# Patient Record
Sex: FEMALE | Race: BLACK OR AFRICAN AMERICAN | NOT HISPANIC OR LATINO | Employment: OTHER | URBAN - METROPOLITAN AREA
[De-identification: names, ages, dates, MRNs, and addresses within clinical notes are randomized per-mention and may not be internally consistent; named-entity substitution may affect disease eponyms.]

---

## 2017-04-07 ENCOUNTER — COMMUNICATION - HEALTHEAST (OUTPATIENT)
Dept: FAMILY MEDICINE | Facility: CLINIC | Age: 52
End: 2017-04-07

## 2017-05-18 ENCOUNTER — COMMUNICATION - HEALTHEAST (OUTPATIENT)
Dept: FAMILY MEDICINE | Facility: CLINIC | Age: 52
End: 2017-05-18

## 2017-05-18 DIAGNOSIS — I10 HTN (HYPERTENSION): ICD-10-CM

## 2017-06-13 ENCOUNTER — COMMUNICATION - HEALTHEAST (OUTPATIENT)
Dept: FAMILY MEDICINE | Facility: CLINIC | Age: 52
End: 2017-06-13

## 2017-06-14 ENCOUNTER — OFFICE VISIT - HEALTHEAST (OUTPATIENT)
Dept: FAMILY MEDICINE | Facility: CLINIC | Age: 52
End: 2017-06-14

## 2017-06-14 ENCOUNTER — COMMUNICATION - HEALTHEAST (OUTPATIENT)
Dept: TELEHEALTH | Facility: CLINIC | Age: 52
End: 2017-06-14

## 2017-06-14 DIAGNOSIS — E78.00 HYPERCHOLESTEREMIA: ICD-10-CM

## 2017-06-14 DIAGNOSIS — I10 ESSENTIAL HYPERTENSION: ICD-10-CM

## 2017-06-14 DIAGNOSIS — E89.40 SURGICAL MENOPAUSE: ICD-10-CM

## 2017-06-14 DIAGNOSIS — Z01.818 PREOP EXAMINATION: ICD-10-CM

## 2017-06-14 LAB
CHOLEST SERPL-MCNC: 222 MG/DL
FASTING STATUS PATIENT QL REPORTED: YES
HDLC SERPL-MCNC: 74 MG/DL

## 2017-06-14 ASSESSMENT — MIFFLIN-ST. JEOR: SCORE: 1464.47

## 2017-06-15 LAB
ATRIAL RATE - MUSE: 58 BPM
DIASTOLIC BLOOD PRESSURE - MUSE: NORMAL MMHG
INTERPRETATION ECG - MUSE: NORMAL
P AXIS - MUSE: 42 DEGREES
PR INTERVAL - MUSE: 172 MS
QRS DURATION - MUSE: 80 MS
QT - MUSE: 478 MS
QTC - MUSE: 469 MS
R AXIS - MUSE: 9 DEGREES
SYSTOLIC BLOOD PRESSURE - MUSE: NORMAL MMHG
T AXIS - MUSE: -6 DEGREES
VENTRICULAR RATE- MUSE: 58 BPM

## 2017-07-11 ENCOUNTER — RECORDS - HEALTHEAST (OUTPATIENT)
Dept: ADMINISTRATIVE | Facility: OTHER | Age: 52
End: 2017-07-11

## 2017-07-12 ENCOUNTER — COMMUNICATION - HEALTHEAST (OUTPATIENT)
Dept: FAMILY MEDICINE | Facility: CLINIC | Age: 52
End: 2017-07-12

## 2017-07-13 ENCOUNTER — RECORDS - HEALTHEAST (OUTPATIENT)
Dept: ADMINISTRATIVE | Facility: OTHER | Age: 52
End: 2017-07-13

## 2017-07-13 ENCOUNTER — COMMUNICATION - HEALTHEAST (OUTPATIENT)
Dept: FAMILY MEDICINE | Facility: CLINIC | Age: 52
End: 2017-07-13

## 2017-07-13 ENCOUNTER — COMMUNICATION - HEALTHEAST (OUTPATIENT)
Dept: SCHEDULING | Facility: CLINIC | Age: 52
End: 2017-07-13

## 2017-07-21 ENCOUNTER — COMMUNICATION - HEALTHEAST (OUTPATIENT)
Dept: FAMILY MEDICINE | Facility: CLINIC | Age: 52
End: 2017-07-21

## 2017-07-31 ENCOUNTER — RECORDS - HEALTHEAST (OUTPATIENT)
Dept: ADMINISTRATIVE | Facility: OTHER | Age: 52
End: 2017-07-31

## 2017-08-07 ENCOUNTER — COMMUNICATION - HEALTHEAST (OUTPATIENT)
Dept: FAMILY MEDICINE | Facility: CLINIC | Age: 52
End: 2017-08-07

## 2017-08-08 ENCOUNTER — COMMUNICATION - HEALTHEAST (OUTPATIENT)
Dept: FAMILY MEDICINE | Facility: CLINIC | Age: 52
End: 2017-08-08

## 2017-08-09 ENCOUNTER — RECORDS - HEALTHEAST (OUTPATIENT)
Dept: ADMINISTRATIVE | Facility: OTHER | Age: 52
End: 2017-08-09

## 2017-09-26 ENCOUNTER — COMMUNICATION - HEALTHEAST (OUTPATIENT)
Dept: SCHEDULING | Facility: CLINIC | Age: 52
End: 2017-09-26

## 2017-09-27 ENCOUNTER — COMMUNICATION - HEALTHEAST (OUTPATIENT)
Dept: TELEHEALTH | Facility: CLINIC | Age: 52
End: 2017-09-27

## 2017-09-27 ENCOUNTER — OFFICE VISIT - HEALTHEAST (OUTPATIENT)
Dept: FAMILY MEDICINE | Facility: CLINIC | Age: 52
End: 2017-09-27

## 2017-09-27 DIAGNOSIS — N64.4 BREAST PAIN: ICD-10-CM

## 2017-09-27 DIAGNOSIS — I10 HYPERTENSION: ICD-10-CM

## 2017-09-27 DIAGNOSIS — R60.0 LOWER EXTREMITY EDEMA: ICD-10-CM

## 2017-09-27 ASSESSMENT — MIFFLIN-ST. JEOR: SCORE: 1502.61

## 2017-10-02 ENCOUNTER — COMMUNICATION - HEALTHEAST (OUTPATIENT)
Dept: FAMILY MEDICINE | Facility: CLINIC | Age: 52
End: 2017-10-02

## 2017-12-14 ENCOUNTER — COMMUNICATION - HEALTHEAST (OUTPATIENT)
Dept: FAMILY MEDICINE | Facility: CLINIC | Age: 52
End: 2017-12-14

## 2017-12-14 ENCOUNTER — OFFICE VISIT - HEALTHEAST (OUTPATIENT)
Dept: FAMILY MEDICINE | Facility: CLINIC | Age: 52
End: 2017-12-14

## 2017-12-14 DIAGNOSIS — Z12.31 VISIT FOR SCREENING MAMMOGRAM: ICD-10-CM

## 2017-12-14 DIAGNOSIS — I10 HTN (HYPERTENSION): ICD-10-CM

## 2017-12-14 DIAGNOSIS — R60.9 EDEMA: ICD-10-CM

## 2017-12-14 DIAGNOSIS — Z00.00 HEALTHCARE MAINTENANCE: ICD-10-CM

## 2017-12-14 DIAGNOSIS — E89.40 SURGICAL MENOPAUSE: ICD-10-CM

## 2017-12-14 DIAGNOSIS — M17.0 OSTEOARTHRITIS OF KNEES, BILATERAL: ICD-10-CM

## 2017-12-14 ASSESSMENT — MIFFLIN-ST. JEOR: SCORE: 1520.76

## 2017-12-14 NOTE — ASSESSMENT & PLAN NOTE
Discussed standard of care being to stop estrogen replacement at approximately 49 years of age.  Understands risks of estrogen including stroke and venous thromboembolism.  At this point, we aregoing to stop hydrochlorothiazide, I am worried about potential side effects with this.  We will see her back and then discuss stopping the estrogen at a later date

## 2017-12-14 NOTE — ASSESSMENT & PLAN NOTE
Encourage walking, order written for physical therapy for from current outside of [facility] provider to include knees.

## 2017-12-14 NOTE — ASSESSMENT & PLAN NOTE
No signs of cardiac problems, denies snoring, recent workup negative for secondary causes.  Will check BMP and UA today.  Add compression stockings.  Also, because of chronic hypokalemia, will stop hydrochlorothiazide.  Follow-up 4-6 weeks.  This may make edema worse while the stockings hopefully help

## 2017-12-18 ENCOUNTER — COMMUNICATION - HEALTHEAST (OUTPATIENT)
Dept: FAMILY MEDICINE | Facility: CLINIC | Age: 52
End: 2017-12-18

## 2017-12-18 DIAGNOSIS — I10 HTN (HYPERTENSION): ICD-10-CM

## 2018-01-05 ENCOUNTER — RECORDS - HEALTHEAST (OUTPATIENT)
Dept: MAMMOGRAPHY | Facility: CLINIC | Age: 53
End: 2018-01-05

## 2018-01-05 DIAGNOSIS — Z12.31 ENCOUNTER FOR SCREENING MAMMOGRAM FOR MALIGNANT NEOPLASM OF BREAST: ICD-10-CM

## 2018-01-05 LAB — ALT SERPL W P-5'-P-CCNC: 11 U/L (ref 0–45)

## 2018-01-08 ENCOUNTER — COMMUNICATION - HEALTHEAST (OUTPATIENT)
Dept: FAMILY MEDICINE | Facility: CLINIC | Age: 53
End: 2018-01-08

## 2018-01-09 ENCOUNTER — COMMUNICATION - HEALTHEAST (OUTPATIENT)
Dept: FAMILY MEDICINE | Facility: CLINIC | Age: 53
End: 2018-01-09

## 2018-01-11 ENCOUNTER — RECORDS - HEALTHEAST (OUTPATIENT)
Dept: ADMINISTRATIVE | Facility: OTHER | Age: 53
End: 2018-01-11

## 2018-05-15 ENCOUNTER — RECORDS - HEALTHEAST (OUTPATIENT)
Dept: ADMINISTRATIVE | Facility: OTHER | Age: 53
End: 2018-05-15

## 2018-05-24 ENCOUNTER — OFFICE VISIT - HEALTHEAST (OUTPATIENT)
Dept: FAMILY MEDICINE | Facility: CLINIC | Age: 53
End: 2018-05-24

## 2018-05-24 ENCOUNTER — AMBULATORY - HEALTHEAST (OUTPATIENT)
Dept: FAMILY MEDICINE | Facility: CLINIC | Age: 53
End: 2018-05-24

## 2018-05-24 DIAGNOSIS — Z01.818 PRE-OP EXAMINATION: ICD-10-CM

## 2018-05-24 LAB
ATRIAL RATE - MUSE: 57 BPM
DIASTOLIC BLOOD PRESSURE - MUSE: NORMAL MMHG
HGB BLD-MCNC: 11.5 G/DL (ref 12–16)
INTERPRETATION ECG - MUSE: NORMAL
P AXIS - MUSE: 150 DEGREES
POTASSIUM BLD-SCNC: 3.5 MMOL/L (ref 3.5–5)
PR INTERVAL - MUSE: 176 MS
QRS DURATION - MUSE: 80 MS
QT - MUSE: 482 MS
QTC - MUSE: 469 MS
R AXIS - MUSE: -25 DEGREES
SYSTOLIC BLOOD PRESSURE - MUSE: NORMAL MMHG
T AXIS - MUSE: -39 DEGREES
VENTRICULAR RATE- MUSE: 57 BPM

## 2018-05-24 ASSESSMENT — MIFFLIN-ST. JEOR: SCORE: 1535.5

## 2018-05-25 ENCOUNTER — RECORDS - HEALTHEAST (OUTPATIENT)
Dept: ADMINISTRATIVE | Facility: OTHER | Age: 53
End: 2018-05-25

## 2018-05-30 ENCOUNTER — RECORDS - HEALTHEAST (OUTPATIENT)
Dept: ADMINISTRATIVE | Facility: OTHER | Age: 53
End: 2018-05-30

## 2018-05-31 ENCOUNTER — OFFICE VISIT - HEALTHEAST (OUTPATIENT)
Dept: GERIATRICS | Facility: CLINIC | Age: 53
End: 2018-05-31

## 2018-05-31 DIAGNOSIS — R53.81 PHYSICAL DECONDITIONING: ICD-10-CM

## 2018-05-31 DIAGNOSIS — M48.02 SPINAL STENOSIS IN CERVICAL REGION: ICD-10-CM

## 2018-06-01 ENCOUNTER — AMBULATORY - HEALTHEAST (OUTPATIENT)
Dept: ADMINISTRATIVE | Facility: CLINIC | Age: 53
End: 2018-06-01

## 2018-06-01 ENCOUNTER — OFFICE VISIT - HEALTHEAST (OUTPATIENT)
Dept: GERIATRICS | Facility: CLINIC | Age: 53
End: 2018-06-01

## 2018-06-01 DIAGNOSIS — Z98.1 S/P CERVICAL SPINAL FUSION: ICD-10-CM

## 2018-06-01 DIAGNOSIS — G89.29 OTHER CHRONIC PAIN: ICD-10-CM

## 2018-06-01 DIAGNOSIS — R51.9 CHRONIC NONINTRACTABLE HEADACHE, UNSPECIFIED HEADACHE TYPE: ICD-10-CM

## 2018-06-01 DIAGNOSIS — G89.29 CHRONIC NONINTRACTABLE HEADACHE, UNSPECIFIED HEADACHE TYPE: ICD-10-CM

## 2018-06-01 DIAGNOSIS — M48.02 SPINAL STENOSIS IN CERVICAL REGION: ICD-10-CM

## 2018-06-01 DIAGNOSIS — I10 ESSENTIAL HYPERTENSION: ICD-10-CM

## 2018-06-05 ENCOUNTER — OFFICE VISIT - HEALTHEAST (OUTPATIENT)
Dept: GERIATRICS | Facility: CLINIC | Age: 53
End: 2018-06-05

## 2018-06-05 DIAGNOSIS — F32.9 MAJOR DEPRESSIVE DISORDER: ICD-10-CM

## 2018-06-08 ENCOUNTER — RECORDS - HEALTHEAST (OUTPATIENT)
Dept: LAB | Facility: CLINIC | Age: 53
End: 2018-06-08

## 2018-06-08 ENCOUNTER — OFFICE VISIT - HEALTHEAST (OUTPATIENT)
Dept: GERIATRICS | Facility: CLINIC | Age: 53
End: 2018-06-08

## 2018-06-08 DIAGNOSIS — G89.29 OTHER CHRONIC PAIN: ICD-10-CM

## 2018-06-08 DIAGNOSIS — M15.9 GENERALIZED OSTEOARTHROSIS, INVOLVING MULTIPLE SITES: ICD-10-CM

## 2018-06-08 DIAGNOSIS — Z98.1 S/P CERVICAL SPINAL FUSION: ICD-10-CM

## 2018-06-08 DIAGNOSIS — M48.02 SPINAL STENOSIS IN CERVICAL REGION: ICD-10-CM

## 2018-06-08 LAB
ALBUMIN UR-MCNC: NEGATIVE MG/DL
APPEARANCE UR: ABNORMAL
BACTERIA #/AREA URNS HPF: ABNORMAL HPF
BILIRUB UR QL STRIP: NEGATIVE
COLOR UR AUTO: YELLOW
GLUCOSE UR STRIP-MCNC: NEGATIVE MG/DL
HGB UR QL STRIP: ABNORMAL
KETONES UR STRIP-MCNC: NEGATIVE MG/DL
LEUKOCYTE ESTERASE UR QL STRIP: ABNORMAL
MUCOUS THREADS #/AREA URNS LPF: ABNORMAL LPF
NITRATE UR QL: NEGATIVE
PH UR STRIP: 5.5 [PH] (ref 4.5–8)
RBC #/AREA URNS AUTO: ABNORMAL HPF
SP GR UR STRIP: 1.01 (ref 1–1.03)
SQUAMOUS #/AREA URNS AUTO: ABNORMAL LPF
TRANS CELLS #/AREA URNS HPF: ABNORMAL LPF
UROBILINOGEN UR STRIP-ACNC: ABNORMAL
WBC #/AREA URNS AUTO: ABNORMAL HPF
WBC CLUMPS #/AREA URNS HPF: PRESENT /[HPF]

## 2018-06-09 LAB — BACTERIA SPEC CULT: NO GROWTH

## 2018-06-12 ENCOUNTER — HOME CARE/HOSPICE - HEALTHEAST (OUTPATIENT)
Dept: HOME HEALTH SERVICES | Facility: HOME HEALTH | Age: 53
End: 2018-06-12

## 2018-06-12 ENCOUNTER — OFFICE VISIT - HEALTHEAST (OUTPATIENT)
Dept: GERIATRICS | Facility: CLINIC | Age: 53
End: 2018-06-12

## 2018-06-12 DIAGNOSIS — R53.81 PHYSICAL DECONDITIONING: ICD-10-CM

## 2018-06-12 DIAGNOSIS — M54.12 BRACHIAL NEURITIS OR RADICULITIS: ICD-10-CM

## 2018-06-12 DIAGNOSIS — M48.02 SPINAL STENOSIS IN CERVICAL REGION: ICD-10-CM

## 2018-06-14 ENCOUNTER — COMMUNICATION - HEALTHEAST (OUTPATIENT)
Dept: GERIATRICS | Facility: CLINIC | Age: 53
End: 2018-06-14

## 2018-06-14 DIAGNOSIS — M48.02 CERVICAL STENOSIS OF SPINE: ICD-10-CM

## 2018-06-15 ENCOUNTER — COMMUNICATION - HEALTHEAST (OUTPATIENT)
Dept: GERIATRICS | Facility: CLINIC | Age: 53
End: 2018-06-15

## 2018-06-15 ENCOUNTER — AMBULATORY - HEALTHEAST (OUTPATIENT)
Dept: GERIATRICS | Facility: CLINIC | Age: 53
End: 2018-06-15

## 2018-06-18 ENCOUNTER — COMMUNICATION - HEALTHEAST (OUTPATIENT)
Dept: HOME HEALTH SERVICES | Facility: HOME HEALTH | Age: 53
End: 2018-06-18

## 2018-09-11 ENCOUNTER — RECORDS - HEALTHEAST (OUTPATIENT)
Dept: ADMINISTRATIVE | Facility: OTHER | Age: 53
End: 2018-09-11

## 2018-09-12 ENCOUNTER — RECORDS - HEALTHEAST (OUTPATIENT)
Dept: ADMINISTRATIVE | Facility: OTHER | Age: 53
End: 2018-09-12

## 2019-01-22 ENCOUNTER — RECORDS - HEALTHEAST (OUTPATIENT)
Dept: ADMINISTRATIVE | Facility: OTHER | Age: 54
End: 2019-01-22

## 2019-01-24 ENCOUNTER — COMMUNICATION - HEALTHEAST (OUTPATIENT)
Dept: FAMILY MEDICINE | Facility: CLINIC | Age: 54
End: 2019-01-24

## 2019-11-01 ENCOUNTER — COMMUNICATION - HEALTHEAST (OUTPATIENT)
Dept: FAMILY MEDICINE | Facility: CLINIC | Age: 54
End: 2019-11-01

## 2019-11-01 ENCOUNTER — AMBULATORY - HEALTHEAST (OUTPATIENT)
Dept: FAMILY MEDICINE | Facility: CLINIC | Age: 54
End: 2019-11-01

## 2019-11-04 ENCOUNTER — COMMUNICATION - HEALTHEAST (OUTPATIENT)
Dept: FAMILY MEDICINE | Facility: CLINIC | Age: 54
End: 2019-11-04

## 2019-12-21 ENCOUNTER — OFFICE VISIT (OUTPATIENT)
Dept: URGENT CARE | Facility: URGENT CARE | Age: 54
End: 2019-12-21
Payer: COMMERCIAL

## 2019-12-21 ENCOUNTER — ANCILLARY PROCEDURE (OUTPATIENT)
Dept: GENERAL RADIOLOGY | Facility: CLINIC | Age: 54
End: 2019-12-21
Attending: FAMILY MEDICINE
Payer: COMMERCIAL

## 2019-12-21 VITALS
DIASTOLIC BLOOD PRESSURE: 78 MMHG | OXYGEN SATURATION: 98 % | HEART RATE: 72 BPM | RESPIRATION RATE: 18 BRPM | TEMPERATURE: 98.1 F | SYSTOLIC BLOOD PRESSURE: 132 MMHG | WEIGHT: 236.4 LBS

## 2019-12-21 DIAGNOSIS — S49.91XA SHOULDER INJURY, RIGHT, INITIAL ENCOUNTER: Primary | ICD-10-CM

## 2019-12-21 PROCEDURE — 99203 OFFICE O/P NEW LOW 30 MIN: CPT | Performed by: FAMILY MEDICINE

## 2019-12-21 PROCEDURE — 73030 X-RAY EXAM OF SHOULDER: CPT | Mod: RT

## 2019-12-21 RX ORDER — GABAPENTIN 300 MG/1
CAPSULE ORAL
COMMUNITY
Start: 2019-04-14 | End: 2021-09-03

## 2019-12-21 RX ORDER — METHOCARBAMOL 750 MG/1
750 TABLET, FILM COATED ORAL
COMMUNITY
Start: 2018-05-30 | End: 2021-09-03

## 2019-12-21 RX ORDER — DULOXETIN HYDROCHLORIDE 60 MG/1
CAPSULE, DELAYED RELEASE ORAL
COMMUNITY
Start: 2019-10-10 | End: 2021-09-03

## 2019-12-21 RX ORDER — OXYCODONE HYDROCHLORIDE 10 MG/1
10 TABLET ORAL EVERY 4 HOURS PRN
COMMUNITY
Start: 2019-12-02 | End: 2024-01-31

## 2019-12-21 RX ORDER — LISINOPRIL 20 MG/1
TABLET ORAL
COMMUNITY
Start: 2019-07-06 | End: 2021-09-03

## 2019-12-21 RX ORDER — HYDROXYZINE PAMOATE 50 MG/1
CAPSULE ORAL
COMMUNITY
Start: 2019-03-30 | End: 2021-09-03

## 2019-12-21 RX ORDER — NAPROXEN 500 MG/1
500 TABLET ORAL 2 TIMES DAILY WITH MEALS
Qty: 14 TABLET | Refills: 0 | Status: SHIPPED | OUTPATIENT
Start: 2019-12-21 | End: 2019-12-28

## 2019-12-21 RX ORDER — NORGESTIMATE AND ETHINYL ESTRADIOL 7DAYSX3 28
1 KIT ORAL
COMMUNITY
End: 2021-09-03

## 2019-12-21 RX ORDER — PREGABALIN 200 MG/1
CAPSULE ORAL
COMMUNITY
Start: 2019-10-08

## 2019-12-21 RX ORDER — ASPIRIN 325 MG
325 TABLET ORAL
COMMUNITY
End: 2023-09-19

## 2019-12-21 RX ORDER — HYDROCHLOROTHIAZIDE 25 MG/1
TABLET ORAL
COMMUNITY
Start: 2019-11-28 | End: 2021-09-03

## 2019-12-21 RX ORDER — AMLODIPINE BESYLATE 10 MG/1
TABLET ORAL
COMMUNITY
Start: 2019-11-28 | End: 2021-09-03

## 2019-12-21 NOTE — PROGRESS NOTES
SUBJECTIVE:  Chief Complaint   Patient presents with     Arm Injury     Right arm s0onodst when fell on ice about 9 days ago and is still causeing a lot of pain   .ident presents with a chief complaint of right shoulder.  The injury occurred 9 days ago.   The injury happened while while walking.   How: trauma: fall immediate pain  The patient complained of moderate pain and has had decreased ROM.    Pain exacerbated by movement    He treated it initially with no therapy.   This is the first time this type of injury has occurred to this patient.     No past medical history on file.  Allergies   Allergen Reactions     Hydrocodone-Acetaminophen GI Disturbance and Nausea     Ibuprofen Nausea     Social History     Tobacco Use     Smoking status: Never Smoker     Smokeless tobacco: Never Used   Substance Use Topics     Alcohol use: Not on file       ROSINTEGUMENTARY/SKIN: NEGATIVE for open wound/bleeding and NEGATIVE for bruising    EXAM: /78   Pulse 72   Temp 98.1  F (36.7  C) (Tympanic)   Resp 18   Wt 107.2 kg (236 lb 6.4 oz)   SpO2 98% Gen: healthy,alert,no distress  Extremity: shoulder has pain with palpation and rom.   There is not compromise to the distal circulation.  Pulses are +2 and CRT is brisk.GENERAL APPEARANCE: healthy, alert and no distress  EXTREMITIES: peripheral pulses normal  SKIN: no suspicious lesions or rashes  NEURO: Normal strength and tone, sensory exam grossly normal, mentation intact and speech normal    Xray without acute findings, no fx read by Onofre Meek D.O.      ICD-10-CM    1. Shoulder injury, right, initial encounter S49.91XA XR Shoulder Right G/E 3 Views     naproxen (NAPROSYN) 500 MG tablet     ORTHOPEDICS ADULT REFERRAL     [facility]

## 2020-01-30 ENCOUNTER — OFFICE VISIT - HEALTHEAST (OUTPATIENT)
Dept: FAMILY MEDICINE | Facility: CLINIC | Age: 55
End: 2020-01-30

## 2020-01-30 DIAGNOSIS — M25.561 CHRONIC PATELLOFEMORAL PAIN OF BOTH KNEES: ICD-10-CM

## 2020-01-30 DIAGNOSIS — G89.29 CHRONIC PATELLOFEMORAL PAIN OF BOTH KNEES: ICD-10-CM

## 2020-01-30 DIAGNOSIS — F33.9 EPISODE OF RECURRENT MAJOR DEPRESSIVE DISORDER, UNSPECIFIED DEPRESSION EPISODE SEVERITY (H): ICD-10-CM

## 2020-01-30 DIAGNOSIS — M25.562 CHRONIC PATELLOFEMORAL PAIN OF BOTH KNEES: ICD-10-CM

## 2020-01-30 DIAGNOSIS — Z00.00 HEALTHCARE MAINTENANCE: ICD-10-CM

## 2020-01-30 DIAGNOSIS — Z12.11 COLON CANCER SCREENING: ICD-10-CM

## 2020-01-30 ASSESSMENT — MIFFLIN-ST. JEOR: SCORE: 1586.52

## 2020-01-30 NOTE — ASSESSMENT & PLAN NOTE
Longstanding history of depression, doing somewhat worse lately, details of this not discussed due to time constraint.  Restart Cymbalta which she has been off of for some time, low-dose, 30 mg.    I know that she has had some hallucinations in the past.  Discussed amber, no history of this.    Denies suicidal ideation.  Follow-up 1 month for further discussion/physical.

## 2020-01-30 NOTE — ASSESSMENT & PLAN NOTE
"Severe, right leg.  Already having physical therapy after fall on her right shoulder.  Will ask them to assess and treat this, through [facility]    Discussed pathophysiology, discussed contribution of weight, recommend analgesics which she is already taking.  Patient is followed by pain clinic, is on oxycodone and OxyContin.    Also, I think that it is likely that she has a Baker's cyst in her right knee.    "

## 2020-01-30 NOTE — ASSESSMENT & PLAN NOTE
Discussed colonoscopy, patient agreeable.  Follow-up in the near future for physical exam.  Did have a hysterectomy.

## 2020-12-01 ENCOUNTER — OFFICE VISIT - HEALTHEAST (OUTPATIENT)
Dept: FAMILY MEDICINE | Facility: CLINIC | Age: 55
End: 2020-12-01

## 2020-12-01 ENCOUNTER — COMMUNICATION - HEALTHEAST (OUTPATIENT)
Dept: FAMILY MEDICINE | Facility: CLINIC | Age: 55
End: 2020-12-01

## 2020-12-01 DIAGNOSIS — E66.01 MORBID OBESITY (H): ICD-10-CM

## 2020-12-01 DIAGNOSIS — I10 ESSENTIAL HYPERTENSION: ICD-10-CM

## 2020-12-01 DIAGNOSIS — E78.00 HYPERCHOLESTEREMIA: ICD-10-CM

## 2020-12-01 DIAGNOSIS — E66.813 CLASS 3 SEVERE OBESITY WITH SERIOUS COMORBIDITY AND BODY MASS INDEX (BMI) OF 40.0 TO 44.9 IN ADULT, UNSPECIFIED OBESITY TYPE (H): ICD-10-CM

## 2020-12-01 DIAGNOSIS — E66.01 CLASS 3 SEVERE OBESITY WITH SERIOUS COMORBIDITY AND BODY MASS INDEX (BMI) OF 40.0 TO 44.9 IN ADULT, UNSPECIFIED OBESITY TYPE (H): ICD-10-CM

## 2020-12-01 DIAGNOSIS — M25.562 ARTHRALGIA OF LEFT LOWER LEG: ICD-10-CM

## 2020-12-01 DIAGNOSIS — Z00.00 HEALTHCARE MAINTENANCE: ICD-10-CM

## 2020-12-01 DIAGNOSIS — D64.9 ANEMIA, UNSPECIFIED TYPE: ICD-10-CM

## 2020-12-01 DIAGNOSIS — F33.9 EPISODE OF RECURRENT MAJOR DEPRESSIVE DISORDER, UNSPECIFIED DEPRESSION EPISODE SEVERITY (H): ICD-10-CM

## 2020-12-01 DIAGNOSIS — E87.6 HYPOPOTASSEMIA: ICD-10-CM

## 2020-12-01 LAB
ALBUMIN SERPL-MCNC: 4.2 G/DL (ref 3.5–5)
ALP SERPL-CCNC: 91 U/L (ref 45–120)
ALT SERPL W P-5'-P-CCNC: 14 U/L (ref 0–45)
ANION GAP SERPL CALCULATED.3IONS-SCNC: 11 MMOL/L (ref 5–18)
AST SERPL W P-5'-P-CCNC: 20 U/L (ref 0–40)
BILIRUB SERPL-MCNC: 0.2 MG/DL (ref 0–1)
BUN SERPL-MCNC: 14 MG/DL (ref 8–22)
CALCIUM SERPL-MCNC: 9.1 MG/DL (ref 8.5–10.5)
CHLORIDE BLD-SCNC: 102 MMOL/L (ref 98–107)
CHOLEST SERPL-MCNC: 200 MG/DL
CO2 SERPL-SCNC: 28 MMOL/L (ref 22–31)
CREAT SERPL-MCNC: 0.97 MG/DL (ref 0.6–1.1)
FASTING STATUS PATIENT QL REPORTED: NO
GFR SERPL CREATININE-BSD FRML MDRD: 59 ML/MIN/1.73M2
GLUCOSE BLD-MCNC: 79 MG/DL (ref 70–125)
HDLC SERPL-MCNC: 63 MG/DL
HGB BLD-MCNC: 10.4 G/DL (ref 12–16)
POTASSIUM BLD-SCNC: 3.8 MMOL/L (ref 3.5–5)
PROT SERPL-MCNC: 7.5 G/DL (ref 6–8)
SODIUM SERPL-SCNC: 141 MMOL/L (ref 136–145)

## 2020-12-01 ASSESSMENT — PATIENT HEALTH QUESTIONNAIRE - PHQ9: SUM OF ALL RESPONSES TO PHQ QUESTIONS 1-9: 6

## 2020-12-01 ASSESSMENT — MIFFLIN-ST. JEOR: SCORE: 1581.99

## 2020-12-01 NOTE — ASSESSMENT & PLAN NOTE
Frustrated, no weight loss on an intermittent fasting diet and exercising regularly.  Discussed referral to bariatric clinic.  Agreeable.

## 2020-12-01 NOTE — ASSESSMENT & PLAN NOTE
Not focus of discussion today, reports doing fairly wellconsidering Covid.  PHQ 9= 6    5 to 9: mild   10 to 14: moderate   15 to 19: moderately severe   ?20: severe     Taking duloxetine 30  No changes

## 2020-12-01 NOTE — ASSESSMENT & PLAN NOTE
Pressure mysteriously excellent today.  Not on any medication for some time since she ran out.  Will hold off for now.

## 2020-12-01 NOTE — ASSESSMENT & PLAN NOTE
With osteoarthritis of the left knee.  Refer for physical therapy, refer for bariatric clinic, Celebrex was effective for her and nowthat blood pressure is not an interfering issue and normal renal function, will prescribe that for pain.  Patient also on opiate contract through pain clinic.

## 2020-12-02 LAB
FERRITIN SERPL-MCNC: 119 NG/ML (ref 10–130)
HCV AB SERPL QL IA: NEGATIVE

## 2021-01-15 ENCOUNTER — COMMUNICATION - HEALTHEAST (OUTPATIENT)
Dept: SURGERY | Facility: CLINIC | Age: 56
End: 2021-01-15

## 2021-01-18 ENCOUNTER — COMMUNICATION - HEALTHEAST (OUTPATIENT)
Dept: FAMILY MEDICINE | Facility: CLINIC | Age: 56
End: 2021-01-18

## 2021-01-18 DIAGNOSIS — I10 ESSENTIAL HYPERTENSION: ICD-10-CM

## 2021-02-28 ENCOUNTER — COMMUNICATION - HEALTHEAST (OUTPATIENT)
Dept: FAMILY MEDICINE | Facility: CLINIC | Age: 56
End: 2021-02-28

## 2021-02-28 ENCOUNTER — AMBULATORY - HEALTHEAST (OUTPATIENT)
Dept: FAMILY MEDICINE | Facility: CLINIC | Age: 56
End: 2021-02-28

## 2021-02-28 DIAGNOSIS — M25.562 ARTHRALGIA OF LEFT LOWER LEG: ICD-10-CM

## 2021-03-16 ENCOUNTER — COMMUNICATION - HEALTHEAST (OUTPATIENT)
Dept: FAMILY MEDICINE | Facility: CLINIC | Age: 56
End: 2021-03-16

## 2021-03-16 DIAGNOSIS — I10 ESSENTIAL HYPERTENSION: ICD-10-CM

## 2021-03-22 ENCOUNTER — RECORDS - HEALTHEAST (OUTPATIENT)
Dept: ADMINISTRATIVE | Facility: OTHER | Age: 56
End: 2021-03-22

## 2021-03-22 ENCOUNTER — COMMUNICATION - HEALTHEAST (OUTPATIENT)
Dept: FAMILY MEDICINE | Facility: CLINIC | Age: 56
End: 2021-03-22

## 2021-03-25 ENCOUNTER — COMMUNICATION - HEALTHEAST (OUTPATIENT)
Dept: SCHEDULING | Facility: CLINIC | Age: 56
End: 2021-03-25

## 2021-04-15 ENCOUNTER — COMMUNICATION - HEALTHEAST (OUTPATIENT)
Dept: FAMILY MEDICINE | Facility: CLINIC | Age: 56
End: 2021-04-15

## 2021-04-15 DIAGNOSIS — M25.562 ARTHRALGIA OF LEFT LOWER LEG: ICD-10-CM

## 2021-05-26 ENCOUNTER — RECORDS - HEALTHEAST (OUTPATIENT)
Dept: ADMINISTRATIVE | Facility: CLINIC | Age: 56
End: 2021-05-26

## 2021-05-26 ASSESSMENT — PATIENT HEALTH QUESTIONNAIRE - PHQ9: SUM OF ALL RESPONSES TO PHQ QUESTIONS 1-9: 6

## 2021-05-27 ENCOUNTER — RECORDS - HEALTHEAST (OUTPATIENT)
Dept: ADMINISTRATIVE | Facility: CLINIC | Age: 56
End: 2021-05-27

## 2021-05-28 ENCOUNTER — RECORDS - HEALTHEAST (OUTPATIENT)
Dept: ADMINISTRATIVE | Facility: CLINIC | Age: 56
End: 2021-05-28

## 2021-05-29 ENCOUNTER — RECORDS - HEALTHEAST (OUTPATIENT)
Dept: ADMINISTRATIVE | Facility: CLINIC | Age: 56
End: 2021-05-29

## 2021-05-31 VITALS — HEIGHT: 63 IN | BODY MASS INDEX: 36.86 KG/M2 | WEIGHT: 208 LBS

## 2021-05-31 VITALS — BODY MASS INDEX: 37.56 KG/M2 | HEIGHT: 63 IN | WEIGHT: 212 LBS

## 2021-05-31 VITALS — HEIGHT: 63 IN | BODY MASS INDEX: 35.61 KG/M2 | WEIGHT: 201 LBS

## 2021-06-01 VITALS — WEIGHT: 217 LBS | HEIGHT: 63 IN | BODY MASS INDEX: 38.45 KG/M2

## 2021-06-01 VITALS — WEIGHT: 206.8 LBS | BODY MASS INDEX: 37.22 KG/M2

## 2021-06-01 VITALS — BODY MASS INDEX: 38.59 KG/M2 | WEIGHT: 214.4 LBS

## 2021-06-01 VITALS — WEIGHT: 214.4 LBS | BODY MASS INDEX: 38.59 KG/M2

## 2021-06-01 VITALS — WEIGHT: 207.4 LBS | BODY MASS INDEX: 37.33 KG/M2

## 2021-06-02 NOTE — TELEPHONE ENCOUNTER
FYI - Status Update  Who is Calling: Patient  Update: Patient stated that she would like this to be faxed to the fax number [phone]. Patient is questioning if this can be done today as she will be moving in to her apartment today.  Okay to leave a detailed message?:  No

## 2021-06-02 NOTE — TELEPHONE ENCOUNTER
Who is requesting the letter?  Patient  Why is the letter needed? Needs letter for emotional support animal (has a dog) to be faxed to her new apartment building she is moving to today.    How would you like this letter returned? Patient states she will call back with the fax number    Okay to leave a detailed message? Yes

## 2021-06-03 NOTE — TELEPHONE ENCOUNTER
Name of form/paperwork: Forms from [facility] for patients emotional support animal.  Have you been seen for this request: yes  Do we have the form: Will be faxed to clinic today.  When is form needed by: asap  How would you like the form returned: faxed  Fax Number: [phone]  Patient Notified form requests are processed in 3-5 business days: No  (If patient needs form sooner, please note that in this message.)  Okay to leave a detailed message? Yes, call patient when forms are received.

## 2021-06-03 NOTE — TELEPHONE ENCOUNTER
Patient Returning Call  Reason for call:  Patient called back.  Information relayed to patient: Informed that the letter was faxed. Patient states they never got the letter.  Patient is requesting the letter to be re faxed to the [facility] kostas Spear at [phone], which is the same number.  Patient requesting a call back when letter is faxed again.  Patient has additional questions:  No  If YES, what are your questions/concerns:  As above.  Okay to leave a detailed message?: Yes

## 2021-06-04 ENCOUNTER — RECORDS - HEALTHEAST (OUTPATIENT)
Dept: ADMINISTRATIVE | Facility: CLINIC | Age: 56
End: 2021-06-04

## 2021-06-04 VITALS
RESPIRATION RATE: 18 BRPM | DIASTOLIC BLOOD PRESSURE: 78 MMHG | HEIGHT: 62 IN | BODY MASS INDEX: 42.33 KG/M2 | WEIGHT: 230 LBS | SYSTOLIC BLOOD PRESSURE: 104 MMHG | HEART RATE: 72 BPM

## 2021-06-05 VITALS
TEMPERATURE: 97.6 F | DIASTOLIC BLOOD PRESSURE: 66 MMHG | RESPIRATION RATE: 20 BRPM | BODY MASS INDEX: 42.14 KG/M2 | HEART RATE: 64 BPM | HEIGHT: 62 IN | WEIGHT: 229 LBS | SYSTOLIC BLOOD PRESSURE: 100 MMHG

## 2021-06-05 NOTE — PROGRESS NOTES
"Assessment/ Plan  Problem List Items Addressed This Visit        High    Major Depression, Recurrent - Primary     Longstanding history of depression, doing somewhat worse lately, details of this not discussed due to time constraint.  Restart Cymbalta which she has been off of for some time, low-dose, 30 mg.    I know that she has had some hallucinations in the past.  Discussed amber, no history of this.    Denies suicidal ideation.  Follow-up 1 month for further discussion/physical.           Relevant Medications    DULoxetine (CYMBALTA) 30 MG capsule       Unprioritized    Healthcare maintenance     Discussed colonoscopy, patient agreeable.  Follow-up in the near future for physical exam.  Did have a hysterectomy.         Chronic patellofemoral pain of both knees     Severe, right leg.  Already having physical therapy after fall on her right shoulder.  Will ask them to assess and treat this, through [facility]    Discussed pathophysiology, discussed contribution of weight, recommend analgesics which she is already taking.  Patient is followed by pain clinic, is on oxycodone and OxyContin.    Also, I think that it is likely that she has a Baker's cyst in her right knee.             Other Visit Diagnoses     Colon cancer screening        Relevant Orders    Ambulatory referral for Colonoscopy        Subjective  CC:  Chief Complaint   Patient presents with     Knee Pain     Arm Pain     HPI:  Gradual onset of bilateral knee pain, worse in the right than it is the left.  Patient without recent injury.  Not sure if the knee is swollen.  Pain anterior, severe, particularly when she is trying to get up from sitting or laying down, hurts to go downstairs.  PFSH:  Patient Active Problem List   Diagnosis     Hypokalemia     Hematuria     Urge Incontinence Of Urine     Chronic Constipation     Cervical Spine Stenosis     Generalized anxiety disorder     Patellofemoral Syndrome Of The Left Knee     " Hypercholesteremia     Major Depression, Recurrent     Hypertension     Generalized Osteoarthritis     Cervical Disc Degeneration     Lumbar Radiculopathy     Bladder wall thickening     Hydronephrosis, right     Constipation     Hypomagnesemia     Brachial neuritis or radiculitis NOS     Health care maintenance     Chronic pain     Osteoarthritis of knees, bilateral     Edema     Surgical menopause     Healthcare maintenance     Physical deconditioning     Chronic patellofemoral pain of both knees     Current medications reviewed as follows:  Current Outpatient Medications on File Prior to Visit   Medication Sig     amLODIPine (NORVASC) 10 MG tablet Take 10 mg by mouth daily.     aspirin 325 MG tablet Take 325 mg by mouth daily.     aspirin-acetaminophen-caffeine (EXCEDRIN MIGRAINE) 250-250-65 mg per tablet Take 2 tablets by mouth 2 (two) times a day as needed for pain.     gabapentin (NEURONTIN) 300 MG capsule Take 600 mg by mouth 2 (two) times a day.     hydroCHLOROthiazide (HYDRODIURIL) 25 MG tablet Take 25 mg by mouth daily.     lisinopril (PRINIVIL,ZESTRIL) 2.5 MG tablet Take 2.5 mg by mouth daily.     oxyCODONE (ROXICODONE) 10 mg immediate release tablet Take 1-2 tablets (10-20 mg total) by mouth every 4 (four) hours as needed for pain (10mg for pain 4-6, 20mg for pain 7-10).     acetaminophen (TYLENOL) 325 MG tablet Take 325-650 mg by mouth every 6 (six) hours as needed for pain.     gabapentin (NEURONTIN) 300 MG capsule Take 900 mg by mouth at bedtime.     methocarbamol (ROBAXIN) 750 MG tablet Take 750 mg by mouth 3 (three) times a day.     nitroglycerin (NITROSTAT) 0.4 MG SL tablet Place 0.4 mg under the tongue every 5 (five) minutes as needed for chest pain.     polyethylene glycol (MIRALAX) 17 gram packet Take 17 g by mouth daily.     senna-docusate (SENNOSIDES-DOCUSATE SODIUM) 8.6-50 mg tablet Take 1-4 tablets by mouth 2 (two) times a day.     No current facility-administered medications on file prior  "to visit.      Social History     Tobacco Use   Smoking Status Current Some Day Smoker   Smokeless Tobacco Never Used     Social History     Social History Narrative     Not on file     Patient Care Team:  Jaime Keane MD as PCP - General  Jaime Keane MD as Assigned PCP  [facility]  Full 10 system review including constitutional, respiratory, cardiac, gi, urinary, rheumatologic, neurologic, reproductive, dermatologic psychiatric is  performed  and is otherwise negative         Objective  Physical Exam  Vitals:    01/30/20 1105   BP: 104/78   Patient Site: Right Arm   Patient Position: Sitting   Cuff Size: Adult Large   Pulse: 72   Resp: 18   Weight: (!) 230 lb (104.3 kg)   Height: 5' 2\" (1.575 m)     Body mass index is 42.07 kg/m .  Right knee is normal on inspection.  There is no sign of effusion and overlying skin is not erythematous.  Palpation of the knee shows  Moderate tenderness of the patella and positive patellar compression test.  Negative tenderness of the medial joint line.  Negative tenderness of the lateral joint line.  There is no tenderness with stress of the MCL or LCL.  Lockman's test is negative.  Dominga's test is negative        Diagnostics:   none      Please note: Voice recognition software was used in this dictation.  It may therefore contain typographical errors.      "

## 2021-06-11 NOTE — PROGRESS NOTES
[facility] PRE-OPERATIVE VISIT FOR:    Sridevi Centeno,  1965, MRN [id number]    Upcoming surgery date: 17  Surgeon: Meir  Surgery Facility: [facility]    Chief Complaint: Preop    PCP: Jaime Keane MD, [phone]    SUBJECTIVE:  History of Present Illness:   Sridevi Centeno is a 51 y.o. female with history of chronic low back pain.  On long-term opiate medications, still with severe pain.  Plan for spinal cord stimulator as adjunct treatment recommended by pain physician  Past Medical History:  Patient Active Problem List   Diagnosis     Hypokalemia     Hematuria     Urge Incontinence Of Urine     Chronic Constipation     Cervical Spine Stenosis     Generalized anxiety disorder     Patellofemoral Syndrome Of The Left Knee     Hypercholesteremia     Major Depression, Recurrent     Hypertension     Generalized Osteoarthritis     Cervical Disc Degeneration     Lumbar Radiculopathy     Bladder wall thickening     Hydronephrosis, right     Constipation     Hypomagnesemia     Brachial neuritis or radiculitis NOS     Health care maintenance     Chronic pain     Past Surgical History:   Procedure Laterality Date     HYSTERECTOMY       OOPHORECTOMY       NE APPENDECTOMY      Description: Appendectomy;  Recorded: 2011;     NE ARTHRODESIS ANT INTERBODY MIN DISCECTOMY, CERVICAL BELOW C2      Description: Cervical Vertebral Fusion;  Recorded: 2011;     NE TOTAL ABDOM HYSTERECTOMY      Description: Total Abdominal Hysterectomy;  Proc Date: 1993;     History of bleeding: No    History of tobacco use: No    History of anesthesia reactions: No    Social History:  she  reports that she has never smoked. She has never used smokeless tobacco. She reports that she drinks alcohol. She reports that she does not use illicit drugs.  Single  Family History:  Family History   Problem Relation Age of Onset     No Medical Problems Mother      No Medical Problems Father      No  Medical Problems Sister      No Medical Problems Daughter      No Medical Problems Maternal Grandmother      No Medical Problems Maternal Grandfather      No Medical Problems Paternal Grandmother      No Medical Problems Paternal Grandfather      No Medical Problems Maternal Aunt      No Medical Problems Paternal Aunt      BRCA 1/2 Neg Hx      Breast cancer Neg Hx      Cancer Neg Hx      Colon cancer Neg Hx      Endometrial cancer Neg Hx      Ovarian cancer Neg Hx        Current Medications:  Current Outpatient Prescriptions   Medication Sig Dispense Refill     amLODIPine (NORVASC) 2.5 MG tablet TAKE 1 TABLET BY MOUTH DAILY. 30 tablet 0     ASPIRIN/ACETAMINOPHEN/CAFFEINE (EXCEDRIN MIGRAINE ORAL)        bismuth subsalicylate (KAOPECTATE EX STR, BISMUTH SS,) 525 mg/15 mL Susp Take 15 mL by mouth every hour as needed (MAX dose 8.). 240 mL 1     gabapentin (NEURONTIN) 300 MG capsule Take 600 mg by mouth 3 (three) times a day.       hydroCHLOROthiazide (HYDRODIURIL) 25 MG tablet TAKE 1 TABLET (25 MG TOTAL) BY MOUTH DAILY. 30 tablet 8     lisinopril (PRINIVIL,ZESTRIL) 20 MG tablet TAKE 1 TABLET BY MOUTH DAILY. 30 tablet 0     morphine (MS CONTIN) 30 MG 12 hr tablet Take 1 tablet by mouth 3 (three) times a day.  0     oxyCODONE-acetaminophen (PERCOCET) 7.5-325 mg per tablet Take 1 tablet by mouth every 6 (six) hours as needed for pain. 90 tablet 0     polyethylene glycol (MIRALAX) 17 gram packet Take 1 packet (17 g total) by mouth daily. MIX 17 GRAMS IN 8 OUNCES OF WATER AND DRINK ONCE DAILY 14 each 6     No current facility-administered medications for this visit.      Facility-Administered Medications Ordered in Other Visits   Medication Dose Route Frequency Provider Last Rate Last Dose     iohexol 350 mg iodine/mL injection 100 mL (OMNIPAQUE)  100 mL Intravenous Once in imaging Mitchell Villa MD           Allergies:  Hydrocodone-acetaminophen and Ibuprofen    Review or Systems:  Constitution: normal  HEENT:  normal  Pulmonary: normal  Cardiovascular: normal  GI: normal  : normal  Musculoskeletal: normal  Neuro: normal  Endocrine: normal  Psych: normal  Lymph/Heme: normal    OBJECTIVE:  Vitals:    06/14/17 1004   BP: 116/88   Pulse: 68   Resp: 20   Temp: 97.6  F (36.4  C)   SpO2: 98%     General Appearance:    Alert, cooperative, no distress, appears stated age   Head:    Normocephalic, without obvious abnormality, atraumatic   Eyes:    PERRL, conjunctiva/corneas clear, EOM's intact   Nose:   Mucosa moist, normal    Throat:   Lips, mucosa, and tongue normal; ora phaynx clear   Neck:   Supple, symmetrical, trachea midline, no adenopathy;     thyroid:  no enlargement/tenderness/nodules; no carotid    bruit or JVD   Lungs:     Clear to auscultation bilaterally, respirations unlabored    Heart:    Regular rate and rhythm, S1 and S2 normal, no murmur, rub   or gallop   Abdomen:     Soft, non-tender, bowel sounds active all four quadrants,     no masses, no organomegaly   Extremities:   Extremities normal, atraumatic, no cyanosis or edema       Skin:   Skin color, texture, turgor normal, no rashes or lesions   Neurologic:   No focal deficits         Labs:  Electrocardiogram personally reviewed  Borderline prolonged QT interval  Nonspecific ST changes with T-wave flattening in lateral and inferior leads, inverted T waves V3 through V6.  Abnormal EKG    ASSESSMENT/PLAN:  1. Preop examination  Hemoglobin    Potassium    Electrocardiogram Perform - Clinic   2. Hypertension     3. Surgical menopause     4. Hypercholesteremia  HDL Cholesterol    Cholesterol, Total     Abnormal EKG which is new.    Patient has good exercise tolerance, low to intermediate risk surgery, okay for procedure.    Morphine alone on morning of surgery.    Await potassium and hemoglobin results.  We will forward these when available    Also, will stop Sprintec which he previously was on, start Estrace for maintenance of hot flashes, surgical  menopause.  Jaime Keane MD
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
## 2021-06-14 NOTE — PROGRESS NOTES
Assessment/ Plan  Problem List Items Addressed This Visit        Unprioritized    Osteoarthritis of knees, bilateral - Primary     Encourage walking, order written for physical therapy for from current outside of [facility] provider to include knees.         Edema     No signs of cardiac problems, denies snoring, recent workup negative for secondary causes.  Will check BMP and UA today.  Add compression stockings.  Also, because of chronic hypokalemia, will stop hydrochlorothiazide.  Follow-up 4-6 weeks.  This may make edema worse while the stockings hopefully help         Relevant Orders    Basic Metabolic Panel    ALT (SGPT)    Urinalysis-UC if Indicated    Surgical menopause     Discussed standard of care being to stop estrogen replacement at approximately 49 years of age.  Understands risks of estrogen including stroke and venous thromboembolism.  At this point, we are going to stop hydrochlorothiazide, I am worried about potential side effects with this.  We will see her back and then discuss stopping the estrogen at a later date         Healthcare maintenance     Recommend mammogram which she is going to set up.           Other Visit Diagnoses     HTN (hypertension)        Relevant Medications    lisinopril (PRINIVIL,ZESTRIL) 20 MG tablet    Other Relevant Orders    Basic Metabolic Panel    ALT (SGPT)    Urinalysis-UC if Indicated    Visit for screening mammogram        Relevant Orders    Mammo Screening Bilateral        Subjective  CC:  Chief Complaint   Patient presents with     Knee Pain     both side     HPI:  bilat Knee Pain  Narrative: Previous history of knee pain, has been the lesser of her problems recently with significant cervical spine issue  Notes:  Duration/ timing of onset:3-4 weeks ago, gradual onset  Location of pain entire knee    Severity/ Quality: stiff knee  Modifying factors: Make it worse- standing and stairs   Make it better- no  History of knee problems/injury or previous work-up/  treatment? no  Clicking or popping? yes  Swelling? Whole leg has been swollen.  Comments Knee x-rays 1/8/14 showed mild medial compartment narrowing bilaterally  She was seen on that day with acute arthritis and swelling of the left knee.      Patient with lower extremity edema that is worsened recently.  She is under the belief that the knee problem and the leg problem are the same thing.  Discussed that I think this is somewhat unlikely.  Has had lower extremity edema worked up in the past.  Was seen in the emergency room 8/25/17 with this and also, 9/27/17.  See my discussion from that time.  She was on both amlodipine and gabapentin.  Had her stop the amlodipine, continue the gabapentin.  This was stopped for a while and just recently restarted.  Thus, she was off of gabapentin and amlodipine when her leg swelling started to get worse today.    Denies PND, orthopnea, shortness of breath, chest pains.    Currently undergoing back and neck therapy as well as pelvic floor therapy    Hysterectomy 1993.  Still, at 52, is taking estrogen only HRT  PFSH:  Patient Active Problem List   Diagnosis     Hypokalemia     Hematuria     Urge Incontinence Of Urine     Chronic Constipation     Cervical Spine Stenosis     Generalized anxiety disorder     Patellofemoral Syndrome Of The Left Knee     Hypercholesteremia     Major Depression, Recurrent     Hypertension     Generalized Osteoarthritis     Cervical Disc Degeneration     Lumbar Radiculopathy     Bladder wall thickening     Hydronephrosis, right     Constipation     Hypomagnesemia     Brachial neuritis or radiculitis NOS     Health care maintenance     Chronic pain     Osteoarthritis of knees, bilateral     Edema     Surgical menopause     Healthcare maintenance     Current medications reviewed as follows:  Current Outpatient Prescriptions on File Prior to Visit   Medication Sig     ASPIRIN/ACETAMINOPHEN/CAFFEINE (EXCEDRIN MIGRAINE ORAL)      bismuth subsalicylate  "(KAOPECTATE EX STR, BISMUTH SS,) 525 mg/15 mL Susp Take 15 mL by mouth every hour as needed (MAX dose 8.).     gabapentin (NEURONTIN) 300 MG capsule Take 600 mg by mouth 3 (three) times a day.     hydroCHLOROthiazide (HYDRODIURIL) 25 MG tablet TAKE 1 TABLET (25 MG TOTAL) BY MOUTH DAILY.     morphine (MS CONTIN) 30 MG 12 hr tablet Take 1 tablet by mouth 3 (three) times a day.     oxyCODONE-acetaminophen (PERCOCET) 7.5-325 mg per tablet Take 1 tablet by mouth every 6 (six) hours as needed for pain.     polyethylene glycol (MIRALAX) 17 gram packet Take 1 packet (17 g total) by mouth daily. MIX 17 GRAMS IN 8 OUNCES OF WATER AND DRINK ONCE DAILY     [DISCONTINUED] estradiol (ESTRACE) 2 MG tablet Take 1 tablet (2 mg total) by mouth daily.     [DISCONTINUED] lisinopril (PRINIVIL,ZESTRIL) 20 MG tablet TAKE 1 TABLET BY MOUTH DAILY.     No current facility-administered medications on file prior to visit.      History   Smoking Status     Never Smoker   Smokeless Tobacco     Never Used     Social History     Social History Narrative     Patient Care Team:  Jaime Keane MD as PCP - General  ROS  Full 10 system review including constitutional, respiratory, cardiac, gi, urinary, rheumatologic, neurologic, reproductive, dermatologic psychiatric is  performed (via questionnaire) and is negative       Objective  Physical Exam  Vitals:    12/14/17 1132   BP: 124/76   Pulse: 65   Resp: 20   SpO2: 99%   Weight: 212 lb (96.2 kg)   Height: 5' 3\" (1.6 m)     Body mass index is 37.55 kg/(m^2).  Gen- alert, oriented/ appropriately responsive  HEENT- normal cephalic, atraumatic.   Chest- Normal inspiration and expiration.  Clear to ascultation.  No chest wall deformity or scar.  CV- Heart regular rate and rhythm, normal tones, no murmurs gallops or rubs.  Ext- appear well perfused, 1+ lower extremity edema  Left knee is normal on inspection.  There is no sign of effusion and overlying skin is not erythematous.  Palpation of the knee " shows  No tenderness of the patella and negative patellar compression test.  Moderate tenderness of the medial joint line.  Negative tenderness of the lateral joint line.  There is no tenderness with stress of the MCL or LCL.  Lockman's test is negative.  Dominga's test is negative    Repeated on the right knee and is similar    Skin- warm and dry, no visualized rash    Diagnostics:         Please note: Voice recognition software was used in this dictation.  It may therefore contain typographical errors.

## 2021-06-14 NOTE — TELEPHONE ENCOUNTER
Attempts made to contact patient in regards to appointment scheduled for today with this writer at 10 am.  Patient did not link onto video visit nor answer the phone with attempts made, therefore left patient a detailed message including call center contact information to call and reschedule this appointment at her earliest convenience.

## 2021-06-15 NOTE — TELEPHONE ENCOUNTER
RN cannot approve Refill Request    RN can NOT refill this medication med is not covered by policy/route to provider. Last office visit: 12/1/2020 Jaime Keane MD Last Physical: 5/24/2018 Last MTM visit: Visit date not found Last visit same specialty: 12/1/2020 Jaime Keane MD.  Next visit within 3 mo: Visit date not found  Next physical within 3 mo: Visit date not found      Anais Diamond, [facility] Triage/Med Refill 2/28/2021    Requested Prescriptions   Pending Prescriptions Disp Refills     celecoxib (CELEBREX) 100 MG capsule [Pharmacy Med Name: Celecoxib Oral Capsule 100 MG] 60 capsule 0     Sig: TAKE ONE CAPSULE BY MOUTH TWICE DAILY       There is no refill protocol information for this order

## 2021-06-16 PROBLEM — M17.0 OSTEOARTHRITIS OF KNEES, BILATERAL: Status: ACTIVE | Noted: 2017-12-14

## 2021-06-16 PROBLEM — M25.562 CHRONIC PATELLOFEMORAL PAIN OF BOTH KNEES: Status: ACTIVE | Noted: 2020-01-30

## 2021-06-16 PROBLEM — R60.9 EDEMA: Status: ACTIVE | Noted: 2017-12-14

## 2021-06-16 PROBLEM — G89.29 CHRONIC PATELLOFEMORAL PAIN OF BOTH KNEES: Status: ACTIVE | Noted: 2020-01-30

## 2021-06-16 PROBLEM — Z00.00 HEALTHCARE MAINTENANCE: Status: ACTIVE | Noted: 2017-12-14

## 2021-06-16 PROBLEM — M25.561 CHRONIC PATELLOFEMORAL PAIN OF BOTH KNEES: Status: ACTIVE | Noted: 2020-01-30

## 2021-06-16 PROBLEM — R26.89 IMPAIRMENT OF BALANCE: Status: ACTIVE | Noted: 2017-02-28

## 2021-06-16 PROBLEM — R53.81 PHYSICAL DECONDITIONING: Status: ACTIVE | Noted: 2018-05-31

## 2021-06-16 PROBLEM — E89.40 SURGICAL MENOPAUSE: Status: ACTIVE | Noted: 2017-12-14

## 2021-06-16 NOTE — TELEPHONE ENCOUNTER
hydroCHLOROthiazide (HYDRODIURIL) 25 MG tablet [[id number]]    Electronically signed by: Teresita Estrada MD on 01/18/21 2050 Status: Discontinued   Ordering user: Teresita Estrada MD 01/18/21 2050 Authorized by: Teresita Estrada MD   Frequency: DAILY 01/18/21 - 02/28/21  Released by: Teresita Estrada MD 01/18/21 2050   Discontinued by: Jaime Keane MD 02/28/21 0905   Diagnoses   Essential hypertension [I10]

## 2021-06-16 NOTE — TELEPHONE ENCOUNTER
DOD: Please see message below. Ok to write an order for the undergarments or is this ok to wait for  tomorrow? Please advise. Thanks.

## 2021-06-16 NOTE — TELEPHONE ENCOUNTER
Telephone Encounter by Rachele Juarez LPN at 11/1/2019  1:41 PM     Author: Rachele Juarez LPN Service: -- Author Type: Licensed Nurse    Filed: 11/1/2019  1:44 PM Encounter Date: 11/1/2019 Status: Signed    : Rachele Juarez LPN (Licensed Nurse)       Patient Returning Call  Reason for call:  Patient call  Information relayed to patient:  Jaime Keane MD   to Chavo Gamino MA          11/1/19 1:02 PM   Note      Okay, letter printed.      Patient has additional questions:  Yes  If YES, what are your questions/concerns:  Patient requested to fax letter to [facility]  Fax #  [phone].  Okay to leave a detailed message?: No

## 2021-06-16 NOTE — TELEPHONE ENCOUNTER
Reason for Call:  Other prescription     Detailed comments: pt is calling requesting ref to [facility] for her undergarments( not diapers), she said we faxed a ref already but it was dated 2019. [facility] said they need a new rx.     Phone Number Patient can be reached at: Home number on file [phone] (home)    Best Time: any    Can we leave a detailed message on this number?: Yes    Call taken on 3/22/2021 at 10:01 AM by Nallely Wheat

## 2021-06-16 NOTE — TELEPHONE ENCOUNTER
In order for ping to cover these he needs to put specific reason why she needs them and the  amount needed  Sometimes uses 12 times a day she needs to double up on pullups    Loses control of bladder due to pressure she needs to have  back surgery shes holding off until after the Covid epidemic

## 2021-06-16 NOTE — TELEPHONE ENCOUNTER
Called and left detailed message letting pt know we faxed order to [facility] at fax number [phone]

## 2021-06-16 NOTE — TELEPHONE ENCOUNTER
RN Triage:    Received call from [facility]ga (confirmed by caller ID) requesting patient phone number so they can contact her about the order for incontinence supplies that we sent over this morning.    Tania Prieto RN  [facility] Nurse Advisor

## 2021-06-16 NOTE — TELEPHONE ENCOUNTER
Refill Approved    Rx renewed per Medication Renewal Policy. Medication was last renewed on 1/18/21.    Onofre Medeiros, [facility] Triage/Med Refill 3/17/2021     Requested Prescriptions   Pending Prescriptions Disp Refills     hydroCHLOROthiazide (HYDRODIURIL) 25 MG tablet [Pharmacy Med Name: hydroCHLOROthiazide Oral Tablet 25 MG] 30 tablet 0     Sig: Take 1 tablet (25 mg total) by mouth daily.       Diuretics/Combination Diuretics Refill Protocol  Passed - 3/16/2021 11:30 AM        Passed - Visit with PCP or prescribing provider visit in past 12 months     Last office visit with prescriber/PCP: Visit date not found OR same dept: 12/1/2020 Jaime Keane MD OR same specialty: 12/1/2020 Jaime Keane MD  Last physical: Visit date not found Last MTM visit: Visit date not found   Next visit within 3 mo: Visit date not found  Next physical within 3 mo: Visit date not found  Prescriber OR PCP: Teresita Estrada MD  Last diagnosis associated with med order: 1. Hypertension  - hydroCHLOROthiazide (HYDRODIURIL) 25 MG tablet [Pharmacy Med Name: hydroCHLOROthiazide Oral Tablet 25 MG]; Take 1 tablet (25 mg total) by mouth daily.  Dispense: 30 tablet; Refill: 0  - amLODIPine (NORVASC) 10 MG tablet [Pharmacy Med Name: amLODIPine Besylate Oral Tablet 10 MG]; Take 1 tablet (10 mg total) by mouth daily.  Dispense: 30 tablet; Refill: 0  - lisinopriL (PRINIVIL,ZESTRIL) 20 MG tablet [Pharmacy Med Name: Lisinopril Oral Tablet 20 MG]; Take 1 tablet (20 mg total) by mouth daily.  Dispense: 30 tablet; Refill: 0    If protocol passes may refill for 12 months if within 3 months of last provider visit (or a total of 15 months).             Passed - Serum Potassium in past 12 months      Lab Results   Component Value Date    Potassium 3.8 12/01/2020             Passed - Serum Sodium in past 12 months      Lab Results   Component Value Date    Sodium 141 12/01/2020             Passed - Blood pressure on file in  past 12 months     BP Readings from Last 1 Encounters:   12/01/20 100/66             Passed - Serum Creatinine in past 12 months      Creatinine   Date Value Ref Range Status   12/01/2020 0.97 0.60 - 1.10 mg/dL Final                amLODIPine (NORVASC) 10 MG tablet [Pharmacy Med Name: amLODIPine Besylate Oral Tablet 10 MG] 30 tablet 0     Sig: Take 1 tablet (10 mg total) by mouth daily.       Calcium-Channel Blockers Protocol Passed - 3/16/2021 11:30 AM        Passed - PCP or prescribing provider visit in past 12 months or next 3 months     Last office visit with prescriber/PCP: Visit date not found OR same dept: 12/1/2020 Jaime eKane MD OR same specialty: 12/1/2020 Jaime Keane MD  Last physical: Visit date not found Last MTM visit: Visit date not found   Next visit within 3 mo: Visit date not found  Next physical within 3 mo: Visit date not found  Prescriber OR PCP: Teresita Estrada MD  Last diagnosis associated with med order: 1. Hypertension  - hydroCHLOROthiazide (HYDRODIURIL) 25 MG tablet [Pharmacy Med Name: hydroCHLOROthiazide Oral Tablet 25 MG]; Take 1 tablet (25 mg total) by mouth daily.  Dispense: 30 tablet; Refill: 0  - amLODIPine (NORVASC) 10 MG tablet [Pharmacy Med Name: amLODIPine Besylate Oral Tablet 10 MG]; Take 1 tablet (10 mg total) by mouth daily.  Dispense: 30 tablet; Refill: 0  - lisinopriL (PRINIVIL,ZESTRIL) 20 MG tablet [Pharmacy Med Name: Lisinopril Oral Tablet 20 MG]; Take 1 tablet (20 mg total) by mouth daily.  Dispense: 30 tablet; Refill: 0    If protocol passes may refill for 12 months if within 3 months of last provider visit (or a total of 15 months).             Passed - Blood pressure filed in past 12 months     BP Readings from Last 1 Encounters:   12/01/20 100/66                lisinopriL (PRINIVIL,ZESTRIL) 20 MG tablet [Pharmacy Med Name: Lisinopril Oral Tablet 20 MG] 30 tablet 0     Sig: Take 1 tablet (20 mg total) by mouth daily.       Ace  Inhibitors Refill Protocol Passed - 3/16/2021 11:30 AM        Passed - PCP or prescribing provider visit in past 12 months       Last office visit with prescriber/PCP: Visit date not found OR same dept: 12/1/2020 Jaime Kaene MD OR same specialty: 12/1/2020 Jaime Keane MD  Last physical: Visit date not found Last MTM visit: Visit date not found   Next visit within 3 mo: Visit date not found  Next physical within 3 mo: Visit date not found  Prescriber OR PCP: Teresita Estrada MD  Last diagnosis associated with med order: 1. Hypertension  - hydroCHLOROthiazide (HYDRODIURIL) 25 MG tablet [Pharmacy Med Name: hydroCHLOROthiazide Oral Tablet 25 MG]; Take 1 tablet (25 mg total) by mouth daily.  Dispense: 30 tablet; Refill: 0  - amLODIPine (NORVASC) 10 MG tablet [Pharmacy Med Name: amLODIPine Besylate Oral Tablet 10 MG]; Take 1 tablet (10 mg total) by mouth daily.  Dispense: 30 tablet; Refill: 0  - lisinopriL (PRINIVIL,ZESTRIL) 20 MG tablet [Pharmacy Med Name: Lisinopril Oral Tablet 20 MG]; Take 1 tablet (20 mg total) by mouth daily.  Dispense: 30 tablet; Refill: 0    If protocol passes may refill for 12 months if within 3 months of last provider visit (or a total of 15 months).             Passed - Serum Potassium in past 12 months     Lab Results   Component Value Date    Potassium 3.8 12/01/2020             Passed - Blood pressure filed in past 12 months     BP Readings from Last 1 Encounters:   12/01/20 100/66             Passed - Serum Creatinine in past 12 months     Creatinine   Date Value Ref Range Status   12/01/2020 0.97 0.60 - 1.10 mg/dL Final

## 2021-06-16 NOTE — TELEPHONE ENCOUNTER
Patient called back, she needs a size large. Please fax over referral to [facility]. Please call patient when referral has been sent.

## 2021-06-16 NOTE — TELEPHONE ENCOUNTER
"Attempted to call pt. Kept getting a busy dial tone, will try again later What size does pt need?   \" Okay to relay message\"    "

## 2021-06-16 NOTE — TELEPHONE ENCOUNTER
RN cannot approve Refill Request    RN can NOT refill this medication med is not covered by policy/route to provider. Last office visit: 12/1/2020 Jaime Keane MD Last Physical: 5/24/2018 Last MTM visit: Visit date not found Last visit same specialty: 12/1/2020 Jaime Keane MD.  Next visit within 3 mo: Visit date not found  Next physical within 3 mo: Visit date not found      Emma Lizarraga, [facility] Triage/Med Refill 4/15/2021    Requested Prescriptions   Pending Prescriptions Disp Refills     celecoxib (CELEBREX) 100 MG capsule [Pharmacy Med Name: Celecoxib Oral Capsule 100 MG] 60 capsule 0     Sig: TAKE ONE CAPSULE BY MOUTH TWICE DAILY       There is no refill protocol information for this order

## 2021-06-16 NOTE — TELEPHONE ENCOUNTER
Please clarify- is this for the DIEGO protective underwear she has had from [employer] in the past?  If so- what size.  Also if sometimes 12, is 10/ day= 360 per month appropriate?

## 2021-06-16 NOTE — TELEPHONE ENCOUNTER
Pt called yes it is the DIEGO and the amount would be appropriate  for her she would like this called into [address] [phone]     They will cover her insurance whereas Sarah or Dexter leblanc cover     Thank you

## 2021-06-18 NOTE — PROGRESS NOTES
[facility]      Facility:    [facility] [[id number]]  Code Status: FULL CODE      Chief Complaint/Reason for Visit:  Chief Complaint   Patient presents with     H & P       HPI:   Sridevi is a 52 y.o. female who underwent cervical spinal fusion regarding cervical spinal stenosis. She has chronic pain in regards to this and follows with the pain clinic.  She also has hypertension.  When I asked her about Prinzmetal's angina, she was unaware of any heart problems.  She does have listed in her electronic medical record anxiety and depression and lumbar radiculopathy as well as chronic constipation and urge incontinence of urine and has had past surgical procedures of abdominal hysterectomy, and appendectomy.      Upon current review of systems she is having particularly problems with headache.  At home she uses 2 pills of Excedrin Migraine twice daily and this controls symptoms.  She is not having problems with fevers or chills, cough or shortness of breath or chest pain or abdominal pain or nausea.    Past Medical History:  Past Medical History:   Diagnosis Date     Anxiety disorder      Arthritis      Bladder wall thickening      Brachial neuritis or radiculitis      Cervical spinal stenosis      Chronic pain      Constipation      Degenerative cervical disc      Edema      Generalized anxiety disorder      Hematuria      Hydronephrosis     right     Hypercholesteremia      Hypercholesteremia      Hypertension      Hypertension      Hypokalemia      Hypomagnesemia      Lumbar radiculopathy      Major depression      Osteoarthritis      Patellofemoral syndrome     left knee     Prinzmetal's angina      Surgical menopause      Urge incontinence of urine            Surgical History:  Past Surgical History:   Procedure Laterality Date     ANTERIOR FUSION CERVICAL SPINE  05/2018    C2 TO C 5 WITH CORPECTOMY C3-4, POSTERIOR C2-C6     HYSTERECTOMY  1993     OOPHORECTOMY Bilateral 1993      ORIF WRIST FRACTURE Right 1998     WI APPENDECTOMY      Description: Appendectomy;  Recorded: 11/01/2011;     WI ARTHRODESIS ANT INTERBODY MIN DISCECTOMY, CERVICAL BELOW C2  1992    Description: Cervical Vertebral Fusion;  Recorded: 11/01/2011;     WI TOTAL ABDOM HYSTERECTOMY      Description: Total Abdominal Hysterectomy;  Proc Date: 01/01/1993;       Family History:   Family History   Problem Relation Age of Onset     No Medical Problems Mother      No Medical Problems Father      No Medical Problems Sister      No Medical Problems Daughter      No Medical Problems Maternal Grandmother      No Medical Problems Maternal Grandfather      No Medical Problems Paternal Grandmother      No Medical Problems Paternal Grandfather      No Medical Problems Maternal Aunt      No Medical Problems Paternal Aunt      BRCA 1/2 Neg Hx      Breast cancer Neg Hx      Cancer Neg Hx      Colon cancer Neg Hx      Endometrial cancer Neg Hx      Ovarian cancer Neg Hx        Social History:    Social History     Social History     Marital status: Single     Spouse name: N/A     Number of children: N/A     Years of education: N/A     Social History Main Topics     Smoking status: Never Smoker     Smokeless tobacco: Never Used     Alcohol use Yes     Drug use: No     Sexual activity: Not on file     Other Topics Concern     Not on file     Social History Narrative          Review of Systems   All other systems reviewed and are negative.      Vitals:    06/01/18 1720   BP: 106/68   Pulse: 60   Resp: 20   Temp: 97.5  F (36.4  C)   SpO2: 98%       Physical Exam   Constitutional: No distress.   She easily drifts to sleep   HENT:   Mouth/Throat: Oropharynx is clear and moist.   Eyes: Right eye exhibits no discharge. Left eye exhibits no discharge.   Neck:   Cervical collar in place   Cardiovascular: Normal rate, regular rhythm and normal heart sounds.    Pulmonary/Chest: Effort normal and breath sounds normal. No respiratory distress.    Abdominal: Soft. Bowel sounds are normal. She exhibits no distension. There is no tenderness.   Musculoskeletal: She exhibits no edema or tenderness.   Neurological: She is alert.   Skin: Skin is warm and dry.   Psychiatric: She has a normal mood and affect.   Nursing note and vitals reviewed.      Medication List:  Current Outpatient Prescriptions   Medication Sig     aspirin-acetaminophen-caffeine (EXCEDRIN MIGRAINE) 250-250-65 mg per tablet Take 2 tablets by mouth 2 (two) times a day as needed for pain.     oxyCODONE (ROXICODONE) 10 mg immediate release tablet Take 20 mg by mouth every 4 (four) hours as needed for pain.     acetaminophen (TYLENOL) 325 MG tablet Take 325-650 mg by mouth every 6 (six) hours as needed for pain.     amLODIPine (NORVASC) 10 MG tablet Take 10 mg by mouth daily.     aspirin 325 MG tablet Take 325 mg by mouth daily.     gabapentin (NEURONTIN) 300 MG capsule Take 600 mg by mouth 2 (two) times a day.     gabapentin (NEURONTIN) 300 MG capsule Take 900 mg by mouth at bedtime.     hydroCHLOROthiazide (HYDRODIURIL) 25 MG tablet Take 25 mg by mouth daily.     lisinopril (PRINIVIL,ZESTRIL) 2.5 MG tablet Take 2.5 mg by mouth daily.     methocarbamol (ROBAXIN) 750 MG tablet Take 750 mg by mouth 3 (three) times a day.     nitroglycerin (NITROSTAT) 0.4 MG SL tablet Place 0.4 mg under the tongue every 5 (five) minutes as needed for chest pain.     oxyCODONE (ROXICODONE) 10 mg immediate release tablet Take 10 mg by mouth every 4 (four) hours as needed for pain.     polyethylene glycol (MIRALAX) 17 gram packet Take 17 g by mouth daily.     senna-docusate (SENNOSIDES-DOCUSATE SODIUM) 8.6-50 mg tablet Take 1-4 tablets by mouth 2 (two) times a day.       Labs:  No new laboratory testing    Assessment:    ICD-10-CM    1. Cervical Spine Stenosis M48.02    2. Other chronic pain G89.29    3. Chronic nonintractable headache, unspecified headache type R51    4. S/P cervical spinal fusion Z98.1    5.  Hypertension I10        Plan:  Excedrin Migraine was ordered as 2 pills twice daily as needed for headache.  Total Tylenol is not to exceed 4000 mg per day.  The Tylenol as needed was changed to 500 mg 1 pill every 4 hours as needed pain.  Since the Excedrin Migraine has 250 mg of acetaminophen per tablet, it would be possible to combine the 2 Excedrin Migraine pills with 1 of the extra strength Tylenol and still not exceed maximum dosing per single dose of Tylenol.  In regards to the constipation problems, senna S was written as 2 pills twice daily scheduled and MiraLAX 17 g daily scheduled.  Pads for the collar were to be ordered and change as needed      Electronically signed by: Ray Yoder MD

## 2021-06-18 NOTE — PROGRESS NOTES
[facility] Patient Outreach:     Discharge Date::  6/14/18      Reason for TCU stay (discharge diagnosis)::  Anterior fusion with corpectomy C3-4 and posterior fusion C2-6      Are you feeling better, the same or worse since your discharge?:  Patient is feeling the same          As part of your discharge plan, did they discuss home care with you?: Yes        Have your seen them yet, or are they scheduled to visit?: No        Did you receive any new medications, or was there a change to your medications?: No            Do you have any follow up visits scheduled with your PCP or Specialist?:  Yes, with PCP      (RN) Is it scheduled soon enough (3-5 days)?: Yes (Patient was unaware of PCP appt for 6/20/18.  She'll have to confirm with her PCA that they can take her, otherwise she'll call to re-schedule.  )        I'm glad to hear you're doing well and we want you to continue to do well. Your PCP would like to see you for a follow-up visit. Can we help set that up for your today?: No        (RN) Provided patient the PCP's phone number to call if they have any questions or concerns?: Yes

## 2021-06-18 NOTE — PROGRESS NOTES
[facility]    Facility:   [facility] [[id number]]   Code Status: FULL CODE      CHIEF COMPLAINT/REASON FOR VISIT:  Chief Complaint   Patient presents with     Problem Visit     SI, Depression       HISTORY:      HPI: Sridevi is a 52 y.o. female presenting to [facility] TCU for rehabilitation s/p having a surgical repair of cervical spinal stenosis. She was admitted to [facility] on 5/25/2018 and underwent an anterior fusion with corpectomy C3-4 and posterior fusion of C2-C6. She was discharged to [facility] on 5/30/2018. She has a complex past medical history outlined below in the PMH table. Her hospitalization has been summarized by the discharging physician:     Hospitalization summarized in previous notes.     Today she is being evaluated for depression with suicidal ideation. Patient states that she is very depressed and has been for years. She has never been treated for it. It is something that has gone hand in hand with her chronic pain issues. She has had suicidal ideation before but never has had a plan. She does not have a plan now and no means to go through with anything. She states she would never do it. She states being black or  it is a big stigma to have mental health issues and she has not had support from her family. They have told her to just be tough and get over it. She and I have a lengthy discussion about chemical imbalance and stress. We also discuss the fact that depression is a disease just like DMII or HTN. This allows her to open up and she is willing to divulge more. She has talked about laying in bed for hours at home and not moving wishing she is dead. She is alive because she couldn't do that to her family, especially her nephews who she is close with. She is happy to work with a psychotherapist or psychiatrist. She is open to trying an antidepressant. She has a history of anxiety and has gone to the ER with panic  attacks before where she thought she was going to have a heart attack. She denies any other concerns including fevers/chills, cough or cold symptoms, headaches, vision changes, chest pain/pressure, difficulty breathing, SOB, abdominal pain, nausea, vomiting, diarrhea, dysuria, increasing weakness, increasing pain. Nursing staff have no other concerns for her at this time. PT/OT have been going well.     Past Medical History:   Diagnosis Date     Anxiety disorder      Arthritis      Bladder wall thickening      Brachial neuritis or radiculitis      Cervical spinal stenosis      Chronic pain      Constipation      Degenerative cervical disc      Edema      Generalized anxiety disorder      Hematuria      Hydronephrosis     right     Hypercholesteremia      Hypercholesteremia      Hypertension      Hypertension      Hypokalemia      Hypomagnesemia      Lumbar radiculopathy      Major depression      Osteoarthritis      Patellofemoral syndrome     left knee     Prinzmetal's angina      Surgical menopause      Urge incontinence of urine              Family History   Problem Relation Age of Onset     No Medical Problems Mother      No Medical Problems Father      No Medical Problems Sister      No Medical Problems Daughter      No Medical Problems Maternal Grandmother      No Medical Problems Maternal Grandfather      No Medical Problems Paternal Grandmother      No Medical Problems Paternal Grandfather      No Medical Problems Maternal Aunt      No Medical Problems Paternal Aunt      BRCA 1/2 Neg Hx      Breast cancer Neg Hx      Cancer Neg Hx      Colon cancer Neg Hx      Endometrial cancer Neg Hx      Ovarian cancer Neg Hx      Social History     Social History     Marital status: Single     Spouse name: N/A     Number of children: N/A     Years of education: N/A     Social History Main Topics     Smoking status: Never Smoker     Smokeless tobacco: Never Used     Alcohol use Yes     Drug use: No     Sexual activity:  Not on file     Other Topics Concern     Not on file     Social History Narrative       REVIEW OF SYSTEM:  Pertinent items are noted in HPI.    PHYSICAL EXAM:   /68  Pulse 66  Temp 97.6  F (36.4  C)  Resp 18  Wt 214 lb 6.4 oz (97.3 kg)  LMP 01/05/1993  SpO2 92%  BMI 38.59 kg/m2  General appearance: alert, appears stated age and cooperative  Head: Normocephalic, without obvious abnormality, atraumatic  Neck: no adenopathy, no carotid bruit, no JVD, supple, symmetrical, trachea midline and thyroid not enlarged, symmetric, no tenderness/mass/nodules, incisions bandaged posteriorly and anteriorly and are clean/dry/intact  Lungs: clear to auscultation bilaterally  Heart: regular rate and rhythm, S1, S2 normal, no murmur, click, rub or gallop  Abdomen: soft, non-tender; bowel sounds normal; no masses,  no organomegaly  Extremities: extremities normal, atraumatic, no cyanosis or edema  Pulses: 2+ and symmetric  Skin: Skin color, texture, turgor normal. No rashes or lesions  Neurologic: Grossly normal      LABS:   None today.     ASSESSMENT:      ICD-10-CM    1. Major depressive disorder F32.9        PLAN:    Depression   -PHQ9.   -Psych consult.   -Duloxetine 30 mg by mouth daily for one week, then increase to 60 mg by mouth daily.   -Close monitoring- discussed risks of depression with suicidal ideation when starting a new antidepressant.   -Follow up carefully.     Physical Deconditioning  -Continue PT/OT and other therapies as per care plan.  -Encouraged good nutrition and movement habits.   -Discussed care plan and expected course of stay.   -Continue to follow-up per routine schedule or sooner if needed.     Otherwise continue current care plan for all other chronic medical conditions, as they are stable. Encouraged patient to engage in healthy lifestyle behaviors such as engaging in social activities, exercising (PT/OT), eating well, and following care plan. Follow up for routine check-up, or sooner if  needed. Will continue to monitor patient and work with nursing staff collaboratively to work toward positive patient outcomes.    Greater than 35 minutes spent with patient with at least 55% of this time spent on review of previous records, counseling, education, and discussion of the above care plan with nursing staff, and patient.     Electronically signed by: Celia Aburto CNP

## 2021-06-18 NOTE — PROGRESS NOTES
[facility]    Facility:   [facility] [[id number]]   Code Status: FULL CODE      CHIEF COMPLAINT/REASON FOR VISIT:  Chief Complaint   Patient presents with     Review Of Multiple Medical Conditions     physical deconditioning, cervical stenosis, brachial neuritis       HISTORY:      HPI: Sridevi is a 52 y.o. female presenting to [facility]CU for rehabilitation s/p having a surgical repair of cervical spinal stenosis. She was admitted to [facility] on 5/25/2018 and underwent an anterior fusion with corpectomy C3-4 and posterior fusion of C2-C6. She was discharged to [facility] on 5/30/2018. She has a complex past medical history outlined below in the PMH table. Her hospitalization has been summarized by the discharging physician:     Hospitalization summarized in previous notes.     Today she is being evaluated for routine review of medical problems while in the TCU. She states things have been going relatively well since our last visit. She has been positive and upbeat. She feels things are looking up. She does not have any acute medical issues she would like to discuss today. She did have some urine issues including cloudiness over the weekend and an UA/UC were ordered and negative. She has no further symptoms or concerns. She denies any other concerns including fevers/chills, cough or cold symptoms, headaches, vision changes, chest pain/pressure, difficulty breathing, SOB, abdominal pain, nausea, vomiting, diarrhea, dysuria, increasing weakness, increasing pain. Nursing staff have no other concerns for her at this time. PT/OT have been going well.     Past Medical History:   Diagnosis Date     Anxiety disorder      Arthritis      Bladder wall thickening      Brachial neuritis or radiculitis      Cervical spinal stenosis      Chronic pain      Constipation      Degenerative cervical disc      Edema      Generalized anxiety disorder      Hematuria       Hydronephrosis     right     Hypercholesteremia      Hypercholesteremia      Hypertension      Hypertension      Hypokalemia      Hypomagnesemia      Lumbar radiculopathy      Major depression      Osteoarthritis      Patellofemoral syndrome     left knee     Prinzmetal's angina      Surgical menopause      Urge incontinence of urine              Family History   Problem Relation Age of Onset     No Medical Problems Mother      No Medical Problems Father      No Medical Problems Sister      No Medical Problems Daughter      No Medical Problems Maternal Grandmother      No Medical Problems Maternal Grandfather      No Medical Problems Paternal Grandmother      No Medical Problems Paternal Grandfather      No Medical Problems Maternal Aunt      No Medical Problems Paternal Aunt      BRCA 1/2 Neg Hx      Breast cancer Neg Hx      Cancer Neg Hx      Colon cancer Neg Hx      Endometrial cancer Neg Hx      Ovarian cancer Neg Hx      Social History     Social History     Marital status: Single     Spouse name: N/A     Number of children: N/A     Years of education: N/A     Social History Main Topics     Smoking status: Never Smoker     Smokeless tobacco: Never Used     Alcohol use Yes     Drug use: No     Sexual activity: Not on file     Other Topics Concern     Not on file     Social History Narrative       REVIEW OF SYSTEM:  Pertinent items are noted in HPI.    PHYSICAL EXAM:   /76  Pulse 61  Temp 97.7  F (36.5  C)  Resp 18  Wt 207 lb 6.4 oz (94.1 kg)  LMP 01/05/1993  SpO2 98%  BMI 37.33 kg/m2  General appearance: alert, appears stated age and cooperative  Head: Normocephalic, without obvious abnormality, atraumatic  Neck: no adenopathy, no carotid bruit, no JVD, supple, symmetrical, trachea midline and thyroid not enlarged, symmetric, no tenderness/mass/nodules, incisions bandaged posteriorly and anteriorly and are clean/dry/intact  Lungs: clear to auscultation bilaterally  Heart: regular rate and rhythm,  S1, S2 normal, no murmur, click, rub or gallop  Abdomen: soft, non-tender; bowel sounds normal; no masses,  no organomegaly  Extremities: extremities normal, atraumatic, no cyanosis or edema  Pulses: 2+ and symmetric  Skin: Skin color, texture, turgor normal. No rashes or lesions  Neurologic: Grossly normal      LABS:   None today.     ASSESSMENT:      ICD-10-CM    1. Physical deconditioning R53.81    2. Cervical Spine Stenosis M48.02    3. Brachial neuritis or radiculitis NOS M54.12        PLAN:    Physical Deconditioning due to Cervical Stenosis Decompression/Brachial Neuritis Decompression  -Continue PT/OT and other therapies as per care plan.  -Encouraged good nutrition and movement habits.   -Discussed care plan and expected course of stay.   -Continue to follow-up per routine schedule or sooner if needed.     Otherwise continue current care plan for all other chronic medical conditions, as they are stable. Encouraged patient to engage in healthy lifestyle behaviors such as engaging in social activities, exercising (PT/OT), eating well, and following care plan. Follow up for routine check-up, or sooner if needed. Will continue to monitor patient and work with nursing staff collaboratively to work toward positive patient outcomes.    Greater than 25 minutes spent with patient with at least 55% of this time spent on review of previous records, counseling, education, and discussion of the above care plan with nursing staff, and patient.     Electronically signed by: Celia Aburto CNP

## 2021-06-18 NOTE — PROGRESS NOTES
"[facility]    Facility:   [facility] [[id number]]   Code Status: FULL CODE      CHIEF COMPLAINT/REASON FOR VISIT:  Chief Complaint   Patient presents with     Review Of Multiple Medical Conditions     s/p cervical spinal stenosis repair, physical deconditioning       HISTORY:      HPI: Sridevi is a 52 y.o. female presenting to [facility] TCU for rehabilitation s/p having a surgical repair of cervical spinal stenosis. She was admitted to [facility] on 5/25/2018 and underwent an anterior fusion with corpectomy C3-4 and posterior fusion of C2-C6. She was discharged to [facility] on 5/30/2018. She has a complex past medical history outlined below in the PMH table. Her hospitalization has been summarized by the discharging physician:     \"HOSPITAL DISCHARGE SUMMARY    Patient Name: Sridevi Centeno  YOB: 1965 Age: 52 y.o.  Medical Record Number: [id number]  Primary Physician: DELVIS MCCLAIN MD  Phone: [phone]  Admission Date: 5/25/2018  Discharge Date: 5/30/2018    Patient will be discharged from [facility] to transitional care [facility] .    PRINCIPAL DISCHARGE DIAGNOSIS: SPINAL CORD COMPRESSION   Principal Problem:  Cervical spinal stenosis  Active Problems:  Essential hypertension  Prinzmetal's angina (HC)    BRIEF HOSPITAL COURSE: This 52 y.o. female was admitted for was elective spine surgery. Hospital course benign. Discharged in stable condition.    PROCEDURES PERFORMED DURING HOSPITALIZATION: Procedure(s):  ANTERIOR FUSION C-2 TO C-5 WITH CORPECTOMY C3-4, POSTERIOR FUSION C2-C6, PLACEMENT OF LUMBAR DRAIN (N/A)     COMPLICATIONS IN HOSPITAL: None\"    Today she is being evaluated for an intake examination to the TCU. She states she has been doing relatively well, however she is very sleepy right now due to just taking pain medications about 1 hour prior to visit. She will open her eyes and interact appropriately with caregivers " but quickly falls asleep and has to be re-awoken. She states that things are going fine. She has been going to PT/OT. She doesn't have any active concerns. She occasionally feels shortness of breath when working with PT/OT and exercising but states that is normal for her. She denies any other concerns including fevers/chills, cough or cold symptoms, headaches, vision changes, chest pain/pressure, difficulty breathing, SOB, abdominal pain, nausea, vomiting, diarrhea, dysuria, increasing weakness, increasing pain. Nursing staff have no concerns for her at this time.    Past Medical History:   Diagnosis Date     Arthritis      Chronic pain      Generalized anxiety disorder      Hypercholesteremia      Hypertension              Family History   Problem Relation Age of Onset     No Medical Problems Mother      No Medical Problems Father      No Medical Problems Sister      No Medical Problems Daughter      No Medical Problems Maternal Grandmother      No Medical Problems Maternal Grandfather      No Medical Problems Paternal Grandmother      No Medical Problems Paternal Grandfather      No Medical Problems Maternal Aunt      No Medical Problems Paternal Aunt      BRCA 1/2 Neg Hx      Breast cancer Neg Hx      Cancer Neg Hx      Colon cancer Neg Hx      Endometrial cancer Neg Hx      Ovarian cancer Neg Hx      Social History     Social History     Marital status: Single     Spouse name: N/A     Number of children: N/A     Years of education: N/A     Social History Main Topics     Smoking status: Never Smoker     Smokeless tobacco: Never Used     Alcohol use Yes     Drug use: No     Sexual activity: Not on file     Other Topics Concern     Not on file     Social History Narrative       REVIEW OF SYSTEM:  Pertinent items are noted in HPI.    PHYSICAL EXAM:   /73  Pulse 67  Temp 96.7  F (35.9  C)  Resp 18  Wt 206 lb 12.8 oz (93.8 kg)  LMP 01/05/1993  SpO2 96%  BMI 37.22 kg/m2  General appearance: alert, appears  stated age and cooperative  Head: Normocephalic, without obvious abnormality, atraumatic  Neck: no adenopathy, no carotid bruit, no JVD, supple, symmetrical, trachea midline and thyroid not enlarged, symmetric, no tenderness/mass/nodules, incisions bandaged posteriorly and anteriorly and are clean/dry/intact  Lungs: clear to auscultation bilaterally  Heart: regular rate and rhythm, S1, S2 normal, no murmur, click, rub or gallop  Abdomen: soft, non-tender; bowel sounds normal; no masses,  no organomegaly  Extremities: extremities normal, atraumatic, no cyanosis or edema  Pulses: 2+ and symmetric  Skin: Skin color, texture, turgor normal. No rashes or lesions  Neurologic: Grossly normal      LABS:   None today.     ASSESSMENT:      ICD-10-CM    1. Cervical Spine Stenosis M48.02    2. Physical deconditioning R53.81        PLAN:    Physical Deconditioning  -Continue PT/OT and other therapies as per care plan.  -Encouraged good nutrition and movement habits.   -Discussed care plan and expected course of stay.   -Continue to follow-up per routine schedule or sooner if needed.     Otherwise continue current care plan for all other chronic medical conditions, as they are stable. Encouraged patient to engage in healthy lifestyle behaviors such as engaging in social activities, exercising (PT/OT), eating well, and following care plan. Follow up for routine check-up, or sooner if needed. Will continue to monitor patient and work with nursing staff collaboratively to work toward positive patient outcomes.    Greater than 35 minutes spent with patient with at least 55% of this time spent on review of previous records, counseling, education, and discussion of the above care plan with nursing staff, and patient.     Electronically signed by: Celia Aburto CNP

## 2021-06-18 NOTE — PROGRESS NOTES
[facility]    Facility:   [facility] [[id number]]   Code Status: FULL CODE  PCP: Jaime Keane MD   Phone: [phone]   Fax: [phone]      CHIEF COMPLAINT/REASON FOR VISIT:  Chief Complaint   Patient presents with     Discharge Summary       HISTORY COURSE:  Sridevi is a 52 y.o. female who underwent cervical spinal fusion regarding cervical spinal stenosis. She has chronic pain in regards to this and follows with the pain clinic.  She also has hypertension.  When I asked her about Prinzmetal's angina, she was unaware of any heart problems.  She does have listed in her electronic medical record anxiety and depression and lumbar radiculopathy as well as chronic constipation and urge incontinence of urine and has had past surgical procedures of abdominal hysterectomy, and appendectomy.      Upon current review of systems she has had some steady improvement of her general condition.  However due to her underlying osteoarthritis of multiple joints, she still requires a walker with wheels up to 300 pounds and shower chair to assist with activities of daily living that would allow her to be more independent.      At the time of discharge t shower chair he plans would be for oxycodone 10 mg 1-2 every 4 hours as needed severe pain and then follow-up with her primary care physician as well as pain clinic.  30 pills will be given.  In regards to an episode of cloudy urine with some discomfort which was transient, UA and UC are obtained today and just encouraged to push liquids.  I advised against starting antibiotics for possible bladder infection until symptoms are more persistent or a culture result proves underlying UTI.  She is in agreement with this plan.    Review of Systems   All other systems reviewed and are negative.      Vitals:    06/08/18 0948   BP: 102/68   Pulse: 66   Resp: 18   Temp: 97.6  F (36.4  C)   SpO2: 92%   Weight: 214 lb 6.4 oz (97.3 kg)       Physical Exam    Constitutional: No distress.   HENT:   Mouth/Throat: Oropharynx is clear and moist.   Eyes: Right eye exhibits no discharge. Left eye exhibits no discharge.   Cardiovascular: Normal heart sounds.    Pulmonary/Chest: Breath sounds normal. No respiratory distress.   Abdominal: Bowel sounds are normal. There is no tenderness.   Neurological: She is alert.   Skin: Skin is warm and dry.   Psychiatric: She has a normal mood and affect.   Nursing note and vitals reviewed.      MEDICATION LIST:  Current Outpatient Prescriptions   Medication Sig     acetaminophen (TYLENOL) 325 MG tablet Take 325-650 mg by mouth every 6 (six) hours as needed for pain.     amLODIPine (NORVASC) 10 MG tablet Take 10 mg by mouth daily.     aspirin 325 MG tablet Take 325 mg by mouth daily.     aspirin-acetaminophen-caffeine (EXCEDRIN MIGRAINE) 250-250-65 mg per tablet Take 2 tablets by mouth 2 (two) times a day as needed for pain.     gabapentin (NEURONTIN) 300 MG capsule Take 600 mg by mouth 2 (two) times a day.     gabapentin (NEURONTIN) 300 MG capsule Take 900 mg by mouth at bedtime.     hydroCHLOROthiazide (HYDRODIURIL) 25 MG tablet Take 25 mg by mouth daily.     lisinopril (PRINIVIL,ZESTRIL) 2.5 MG tablet Take 2.5 mg by mouth daily.     methocarbamol (ROBAXIN) 750 MG tablet Take 750 mg by mouth 3 (three) times a day.     nitroglycerin (NITROSTAT) 0.4 MG SL tablet Place 0.4 mg under the tongue every 5 (five) minutes as needed for chest pain.     oxyCODONE (ROXICODONE) 10 mg immediate release tablet Take 10 mg by mouth every 4 (four) hours as needed for pain.     oxyCODONE (ROXICODONE) 10 mg immediate release tablet Take 20 mg by mouth every 4 (four) hours as needed for pain.     polyethylene glycol (MIRALAX) 17 gram packet Take 17 g by mouth daily.     senna-docusate (SENNOSIDES-DOCUSATE SODIUM) 8.6-50 mg tablet Take 1-4 tablets by mouth 2 (two) times a day.       DISCHARGE DIAGNOSIS:    ICD-10-CM    1. S/P cervical spinal fusion Z98.1    2.  Cervical Spine Stenosis M48.02    3. Other chronic pain G89.29    4. Generalized osteoarthrosis, involving multiple sites M15.9        MEDICAL EQUIPMENT NEEDS:  A shower chair is indicated for multiple joints involved with osteoarthritis and this will be ongoing for 99 months, and electronic.  Also walker with wheels up to 300 pounds is needed for the same problem.    DISCHARGE PLAN/FACE TO FACE:  I certify that services are/were furnished while this patient was under the care of a physician and that a physician or an allowed non-physician practitioner (NPP), had a face-to-face encounter that meets the physician face-to-face encounter requirements. The encounter was in whole, or in part, related to the primary reason for home health. The patient is confined to his/her home and needs intermittent skilled nursing, physical therapy, speech-language pathology, or the continued need for occupational therapy. A plan of care has been established by a physician and is periodically reviewed by a physician.  Date of Face-to-Face Encounter: 6/8/18    I certify that, based on my findings, the following services are medically necessary home health services: Home physical therapy, home occupational therapy, and skilled nursing.    My clinical findings support the need for the above skilled services because: (Please write a brief narrative summary that describes what the RN, PT, SLP, or other services will be doing in the home. A list of diagnoses in this section does not meet the CMS requirements.)  Skilled nursing visits are for medication set up and teaching, symptom and disease process monitoring and education.  Home physical therapy is to evaluate and treat for strengthening, balance, endurance, and safety with mobility and ambulation.  Home occupational therapy is to evaluate and treat for strengthening, ADL needs, adaptive equipment, and safety.    This patient is homebound because: (Please write a brief narrative summary  describing the functional limitations as to why this patient is homebound and specifically what makes this patient homebound.)  Due to her underlying chronic pain from several different reasons including osteoarthritis of multiple joints, and now most recently her cervical fusion regarding severe spinal stenosis, she is limited in her activities of daily living and endurance and strength.  She requires a PCA.    The patient is, or has been, under my care and I have initiated the establishment of the plan of care. This patient will be followed by a physician who will periodically review the plan of care.    Schedule follow up visit with primary care provider within 7 days to reestablish care.    Electronically signed by: Ray Yoder MD

## 2021-06-18 NOTE — PROGRESS NOTES
[facility] PRE-OPERATIVE VISIT FOR:    Sridevi Centeno,  1965, MRN [id number]    Upcoming surgery date:   Surgeon: Truong Jackson Facility: [facility]    Chief Complaint: Preop anterior/posterior neck fusion    PCP: Jaime Keane MD, [phone]    SUBJECTIVE:  History of Present Illness:   Sridevi Centeno is a 52 y.o. female with history of chronic neck pain, previous surgical fusion  Ongoing symptoms, apparently imaging that shows cervical spinal stenosis    History of hypertension, treated with amlodipine 2.5 mg and lisinopril 20.  Previously on hydrochlorothiazide but caused persistent/recurrent hypokalemia.  This was stopped a couple of months ago.    Patient also has surgical menopause, recently, estradiol was stopped, though it remains listed below.    Patient  Past Medical History:  Patient Active Problem List   Diagnosis     Hypokalemia     Hematuria     Urge Incontinence Of Urine     Chronic Constipation     Cervical Spine Stenosis     Generalized anxiety disorder     Patellofemoral Syndrome Of The Left Knee     Hypercholesteremia     Major Depression, Recurrent     Hypertension     Generalized Osteoarthritis     Cervical Disc Degeneration     Lumbar Radiculopathy     Bladder wall thickening     Hydronephrosis, right     Constipation     Hypomagnesemia     Brachial neuritis or radiculitis NOS     Health care maintenance     Chronic pain     Osteoarthritis of knees, bilateral     Edema     Surgical menopause     Healthcare maintenance     Past Surgical History:   Procedure Laterality Date     HYSTERECTOMY       OOPHORECTOMY Bilateral      SC APPENDECTOMY      Description: Appendectomy;  Recorded: 2011;     SC ARTHRODESIS ANT INTERBODY MIN DISCECTOMY, CERVICAL BELOW C2      Description: Cervical Vertebral Fusion;  Recorded: 2011;     SC TOTAL ABDOM HYSTERECTOMY      Description: Total Abdominal Hysterectomy;  Proc Date: 1993;     Any history of anesthesia  reaction no  Do you have difficulty breathing or chest pain after walking up a flight of stairs: No  History of obstructive sleep apnea: No  Steroid use in the last 6 months: No  Frequent Aspirin/NSAID use: No  Prior Blood Transfusion: No  Prior Blood Transfusion Reaction: No  If for some reason prior to, during or after the procedure, if it is medically indicated, would you be willing to have a blood transfusion?:  There is no transfusion refusal.    Social History:  She is single  she  reports that she has never smoked. She has never used smokeless tobacco. She reports that she drinks alcohol. She reports that she does not use illicit drugs.    Family History:  Family History   Problem Relation Age of Onset     No Medical Problems Mother      No Medical Problems Father      No Medical Problems Sister      No Medical Problems Daughter      No Medical Problems Maternal Grandmother      No Medical Problems Maternal Grandfather      No Medical Problems Paternal Grandmother      No Medical Problems Paternal Grandfather      No Medical Problems Maternal Aunt      No Medical Problems Paternal Aunt      BRCA 1/2 Neg Hx      Breast cancer Neg Hx      Cancer Neg Hx      Colon cancer Neg Hx      Endometrial cancer Neg Hx      Ovarian cancer Neg Hx        Current Medications:  Amlodipine 10 mg p.o. daily, lisinopril 2.5 mg p.o. daily, MiraLAX 1 capful daily.  She is followed by pain clinic and receives oxycodone immediate release 10 mg tablets (by her account) -up to 5 tablets per day  And has been taking Excedrin occasionally.  Instructed to stop at this point  Allergies:  Hydrocodone-acetaminophen and Ibuprofen    Review or Systems:  Constitution: normal  HEENT: normal  Pulmonary: normal  Cardiovascular: normal  GI: normal  : normal  Musculoskeletal: normal  Neuro: normal  Endocrine: normal  Psych: normal  Lymph/Heme: normal    OBJECTIVE:  Vitals:    05/24/18 1028   BP: 98/66   Pulse: 64   Resp: 16   Temp: 97.4  F (36.3   C)   SpO2: 97%     General Appearance:    Alert, cooperative, no distress, appears stated age   Head:    Normocephalic, without obvious abnormality, atraumatic   Eyes:    PERRL, conjunctiva/corneas clear, EOM's intact   Nose:   Mucosa moist, normal    Throat:   Lips, mucosa, and tongue normal; ora phaynx clear   Neck:   Supple, symmetrical, trachea midline, no adenopathy;     thyroid:  no enlargement/tenderness/nodules; no carotid    bruit or JVD   Lungs:     Clear to auscultation bilaterally, respirations unlabored    Heart:    Regular rate and rhythm, S1 and S2 normal, no murmur, rub   or gallop   Abdomen:     Soft, non-tender, bowel sounds active all four quadrants,     no masses, no organomegaly   Extremities:   Extremities normal, atraumatic, no cyanosis 1+ lower extremity edema   Pulses:   2+ and symmetric all extremities   Skin:   Skin color, texture, turgor normal, no rashes or lesions   Neurologic:   No focal deficits         Labs:  Results for orders placed or performed in visit on 05/24/18   Electrocardiogram Perform and Read   Result Value Ref Range    SYSTOLIC BLOOD PRESSURE  mmHg    DIASTOLIC BLOOD PRESSURE  mmHg    VENTRICULAR RATE 57 BPM    ATRIAL RATE 57 BPM    P-R INTERVAL 176 ms    QRS DURATION 80 ms    Q-T INTERVAL 482 ms    QTC CALCULATION (BEZET) 469 ms    P Axis 150 degrees    R AXIS -25 degrees    T AXIS -39 degrees    MUSE DIAGNOSIS       Unusual P axis, possible ectopic atrial bradycardia  T wave abnormality, consider lateral ischemia  Prolonged QT  Abnormal ECG  When compared with ECG of 14-JUN-2017 11:12,  Ectopic atrial rhythm has replaced Sinus rhythm  Nonspecific T wave abnormality, improved in Anterior leads      I believe this is sinus bradycardia, otherwise, agree.  Compared with EKG 6/14/17, T-wave changes are essentially unchanged.  Hemoglobin and potassium are pending  ASSESSMENT/PLAN:  1. Pre-op examination  Electrocardiogram Perform and Read     Acceptable presurgical  candidate.  Good functional capacity  Recommend lisinopril and amlodipine alone on the morning of surgery  We will forward hemoglobin and potassium results as soon as they become available      Jaime Keane MD

## 2021-06-19 NOTE — LETTER
Letter by Jaime Keane MD at      Author: Jaime Keane MD Service: -- Author Type: --    Filed:  Encounter Date: 11/1/2019 Status: Signed         11/1/2019  Sridevi Centeno      To Whom It May Concern,    Sridevi Centeno has my patient for many years.  She has a history of depression and should be allowed to have an animal in her apartment for reasons of emotional support.      Feel free to contact me with questions.    Sincerely,          Jaime Keane MD

## 2021-06-21 NOTE — LETTER
Letter by Jaime Keane MD at      Author: Jaime Keane MD Service: -- Author Type: --    Filed:  Encounter Date: 12/1/2020 Status: (Other)       12/1/2020  Sridevi Centeno      To Whom It May Concern,    Sridevi Centeno has been a patient of mine for many years.  She has a history of anxiety and depression and should be allowed to keep a  pet with her in her residence.  This greatly benefits her wellbeing and is medically necessary.      Feel free to contact me with questions.    Sincerely,          Jaime Keane MD

## 2021-06-23 NOTE — TELEPHONE ENCOUNTER
I cannot get those results without her signing a release of information.  She should get it directly from the location that performed it.  Usually breast centers will give out results themselves

## 2021-06-23 NOTE — TELEPHONE ENCOUNTER
Test Results  Who is calling?:  Patient   Who ordered the test:     Jersey Padilla PA         Type of test: Imaging mammogram  Date of test:  1/22/19  Where was the test performed:  [facility]  What are your questions/concerns?:  I would like these results  Okay to leave a detailed message?:  Yes     ,DirectAddress_Unknown

## 2021-06-30 NOTE — PROGRESS NOTES
Progress Notes by Jaime Keane MD at 12/1/2020  4:40 PM     Author: Jaime Keane MD Service: -- Author Type: Physician    Filed: 12/1/2020  6:45 PM Encounter Date: 12/1/2020 Status: Signed    : Jaime Keane MD (Physician)       Assessment/ Plan    Problem List Items Addressed This Visit        High    Patellofemoral Syndrome Of The Left Knee - Primary     With osteoarthritis of the left knee.  Refer for physical therapy, refer for bariatric clinic, Celebrex was effective for her and now that blood pressure is not an interfering issue and normal renal function, will prescribe that for pain.  Patient also on opiate contract through pain clinic.         Relevant Medications    celecoxib (CELEBREX) 100 MG capsule    Other Relevant Orders    Ambulatory referral to PT/OT    Hypercholesteremia     No meds not fasting, check total cholesterol and HDL         Relevant Orders    Cholesterol, Total    HDL Cholesterol    Major Depression, Recurrent     Not focus of discussion today, reports doing fairly well considering Covid.  PHQ 9= 6    5 to 9: mild   10 to 14: moderate   15 to 19: moderately severe   ?20: severe     Taking duloxetine 30  No changes         Hypertension     Pressure mysteriously excellent today.  Not on any medication for some time since she ran out.  Will hold off for now.         Relevant Medications    blood pressure monitor Kit    Other Relevant Orders    Comprehensive Metabolic Panel    Hemoglobin (Completed)       Medium    Hypokalemia     No longer on hydrochlorothiazide, check labs            Unprioritized    Healthcare maintenance     Discussed Shingrix, referred for colonoscopy         Relevant Orders    Hepatitis C Antibody (Anti-HCV)    Ambulatory referral for Colonoscopy    Morbid obesity (H)     Frustrated, no weight loss on an intermittent fasting diet and exercising regularly.  Discussed referral to bariatric clinic.  Agreeable.             Other Visit  Diagnoses     Class 3 severe obesity with serious comorbidity and body mass index (BMI) of 40.0 to 44.9 in adult, unspecified obesity type (H)        Relevant Orders    Ambulatory referral to Bariatric Care: Surgical and Non-Surgical          Subjective  CC:  Chief Complaint   Patient presents with   ? Medication Refill     HPI:  Left knee Pain    Duration/ timing of onset: Got worse about 2 weeks ago, suddenly  Location of pain anterior, medial  Severity/ Quality: Sharp  Modifying factors: Make it worse-weightbearing and movement   make it better-Celebrex used-used sisters vious work-up/ treatment?  Yes, physical therapy  Clicking or popping?  No  Swelling?  Yes, marked swelling, gradually going down  Comments patient on opiate contract for chronic pain.    Briefly discussed depression, see above.    Needs note for  animal in new apartment.  PFSH:  Patient Active Problem List   Diagnosis   ? Hypokalemia   ? Hematuria   ? Urge Incontinence Of Urine   ? Chronic Constipation   ? Cervical Spine Stenosis   ? Generalized anxiety disorder   ? Patellofemoral Syndrome Of The Left Knee   ? Hypercholesteremia   ? Major Depression, Recurrent   ? Hypertension   ? Generalized Osteoarthritis   ? Cervical Disc Degeneration   ? Lumbar Radiculopathy   ? Bladder wall thickening   ? Hydronephrosis, right   ? Constipation   ? Hypomagnesemia   ? Brachial neuritis or radiculitis NOS   ? Health care maintenance   ? Chronic pain   ? Osteoarthritis of knees, bilateral   ? Edema   ? Surgical menopause   ? Healthcare maintenance   ? Physical deconditioning   ? Chronic patellofemoral pain of both knees   ? Morbid obesity (H)     Current medications reviewed as follows:  Current Outpatient Medications on File Prior to Visit   Medication Sig   ? acetaminophen (TYLENOL) 325 MG tablet Take 325-650 mg by mouth every 6 (six) hours as needed for pain.   ? amLODIPine (NORVASC) 10 MG tablet Take 10 mg by mouth daily.   ? aspirin 325 MG tablet  Take 325 mg by mouth daily.   ? DULoxetine (CYMBALTA) 30 MG capsule Take 1 capsule (30 mg total) by mouth daily.   ? hydroCHLOROthiazide (HYDRODIURIL) 25 MG tablet Take 25 mg by mouth daily.   ? lisinopril (PRINIVIL,ZESTRIL) 2.5 MG tablet Take 2.5 mg by mouth daily.   ? nitroglycerin (NITROSTAT) 0.4 MG SL tablet Place 0.4 mg under the tongue every 5 (five) minutes as needed for chest pain.   ? oxyCODONE (ROXICODONE) 10 mg immediate release tablet Take 1-2 tablets (10-20 mg total) by mouth every 4 (four) hours as needed for pain (10mg for pain 4-6, 20mg for pain 7-10).   ? polyethylene glycol (MIRALAX) 17 gram packet Take 17 g by mouth daily.   ? pregabalin (LYRICA) 200 MG capsule    ? senna-docusate (SENNOSIDES-DOCUSATE SODIUM) 8.6-50 mg tablet Take 1-4 tablets by mouth 2 (two) times a day.   ? aspirin-acetaminophen-caffeine (EXCEDRIN MIGRAINE) 250-250-65 mg per tablet Take 2 tablets by mouth 2 (two) times a day as needed for pain.   ? [DISCONTINUED] gabapentin (NEURONTIN) 300 MG capsule Take 600 mg by mouth 2 (two) times a day.   ? [DISCONTINUED] gabapentin (NEURONTIN) 300 MG capsule Take 900 mg by mouth at bedtime.   ? [DISCONTINUED] methocarbamol (ROBAXIN) 750 MG tablet Take 750 mg by mouth 3 (three) times a day.     No current facility-administered medications on file prior to visit.      Social History     Tobacco Use   Smoking Status Former Smoker   Smokeless Tobacco Never Used     Social History     Social History Narrative   ? Not on file     Patient Care Team:  Jaime Keane MD as PCP - General  Jaime Keane MD as Assigned PCP  ROS  Full 10 system review including constitutional, respiratory, cardiac, gi, urinary, rheumatologic, neurologic, reproductive, dermatologic psychiatric is  performed  and is otherwise negative         Objective  Physical Exam  Vitals:    12/01/20 1641   BP: 100/66   Patient Site: Left Arm   Patient Position: Sitting   Cuff Size: Adult Large   Pulse: 64   Resp: 20  "  Temp: 97.6  F (36.4  C)   TempSrc: Temporal   Weight: (!) 229 lb (103.9 kg)   Height: 5' 2\" (1.575 m)     Body mass index is 41.88 kg/m .  Left knee is swollen, exquisite tenderness on patella and medial joint line, minimal pain with maximal flexion, no pain over past anserine region.  1+ lower extremity edema.  Diagnostics:   Labs are pending      Please note: Voice recognition software was used in this dictation.  It may therefore contain typographical errors.           "

## 2021-07-03 NOTE — ADDENDUM NOTE
Addendum Note by Jaime Keane MD at 12/1/2020  4:40 PM     Author: Jaime Keane MD Service: -- Author Type: Physician    Filed: 12/2/2020  1:17 PM Encounter Date: 12/1/2020 Status: Signed    : Jaime Keane MD (Physician)    Addended by: JAIME KEANE on: 12/2/2020 01:17 PM        Modules accepted: Orders

## 2021-09-02 ENCOUNTER — NURSE TRIAGE (OUTPATIENT)
Dept: NURSING | Facility: CLINIC | Age: 56
End: 2021-09-02

## 2021-09-02 NOTE — TELEPHONE ENCOUNTER
Patient that she stood up from table and became dizzy and ended up hitting head at the corner of the tablet on Friday August 27th  Yesterday legs went numb and then fell in the bathtub.  Patient protected head.  Today the knee left cracked and it is swollen and painful.  Patient heard a pop with her knee.  Patient denies fever cough and shortness of breath.  Patient is requesting an in person clinic visit.    COVID 19 Nurse Triage Plan/Patient Instructions    Please be aware that novel coronavirus (COVID-19) may be circulating in the community. If you develop symptoms such as fever, cough, or SOB or if you have concerns about the presence of another infection including coronavirus (COVID-19), please contact your health care provider or visit [email or web address].     Disposition/Instructions    In-Person Visit with provider recommended. Reference Visit Selection Guide.    Thank you for taking steps to prevent the spread of this virus.  o Limit your contact with others.  o Wear a simple mask to cover your cough.  o Wash your hands well and often.    Resources    [facility]: About COVID-19: [email or web address]    CDC: What to Do If You're Sick: www.cdc.gov/coronavirus/2019-ncov/about/steps-when-sick.html    CDC: Ending Home Isolation: www.cdc.gov/coronavirus/2019-ncov/hcp/disposition-in-home-patients.html     CDC: Caring for Someone: www.cdc.gov/coronavirus/2019-ncov/if-you-are-sick/care-for-someone.html     [facility]: Interim Guidance for Hospital Discharge to Home: www.health.[facility].mn.us/diseases/coronavirus/hcp/hospdischarge.pdf    [facility] clinical trials (COVID-19 research studies): [email or web address]     Below are the COVID-19 hotlines at the [place] Department of Health ([facility]). Interpreters are available.   o For health questions: Call 340-141-9938 or 1-143.365.8632 (7 a.m. to 7 p.m.)  o For questions about schools and childcare: Call 860-464-1680  or 1-937.252.6775 (7 a.m. to 7 p.m.)                       Reason for Disposition    A 'snap' or 'pop' was heard at the time of injury    Additional Information    Negative: Major bleeding (actively dripping or spurting) that can't be stopped    Negative: Bullet, stabbed by knife or other serious penetrating wound    Negative: Looks like a dislocated joint (crooked or deformed)    Negative: Serious injury with multiple fractures (broken bones)    Negative: Sounds like a life-threatening emergency to the triager    Negative: Can't stand (bear weight) or walk    Negative: Skin is split open or gaping (length > 1/2 inch or 12 mm)    Negative: Bleeding won't stop after 10 minutes of direct pressure (using correct technique)    Negative: Dirt in the wound and not removed after 15 minutes of scrubbing    Negative: Sounds like a serious injury to the triager    Negative: Looks infected (e.g., spreading redness, pus, red streak)    Negative: SEVERE pain (e.g., excruciating)    Negative: No prior tetanus shots (or is not fully vaccinated) and any wound (e.g., cut or scrape)    Negative: HIV positive or severe immunodeficiency (severely weak immune system) and DIRTY cut or scrape    Negative: Patient wants to be seen    Negative: Injury interferes with work or school    Protocols used: KNEE INJURY-A-[place]

## 2021-09-03 ENCOUNTER — OFFICE VISIT (OUTPATIENT)
Dept: FAMILY MEDICINE | Facility: CLINIC | Age: 56
End: 2021-09-03
Payer: COMMERCIAL

## 2021-09-03 VITALS
BODY MASS INDEX: 45.08 KG/M2 | HEIGHT: 62 IN | TEMPERATURE: 98.6 F | SYSTOLIC BLOOD PRESSURE: 120 MMHG | RESPIRATION RATE: 24 BRPM | DIASTOLIC BLOOD PRESSURE: 87 MMHG | HEART RATE: 82 BPM | WEIGHT: 245 LBS

## 2021-09-03 DIAGNOSIS — M25.562 CHRONIC PATELLOFEMORAL PAIN OF BOTH KNEES: ICD-10-CM

## 2021-09-03 DIAGNOSIS — E66.01 MORBID OBESITY (H): ICD-10-CM

## 2021-09-03 DIAGNOSIS — R60.0 BILATERAL LOWER EXTREMITY EDEMA: Primary | ICD-10-CM

## 2021-09-03 DIAGNOSIS — G89.29 CHRONIC PATELLOFEMORAL PAIN OF BOTH KNEES: ICD-10-CM

## 2021-09-03 DIAGNOSIS — R06.83 SNORING: ICD-10-CM

## 2021-09-03 DIAGNOSIS — M25.561 CHRONIC PATELLOFEMORAL PAIN OF BOTH KNEES: ICD-10-CM

## 2021-09-03 LAB
ALBUMIN SERPL-MCNC: 3.8 G/DL (ref 3.5–5)
ALBUMIN UR-MCNC: NEGATIVE MG/DL
ALP SERPL-CCNC: 80 U/L (ref 45–120)
ALT SERPL W P-5'-P-CCNC: 11 U/L (ref 0–45)
ANION GAP SERPL CALCULATED.3IONS-SCNC: 12 MMOL/L (ref 5–18)
APPEARANCE UR: CLEAR
AST SERPL W P-5'-P-CCNC: 19 U/L (ref 0–40)
BACTERIA #/AREA URNS HPF: ABNORMAL /HPF
BILIRUB SERPL-MCNC: 0.2 MG/DL (ref 0–1)
BILIRUB UR QL STRIP: NEGATIVE
BUN SERPL-MCNC: 14 MG/DL (ref 8–22)
CALCIUM SERPL-MCNC: 9.5 MG/DL (ref 8.5–10.5)
CHLORIDE BLD-SCNC: 104 MMOL/L (ref 98–107)
CO2 SERPL-SCNC: 26 MMOL/L (ref 22–31)
COLOR UR AUTO: YELLOW
CREAT SERPL-MCNC: 0.72 MG/DL (ref 0.6–1.1)
GFR SERPL CREATININE-BSD FRML MDRD: >90 ML/MIN/1.73M2
GLUCOSE BLD-MCNC: 73 MG/DL (ref 70–125)
GLUCOSE UR STRIP-MCNC: NEGATIVE MG/DL
HGB UR QL STRIP: ABNORMAL
KETONES UR STRIP-MCNC: NEGATIVE MG/DL
LEUKOCYTE ESTERASE UR QL STRIP: NEGATIVE
NITRATE UR QL: NEGATIVE
PH UR STRIP: 7 [PH] (ref 5–8)
POTASSIUM BLD-SCNC: 3.9 MMOL/L (ref 3.5–5)
PROT SERPL-MCNC: 7.5 G/DL (ref 6–8)
RBC #/AREA URNS AUTO: ABNORMAL /HPF
SODIUM SERPL-SCNC: 142 MMOL/L (ref 136–145)
SP GR UR STRIP: 1.02 (ref 1–1.03)
SQUAMOUS #/AREA URNS AUTO: ABNORMAL /LPF
TSH SERPL DL<=0.005 MIU/L-ACNC: 1.01 UIU/ML (ref 0.3–5)
UROBILINOGEN UR STRIP-ACNC: 0.2 E.U./DL
WBC #/AREA URNS AUTO: ABNORMAL /HPF

## 2021-09-03 PROCEDURE — 84443 ASSAY THYROID STIM HORMONE: CPT | Performed by: FAMILY MEDICINE

## 2021-09-03 PROCEDURE — 81001 URINALYSIS AUTO W/SCOPE: CPT | Performed by: FAMILY MEDICINE

## 2021-09-03 PROCEDURE — 99214 OFFICE O/P EST MOD 30 MIN: CPT | Performed by: FAMILY MEDICINE

## 2021-09-03 PROCEDURE — 80053 COMPREHEN METABOLIC PANEL: CPT | Performed by: FAMILY MEDICINE

## 2021-09-03 PROCEDURE — 36415 COLL VENOUS BLD VENIPUNCTURE: CPT | Performed by: FAMILY MEDICINE

## 2021-09-03 RX ORDER — HYDROCHLOROTHIAZIDE 25 MG/1
25 TABLET ORAL DAILY
Qty: 30 TABLET | Refills: 3 | Status: SHIPPED | OUTPATIENT
Start: 2021-09-03 | End: 2022-01-12

## 2021-09-03 ASSESSMENT — PATIENT HEALTH QUESTIONNAIRE - PHQ9: SUM OF ALL RESPONSES TO PHQ QUESTIONS 1-9: 4

## 2021-09-03 ASSESSMENT — MIFFLIN-ST. JEOR: SCORE: 1649.56

## 2021-09-03 NOTE — ASSESSMENT & PLAN NOTE
Discussed options and will refer again to weight management program/bariatrics, interested in surgical approach  
Greater on left than right.  Most likely multifactorial etiology and involving  obesity, use of Lyrica, and patient does have some varicose veins.    Rule out sleep apnea  Asymmetric, rule out left DVT, ultrasound ordered  Compress, wrapped with Ace wraps today. Ordered compression stockings  Restart hydrochlorothiazide  Check labs including UA and CMP  Follow-up 3 to 4 weeks  
This was not fully assessed today because of the time considerations but patient states that she is having sensation of patellar instability and sometimes her knee gives out. I plan to discuss this further with her upon follow-up in 3 to 4 weeks    
With lower extremity edema and obesity, rule out sleep apnea. Refer to sleep medicine  
16-Feb-2020 22:21

## 2021-09-03 NOTE — PROGRESS NOTES
Assessment/ Plan  Chronic patellofemoral pain of both knees  This was not fully assessed today because of the time considerations but patient states that she is having sensation of patellar instability and sometimes her knee gives out. I plan to discuss this further with her upon follow-up in 3 to 4 weeks      Morbid obesity (H)  Discussed options and will refer again to weight management program/bariatrics, interested in surgical approach    Bilateral lower extremity edema  Greater on left than right.  Most likely multifactorial etiology and involving  obesity, use of Lyrica, and patient does have some varicose veins.    Rule out sleep apnea  Asymmetric, rule out left DVT, ultrasound ordered  Compress, wrapped with Ace wraps today. Ordered compression stockings  Restart hydrochlorothiazide  Check labs including UA and CMP  Follow-up 3 to 4 weeks    Snoring  With lower extremity edema and obesity, rule out sleep apnea. Refer to sleep medicine     Subjective  CC:  chief complaint  HPI:  56-year-old  Chief complaint is months of swelling in her lower extremities, left greater than right, perhaps 3 months  Came on fairly suddenly  Left leg is much more swollen than the right  Also is painful  Denies chest pain, shortness of breath, orthopnea, diaphoresis  Has been on gabapentin, more recently Lyrica for a long time  Not taking amlodipine anymore    Complains of nocturia    PFSH:  Patient Active Problem List   Diagnosis     Hypokalemia     Hematuria     Urge Incontinence Of Urine     Chronic Constipation     Cervical Spine Stenosis     Generalized anxiety disorder     Patellofemoral Syndrome Of The Left Knee     Hypercholesteremia     Major Depression, Recurrent     Hypertension     Generalized Osteoarthritis     Cervical Disc Degeneration     Lumbar Radiculopathy     Bladder wall thickening     Hydronephrosis, right     Hypomagnesemia     Brachial neuritis or radiculitis NOS     Health care maintenance     Chronic pain  "    Osteoarthritis of knees, bilateral     Edema     Surgical menopause     Healthcare maintenance     Physical deconditioning     Chronic patellofemoral pain of both knees     Morbid obesity (H)     Impairment of balance     Prinzmetal's angina (H)     Psychalgia     Bilateral lower extremity edema     Snoring     aspirin (ASA) 325 MG tablet, Take 325 mg by mouth  oxyCODONE IR (ROXICODONE) 10 MG tablet,   Pregabalin (LYRICA) 200 MG capsule,   tiZANidine (ZANAFLEX) 4 MG tablet, TK 1 TO 2 TS PO Q 8 H PRF MUSCLE SPASMS  gabapentin enacarbil (HORIZANT) 600 MG tablet, TK 1 T PO BID (Patient not taking: Reported on 9/3/2021)    No current facility-administered medications on file prior to visit.       History   Smoking Status     Former Smoker   Smokeless Tobacco     Never Used     Social History     Social History Narrative     Not on file     Patient Care Team:  Clinic, [facility] as PCP - Jaime Mcnally MD as Assigned PCP  ROS  As above      Objective  Physical Exam  Vitals:    09/03/21 1459   BP: 120/87   BP Location: Right arm   Patient Position: Dangled   Cuff Size: Adult Regular   Pulse: 82   Resp: 24   Temp: 98.6  F (37  C)   TempSrc: Temporal   Weight: 111.1 kg (245 lb)   Height: 1.575 m (5' 2\")     Body mass index is 44.81 kg/m .  2+, slightly pitting, bilateral lower extremity edema, diameter of left calf is much larger than right  No changes in skin of venous stasis though she does have some scattered varicose veins  Tenderness on palpation of posterior calf.  Negative Homans  No elevated JVP.  Cardiac exam without murmurs, gallops, rubs.  Regular.  Abdomen is obese but nondistended    Diagnostics:   Results for orders placed or performed in visit on 09/03/21   UA with Microscopic reflex to Culture - Clinic Collect     Status: Abnormal    Specimen: Urine, Midstream   Result Value Ref Range    Color Urine Yellow Colorless, Straw, Light Yellow, Yellow    Appearance Urine Clear Clear    " Glucose Urine Negative Negative mg/dL    Bilirubin Urine Negative Negative    Ketones Urine Negative Negative mg/dL    Specific Gravity Urine 1.025 1.005 - 1.030    Blood Urine Trace (A) Negative    pH Urine 7.0 5.0 - 8.0    Protein Albumin Urine Negative Negative mg/dL    Urobilinogen Urine 0.2 0.2, 1.0 E.U./dL    Nitrite Urine Negative Negative    Leukocyte Esterase Urine Negative Negative   Urine Microscopic     Status: Abnormal   Result Value Ref Range    Bacteria Urine Few (A) None Seen /HPF    RBC Urine 0-2 0-2 /HPF /HPF    WBC Urine 0-5 0-5 /HPF /HPF    Squamous Epithelials Urine Few (A) None Seen /LPF    Narrative    Urine Culture not indicated           Please note: Voice recognition software was used in this dictation.  It may therefore contain typographical errors.

## 2021-09-03 NOTE — LETTER
September 7, 2021      Sridevi Centeno  [address]   [place] [address]        Dear ,    We are writing to inform you of your test results.    Sridevi, your recent test results were okay.        Resulted Orders   UA with Microscopic reflex to Culture - Clinic Collect   Result Value Ref Range    Color Urine Yellow Colorless, Straw, Light Yellow, Yellow    Appearance Urine Clear Clear    Glucose Urine Negative Negative mg/dL    Bilirubin Urine Negative Negative    Ketones Urine Negative Negative mg/dL    Specific Gravity Urine 1.025 1.005 - 1.030    Blood Urine Trace (A) Negative    pH Urine 7.0 5.0 - 8.0    Protein Albumin Urine Negative Negative mg/dL    Urobilinogen Urine 0.2 0.2, 1.0 E.U./dL    Nitrite Urine Negative Negative    Leukocyte Esterase Urine Negative Negative   Comprehensive metabolic panel (BMP + Alb, Alk Phos, ALT, AST, Total. Bili, TP)   Result Value Ref Range    Sodium 142 136 - 145 mmol/L    Potassium 3.9 3.5 - 5.0 mmol/L    Chloride 104 98 - 107 mmol/L    Carbon Dioxide (CO2) 26 22 - 31 mmol/L    Anion Gap 12 5 - 18 mmol/L    Urea Nitrogen 14 8 - 22 mg/dL    Creatinine 0.72 0.60 - 1.10 mg/dL    Calcium 9.5 8.5 - 10.5 mg/dL    Glucose 73 70 - 125 mg/dL    Alkaline Phosphatase 80 45 - 120 U/L    AST 19 0 - 40 U/L    ALT 11 0 - 45 U/L    Protein Total 7.5 6.0 - 8.0 g/dL    Albumin 3.8 3.5 - 5.0 g/dL    Bilirubin Total 0.2 0.0 - 1.0 mg/dL    GFR Estimate >90 >60 mL/min/1.73m2      Comment:      As of July 11, 2021, eGFR is calculated by the CKD-EPI creatinine equation, without race adjustment. eGFR can be influenced by muscle mass, exercise, and diet. The reported eGFR is an estimation only and is only applicable if the renal function is stable.   TSH   Result Value Ref Range    TSH 1.01 0.30 - 5.00 uIU/mL   Urine Microscopic   Result Value Ref Range    Bacteria Urine Few (A) None Seen /HPF    RBC Urine 0-2 0-2 /HPF /HPF    WBC Urine 0-5 0-5 /HPF /HPF    Squamous  Epithelials Urine Few (A) None Seen /LPF    Narrative    Urine Culture not indicated       If you have any questions or concerns, please call the clinic at the number listed above.       Sincerely,      Jaime Keane MD

## 2022-01-12 ENCOUNTER — OFFICE VISIT (OUTPATIENT)
Dept: FAMILY MEDICINE | Facility: CLINIC | Age: 57
End: 2022-01-12
Payer: COMMERCIAL

## 2022-01-12 VITALS
SYSTOLIC BLOOD PRESSURE: 138 MMHG | RESPIRATION RATE: 18 BRPM | TEMPERATURE: 97 F | HEART RATE: 73 BPM | DIASTOLIC BLOOD PRESSURE: 88 MMHG

## 2022-01-12 DIAGNOSIS — F33.0 MILD EPISODE OF RECURRENT MAJOR DEPRESSIVE DISORDER (H): Primary | ICD-10-CM

## 2022-01-12 DIAGNOSIS — R60.0 BILATERAL LOWER EXTREMITY EDEMA: ICD-10-CM

## 2022-01-12 DIAGNOSIS — Z00.00 HEALTHCARE MAINTENANCE: ICD-10-CM

## 2022-01-12 DIAGNOSIS — N39.41 URGE INCONTINENCE: ICD-10-CM

## 2022-01-12 DIAGNOSIS — Z12.11 COLON CANCER SCREENING: ICD-10-CM

## 2022-01-12 DIAGNOSIS — I10 ESSENTIAL HYPERTENSION: ICD-10-CM

## 2022-01-12 DIAGNOSIS — Z12.31 VISIT FOR SCREENING MAMMOGRAM: ICD-10-CM

## 2022-01-12 PROBLEM — R53.81 PHYSICAL DECONDITIONING: Status: RESOLVED | Noted: 2018-05-31 | Resolved: 2022-01-12

## 2022-01-12 PROBLEM — E89.40 SURGICAL MENOPAUSE: Status: RESOLVED | Noted: 2017-12-14 | Resolved: 2022-01-12

## 2022-01-12 PROBLEM — E66.01 MORBID OBESITY (H): Status: ACTIVE | Noted: 2020-12-01

## 2022-01-12 PROBLEM — G89.29 CHRONIC PAIN: Status: ACTIVE | Noted: 2017-06-13

## 2022-01-12 PROCEDURE — 99214 OFFICE O/P EST MOD 30 MIN: CPT | Performed by: FAMILY MEDICINE

## 2022-01-12 RX ORDER — DULOXETIN HYDROCHLORIDE 30 MG/1
30 CAPSULE, DELAYED RELEASE ORAL 2 TIMES DAILY
Qty: 30 CAPSULE | Refills: 3 | Status: SHIPPED | OUTPATIENT
Start: 2022-01-12 | End: 2023-06-20

## 2022-01-12 RX ORDER — HYDROCHLOROTHIAZIDE 25 MG/1
25 TABLET ORAL DAILY
Qty: 30 TABLET | Refills: 3 | Status: SHIPPED | OUTPATIENT
Start: 2022-01-12 | End: 2023-06-28

## 2022-01-12 NOTE — LETTER
January 12, 2022      Sridevi Centeno  [address]   [place] [address]        To Whom It May Concern,      Sridevi Centeno has been a patient of mine for many years.  She has a longstanding history of depression and benefits from having an emotional support animal.  She should be allowed to keep her dog with her in her dwelling.          Sincerely,        Jaime Keane MD
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
## 2022-01-12 NOTE — LETTER
January 12, 2022      Sridevi Centeno  [address]   [place] [address]        To Whom It May Concern,     Sridevi Centeno has been my patient for many years.  She has a longstanding history of urge incontinence of urine with urologic work-up.  Incontinence briefs and pads are required for her self-care and apart of care for her medical condition.  They should be covered by her medical insurance.    Sincerely,        Jaime Keane MD           error

## 2022-01-13 NOTE — ASSESSMENT & PLAN NOTE
Patient reports increase in symptoms.  History of chronic depression, with psychotic features in the past.  PHQ 9 8.  Previously benefited from Cymbalta.  Restart Cymbalta 30 mg, see back 4 to 6 weeks, consider increasing Cymbalta up.  Referral to therapy.

## 2022-01-13 NOTE — ASSESSMENT & PLAN NOTE
Previous evaluation.  Needs a note to support insurance covering her incontinence supplies as this was recently cut off.  Letter written

## 2022-01-13 NOTE — ASSESSMENT & PLAN NOTE
No Pap, hysterectomy  Referral for mammogram  Referral for colonoscopy  Flu shot given, up-to-date COVID

## 2022-06-13 RX ORDER — OXYCODONE HYDROCHLORIDE 10 MG/1
TABLET, FILM COATED, EXTENDED RELEASE ORAL
COMMUNITY
Start: 2022-01-14

## 2022-06-20 ENCOUNTER — TELEPHONE (OUTPATIENT)
Dept: FAMILY MEDICINE | Facility: CLINIC | Age: 57
End: 2022-06-20
Payer: COMMERCIAL

## 2022-06-20 ENCOUNTER — TELEPHONE (OUTPATIENT)
Dept: FAMILY MEDICINE | Facility: CLINIC | Age: 57
End: 2022-06-20

## 2022-06-20 NOTE — TELEPHONE ENCOUNTER
Forms/Letter Request    Name of form/letter: accommodation for pet for housing, incontinence paperwork     Have you been seen for this request: No patient has virtual appt with Dr. Keane tomorrow and she wants to make sure he has these forms prior to appt.    Do we have the form/letter: No - friend/family will drop form off today. Caller wants to inform FD.    When is form/letter needed by: n/a    How would you like the form/letter returned: Fax    Patient Notified form requests are processed in 3-5 business days:Yes    Okay to leave a detailed message? Yes Cell number on file:    Telephone Information:   Mobile [phone]

## 2022-06-20 NOTE — TELEPHONE ENCOUNTER
Travel questionnaire was asked. Verified that they have no signs of COVID-19 symptoms.    Patient dropped off professional statement of need forms for  to fill out. Placed the original copies in the 's slot.    When forms are completed, patient would like it:    -Please call patient to let them know it's ready    Ok to leave a detailed message if unable to get a hold of the Patient.    Please re-route task back to the  to shred the copied forms and complete the task. Thanks!

## 2022-06-21 ENCOUNTER — VIRTUAL VISIT (OUTPATIENT)
Dept: FAMILY MEDICINE | Facility: CLINIC | Age: 57
End: 2022-06-21
Payer: COMMERCIAL

## 2022-06-21 DIAGNOSIS — M48.061 SPINAL STENOSIS OF LUMBAR REGION, UNSPECIFIED WHETHER NEUROGENIC CLAUDICATION PRESENT: ICD-10-CM

## 2022-06-21 DIAGNOSIS — I10 ESSENTIAL HYPERTENSION: ICD-10-CM

## 2022-06-21 DIAGNOSIS — M48.02 SPINAL STENOSIS IN CERVICAL REGION: ICD-10-CM

## 2022-06-21 DIAGNOSIS — Z00.00 HEALTHCARE MAINTENANCE: ICD-10-CM

## 2022-06-21 DIAGNOSIS — E66.01 MORBID OBESITY (H): ICD-10-CM

## 2022-06-21 DIAGNOSIS — E78.00 HYPERCHOLESTEREMIA: ICD-10-CM

## 2022-06-21 DIAGNOSIS — R06.2 WHEEZING: Primary | ICD-10-CM

## 2022-06-21 PROCEDURE — 99213 OFFICE O/P EST LOW 20 MIN: CPT | Mod: 95 | Performed by: FAMILY MEDICINE

## 2022-06-21 RX ORDER — ALBUTEROL SULFATE 90 UG/1
2 AEROSOL, METERED RESPIRATORY (INHALATION) EVERY 6 HOURS
Qty: 18 G | Refills: 3 | Status: SHIPPED | OUTPATIENT
Start: 2022-06-21 | End: 2023-01-11

## 2022-06-21 ASSESSMENT — PATIENT HEALTH QUESTIONNAIRE - PHQ9: SUM OF ALL RESPONSES TO PHQ QUESTIONS 1-9: 13

## 2022-06-21 NOTE — COMMUNITY RESOURCES LIST (ENGLISH)
06/21/2022   [facility]  Lauraryan Jiménez  For questions about this resource list or additional care needs, please contact your primary care clinic or care manager.  Phone: [phone]   Email: N/A   Address: [address] [place] [id number]   Hours: N/A        Hotlines and Helplines       Hotline - Crisis help  1  [facility] - Crisis Text Line - Crisis Text Line Distance: 0.58 miles      COVID-19 Status: Phone/Virtual   [address] [place] [address]  Language: English  Hours: Mon - Sun Open 24 Hours   Website: https://[place].gov/dhs/partners-and-providers/policies-procedures/adult-mental-health/crisis-text-line/     2  [employer] - [place] ([place] Distance: 0.66 miles      COVID-19 Status: Phone/Virtual   [address] [facility] [address] [place] [address]  Language: English  Hours: Mon - Sun Open 24 Hours   Phone: (144) 285-6946 Email: [place].[email or web address] Website: https://www.madd.org/[place]/          Mental Health       Individual counseling  3  [facility] Distance: 0.07 miles      COVID-19 Status: Phone/Virtual   [address] [place] [address]  Language: English  Hours: Mon - Fri 8:00 AM - 4:00 PM  Fees: Insurance   Phone: (504) 454-7724 Website: http://Diz[employer].org/     4  [facility] Distance: 0.12 miles      COVID-19 Status: Regular Operations, COVID-19 Status: Phone/Virtual   [address] [facility] [address] [place] [address]  Language: English  Hours: Mon - Fri 8:00 AM - 4:30 PM  Fees: Free   Phone: (442) 451-4051 Website: https://www.[place].gov/find-locations/facility/vc_0416V     Mental health crisis care  5  [facility] - Adult Mental Health Urgent [facility] - Adult Program Distance: 0.73 miles      COVID-19 Status: Regular Operations   [address] [place] [address]  Language: English, Hmong, Slovak  Hours: Mon - Fri 8:00 AM - 5:30 PM  Fees: Insurance, Self Pay, Sliding Fee   Phone:  (857) 745-3317 Website: https://www.[facility]./residents/health-medical/clinics-services/mental-behavorial-health/adult-mental-health-chemical-health/urgent-care-adult-mental-health     6  [employer] - Crisis Stabilization Services ([facility]) Distance: 4.56 miles      COVID-19 Status: Regular Operations   [address] [place] [address]  Language: English, Togolese  Hours: Mon - Sun Open 24 Hours  Fees: Insurance   Phone: (389) 580-2952 Email: info@[employer] Website: https://[employer].org/services-programs/residential-services/cezar-rufino-house/     Mental health support group  7  [facility] Distance: 0.12 miles      COVID-19 Status: Phone/Virtual   [address] [place] [address] [place] [address]  Language: English  Hours: Mon - Fri 8:00 AM - 4:30 PM  Fees: Free   Phone: (582) 934-3534 Website: https://www.va.gov/find-locations/facility/vc_0416V     8  [employer] [facility] [facility] Distance: 3.6 miles      COVID-19 Status: Regular Operations, COVID-19 Status: Phone/Virtual   [address] [place] [address]  Language: English  Hours: Mon - Thu 12:00 PM - 5:00 PM  Fees: Free   Phone: (816) 567-5433 Email: info@[employer].org Website: http://emotionsanonymous.org/          Important Numbers & Websites       Emergency Services   911  [facility] Services   311  Poison Control   (227) 624-5775  Suicide Prevention Lifeline   (460) 177-7555 (TALK)  Child Abuse Hotline   (536) 606-8530 (4-A-Child)  Sexual Assault Hotline   (130) 927-4715 (HOPE)  National Runaway Safeline   (830) 622-2332 (RUNAWAY)  All-Options Talkline   (238) 443-1996  Substance Abuse Referral   (617) 384-2415 (HELP)

## 2022-06-21 NOTE — TELEPHONE ENCOUNTER
Patient call to make sure her forms that was drop off on 6/20/22 has been given to Dr. Keane for patient up coming appt today at 5PM.

## 2022-06-21 NOTE — PROGRESS NOTES
Sridevi is a 56 year old who is being evaluated via a billable video visit.      How would you like to obtain your AVS? MyChart  If the video visit is dropped, the invitation should be resent by: Text to cell phone: [phone]   Will anyone else be joining your video visit? No      Spinal stenosis of lumbar region, unspecified whether neurogenic claudication present  Ongoing pain.  Per spine clinic  Long-term handicap sticker forms filled out.    Wheezing  Recommend in person follow-up for evaluation  Scribed albuterol which she has been using from a friend which has been helpful    Hypertension  Needs in person follow-up.  Not currently taking medications    Hypercholesteremia  In person follow-up with lipid    Healthcare maintenance  History of hysterectomy, no Pap  Due for follow-up on lipids  Mammo  Colorectal cancer screening, will discuss in person visit      Morbid obesity (H)  Encourage efforts at a good diet and weight loss.     Chief complaint today was that she needs a number of forms filled out for housing.  History of mental illness as well as severe lumbar spine stenosis and cervical spine stenosis which limits her mobility.  Did not discuss depression at length today but patient has long history of this.    1 to 2 years of wheezing, on and off, no chest pain.  Thinks it may be due to her weight.  Friends have noticed it.  She has used somebody else's inhaler which seem to help.  No history of asthma.  Occasional cigarette use, once or twice per week.  Vitals:  No vitals were obtained today due to virtual visit.    Video-Visit Details    Video Start Time: 525    Type of service:  Video Visit    Video End Time:536    Originating Location (pt. Location): Home    Distant Location (provider location):  [facility]     Platform used for Video Visit: Sparkroom  ..

## 2022-06-21 NOTE — LETTER
June 21, 2022      Sridevi Centeno  [address]   [place] [address]        To Whom It May Concern,     I have been Sridevi's doctor for many years.  She has long history of mental health issues and should be allowed to have emotional support animal lives with her and her dwelling.            Sincerely,        Jaime Keane MD

## 2022-06-21 NOTE — LETTER
June 21, 2022      Sridevi Centeno  [address]   [place] [address]        To Whom It May Concern,     Sridevi has been my patient for many years.  She has a long history of urinary urgency and urge incontinence.  Her incontinence supplies should be covered by her medical insurance.        Sincerely,        Jaime Keane MD

## 2022-06-22 NOTE — ASSESSMENT & PLAN NOTE
Recommend in person follow-up for evaluation  Scribed albuterol which she has been using from a friend which has been helpful

## 2022-06-22 NOTE — ASSESSMENT & PLAN NOTE
History of hysterectomy, no Pap  Due for follow-up on lipids  Mammo  Colorectal cancer screening, will discuss in person visit

## 2022-08-06 ENCOUNTER — HEALTH MAINTENANCE LETTER (OUTPATIENT)
Age: 57
End: 2022-08-06

## 2022-10-22 ENCOUNTER — HEALTH MAINTENANCE LETTER (OUTPATIENT)
Age: 57
End: 2022-10-22

## 2023-01-10 DIAGNOSIS — R06.2 WHEEZING: ICD-10-CM

## 2023-01-11 RX ORDER — ALBUTEROL SULFATE 90 UG/1
AEROSOL, METERED RESPIRATORY (INHALATION)
Qty: 18 G | Refills: 0 | Status: SHIPPED | OUTPATIENT
Start: 2023-01-11 | End: 2023-09-19

## 2023-01-11 NOTE — TELEPHONE ENCOUNTER
"Routing refill request to provider for review/approval because:  Requesting provider review due to volume of use VS. Possible early refill    Last Written Prescription Date:  6/21/22  Last Fill Quantity: 18g,  # refills: 3   Last office visit provider:   6/21/22    Requested Prescriptions   Pending Prescriptions Disp Refills     albuterol (PROAIR HFA/PROVENTIL HFA/VENTOLIN HFA) 108 (90 Base) MCG/ACT inhaler [Pharmacy Med Name: Albuterol Sulfate HFA Inhalation Aerosol Solution 108 (90 Base) MCG/ACT] 18 g 0     Sig: Inhale 2 puffs by mouth into the lungs every 6 hours       Asthma Maintenance Inhalers - Anticholinergics Passed - 1/11/2023 12:50 PM        Passed - Patient is age 12 years or older        Passed - Recent (12 mo) or future (30 days) visit within the authorizing provider's specialty     Patient has had an office visit with the authorizing provider or a provider within the authorizing providers department within the previous 12 mos or has a future within next 30 days. See \"Patient Info\" tab in inbasket, or \"Choose Columns\" in Meds & Orders section of the refill encounter.              Passed - Medication is active on med list       Short-Acting Beta Agonist Inhalers Protocol  Passed - 1/11/2023 12:50 PM        Passed - Patient is age 12 or older        Passed - Recent (12 mo) or future (30 days) visit within the authorizing provider's specialty     Patient has had an office visit with the authorizing provider or a provider within the authorizing providers department within the previous 12 mos or has a future within next 30 days. See \"Patient Info\" tab in inbasket, or \"Choose Columns\" in Meds & Orders section of the refill encounter.              Passed - Medication is active on med list             CARROLL BRUNO RN 01/11/23 12:50 PM  "

## 2023-01-18 NOTE — TELEPHONE ENCOUNTER
Unable to LM at   [phone]  due to number not in service . Sending letter out and postponing task out to 2 weeks and will try again if an appointment hasn't been made.

## 2023-02-01 NOTE — TELEPHONE ENCOUNTER
We have made several attempts to contact patient by phone and letter to schedule an appointment. Unfortunately, our calls have not been returned and we were unable to schedule. At this time, we will no longer make an attempt to schedule this appointment. Completing task.

## 2023-06-20 DIAGNOSIS — F33.0 MILD EPISODE OF RECURRENT MAJOR DEPRESSIVE DISORDER (H): ICD-10-CM

## 2023-06-20 RX ORDER — DULOXETIN HYDROCHLORIDE 30 MG/1
30 CAPSULE, DELAYED RELEASE ORAL 2 TIMES DAILY
Qty: 30 CAPSULE | Refills: 3 | Status: SHIPPED | OUTPATIENT
Start: 2023-06-20 | End: 2024-01-31

## 2023-06-20 NOTE — TELEPHONE ENCOUNTER
Medication Question or Refill        What medication are you calling about (include dose and sig)?:    Disp Refills Start End JA   DULoxetine (CYMBALTA) 30 MG capsule 30 capsule 3 1/12/2022  --   Sig - Route: Take 1 capsule (30 mg) by mouth 2 times daily - Oral         Preferred Pharmacy:   [id number] - [place], [place] - [address] [facility]  [address] [place] [address]  Phone: [phone] Fax: [phone]    Controlled Substance Agreement on file:   CSA -- Patient Level:    CSA: None found at the patient level.       Who prescribed the medication?:      Do you need a refill? Yes    When did you use the medication last? Per pt, she is completely out of med for 5 days now and in need of it ASAP.    Patient offered an appointment? No    Do you have any questions or concerns?  No      Could we send this information to you in [facility] or would you prefer to receive a phone call?:   Patient would prefer a phone call   Okay to leave a detailed message?: Yes at Home number on file [phone] (home)

## 2023-06-28 ENCOUNTER — VIRTUAL VISIT (OUTPATIENT)
Dept: FAMILY MEDICINE | Facility: CLINIC | Age: 58
End: 2023-06-28
Payer: COMMERCIAL

## 2023-06-28 DIAGNOSIS — Z00.00 HEALTHCARE MAINTENANCE: ICD-10-CM

## 2023-06-28 DIAGNOSIS — Z09 HOSPITAL DISCHARGE FOLLOW-UP: ICD-10-CM

## 2023-06-28 DIAGNOSIS — R06.2 WHEEZING: Primary | ICD-10-CM

## 2023-06-28 DIAGNOSIS — I10 ESSENTIAL HYPERTENSION: ICD-10-CM

## 2023-06-28 DIAGNOSIS — R06.83 SNORING: ICD-10-CM

## 2023-06-28 PROCEDURE — 99442 PR PHYSICIAN TELEPHONE EVALUATION 11-20 MIN: CPT | Mod: 95 | Performed by: FAMILY MEDICINE

## 2023-06-28 RX ORDER — HYDROCHLOROTHIAZIDE 25 MG/1
25 TABLET ORAL DAILY
Qty: 30 TABLET | Refills: 3 | Status: SHIPPED | OUTPATIENT
Start: 2023-06-28 | End: 2023-09-28

## 2023-06-28 ASSESSMENT — PATIENT HEALTH QUESTIONNAIRE - PHQ9
10. IF YOU CHECKED OFF ANY PROBLEMS, HOW DIFFICULT HAVE THESE PROBLEMS MADE IT FOR YOU TO DO YOUR WORK, TAKE CARE OF THINGS AT HOME, OR GET ALONG WITH OTHER PEOPLE: SOMEWHAT DIFFICULT
SUM OF ALL RESPONSES TO PHQ QUESTIONS 1-9: 6
SUM OF ALL RESPONSES TO PHQ QUESTIONS 1-9: 6

## 2023-06-28 NOTE — PROGRESS NOTES
Sridevi is a 57 year old who is being evaluated via a billable telephone visit.      What phone number would you like to be contacted at? [phone]  How would you like to obtain your AVS? Luna  Distant Location (provider location):  On-site   Patient was hospitalized at [facility] between 5/8 and 5/10/2023 after a fall.  She was with her dog, apparently hit her head.  Had recently taken her opiate medication which she is prescribed.  EMS was called.  It was felt by emergency room staff that she had decreased level of consciousness.  Patient indicates she can remember everything from the ER.  Was noted to have elevated creatinine, elevated troponin, potassium is low at 3.3.  She was admitted, observed overnight, given fluids and cardiology consult obtained.  Given elevated troponin, she was sent to the Cath Lab.  She had normal coronary arteries and elevated troponin was felt to be an enzyme leak.  She was started on Cymbalta.  Told to stop hydrochlorothiazide.    Patient indicates she has continued her other medications.  Has restarted the hydrochlorothiazide because her blood pressures been high and wants a refill on this.    History of depression, wanting an emotional support animal.  She has a follow-up with me in August.    He Timbo that she has had wheezing for many years.  Also was noted to have snoring when in the hospital and sleep study was recommended  1. Hospital discharge follow-up  As above  MED REC REQUIRED  Post Medication Reconciliation Status: discharge medications reconciled, continue medications without change    2. Wheezing  Longstanding, both in bed when trying to sleep and when awake.  No significant coughing.  Recent negative cardiac work-up.  Check PFTs  - General PFT Lab (Please always keep checked); Future  - Pulmonary Function Test; Future    3. Hypertension  Pressures elevated, restart hydrochlorothiazide    4. Snoring  Referral for sleep study as recommended by  discharging team  - Adult Sleep Eval & Management  Referral; Future    5. Healthcare maintenance  Patient due for mammogram, colonoscopy, hepatitis B and zoster.  She requested that we discussed this upon and physical exam on August 8.            Phone call duration:14 minutes    Answers for HPI/ROS submitted by the patient on 6/28/2023  If you checked off any problems, how difficult have these problems made it for you to do your work, take care of things at home, or get along with other people?: Somewhat difficult  PHQ9 TOTAL SCORE: 6

## 2023-06-28 NOTE — LETTER
June 28, 2023      Sridevi Centeno  [address]   [place] [address]        To Whom It May Concern,         I have been Sridevi's doctor for many years.  She has long history of mental health issues and should be allowed to have emotional support animal lives with her and her dwelling.                 Sincerely,           Jaime Keane MD

## 2023-08-27 ENCOUNTER — HEALTH MAINTENANCE LETTER (OUTPATIENT)
Age: 58
End: 2023-08-27

## 2023-09-18 ENCOUNTER — TELEPHONE (OUTPATIENT)
Dept: FAMILY MEDICINE | Facility: CLINIC | Age: 58
End: 2023-09-18

## 2023-09-18 NOTE — TELEPHONE ENCOUNTER
Patient Quality Outreach    Patient is due for the following:   Urine Drug Screen  Colon Cancer Screening  Breast Cancer Screening - Mammogram  Physical Annual Wellness Visit      Topic Date Due    Hepatitis B Vaccine (1 of 3 - 3-dose series) Never done    Zoster (Shingles) Vaccine (1 of 2) Never done    Flu Vaccine (1) 09/01/2023       Next Steps:   Patient has upcoming appointment, these items will be addressed at that time.    Type of outreach:    Chart review performed, no outreach needed.      Questions for provider review:    None           Johanny Clark

## 2023-09-19 ENCOUNTER — OFFICE VISIT (OUTPATIENT)
Dept: FAMILY MEDICINE | Facility: CLINIC | Age: 58
End: 2023-09-19
Payer: COMMERCIAL

## 2023-09-19 VITALS
DIASTOLIC BLOOD PRESSURE: 76 MMHG | BODY MASS INDEX: 45.82 KG/M2 | TEMPERATURE: 97.1 F | OXYGEN SATURATION: 99 % | WEIGHT: 249 LBS | RESPIRATION RATE: 16 BRPM | HEART RATE: 61 BPM | HEIGHT: 62 IN | SYSTOLIC BLOOD PRESSURE: 133 MMHG

## 2023-09-19 DIAGNOSIS — Z09 HOSPITAL DISCHARGE FOLLOW-UP: Primary | ICD-10-CM

## 2023-09-19 DIAGNOSIS — R06.2 WHEEZING: ICD-10-CM

## 2023-09-19 DIAGNOSIS — M79.89 LEFT LEG SWELLING: ICD-10-CM

## 2023-09-19 DIAGNOSIS — R94.31 ABNORMAL ELECTROCARDIOGRAM (ECG) (EKG): ICD-10-CM

## 2023-09-19 DIAGNOSIS — N17.9 AKI (ACUTE KIDNEY INJURY) (H): ICD-10-CM

## 2023-09-19 DIAGNOSIS — E61.1 LOW IRON: ICD-10-CM

## 2023-09-19 DIAGNOSIS — D64.9 NORMOCYTIC ANEMIA: ICD-10-CM

## 2023-09-19 DIAGNOSIS — I45.81 PROLONGED QT INTERVAL SYNDROME: ICD-10-CM

## 2023-09-19 DIAGNOSIS — I26.92 ACUTE SADDLE PULMONARY EMBOLISM, UNSPECIFIED WHETHER ACUTE COR PULMONALE PRESENT (H): ICD-10-CM

## 2023-09-19 PROCEDURE — 99215 OFFICE O/P EST HI 40 MIN: CPT | Performed by: STUDENT IN AN ORGANIZED HEALTH CARE EDUCATION/TRAINING PROGRAM

## 2023-09-19 RX ORDER — ALBUTEROL SULFATE 90 UG/1
AEROSOL, METERED RESPIRATORY (INHALATION)
Qty: 18 G | Refills: 0 | Status: SHIPPED | OUTPATIENT
Start: 2023-09-19 | End: 2024-03-04

## 2023-09-19 RX ORDER — FERROUS SULFATE 325(65) MG
325 TABLET ORAL EVERY OTHER DAY
Qty: 90 TABLET | Refills: 1 | Status: SHIPPED | OUTPATIENT
Start: 2023-09-19

## 2023-09-19 ASSESSMENT — PATIENT HEALTH QUESTIONNAIRE - PHQ9
10. IF YOU CHECKED OFF ANY PROBLEMS, HOW DIFFICULT HAVE THESE PROBLEMS MADE IT FOR YOU TO DO YOUR WORK, TAKE CARE OF THINGS AT HOME, OR GET ALONG WITH OTHER PEOPLE: NOT DIFFICULT AT ALL
SUM OF ALL RESPONSES TO PHQ QUESTIONS 1-9: 2
SUM OF ALL RESPONSES TO PHQ QUESTIONS 1-9: 2

## 2023-09-19 NOTE — PROGRESS NOTES
Assessment & Plan     Hospital discharge follow-up  Acute saddle pulmonary embolism, unspecified whether acute cor pulmonale present (H)  atient was referred to cardiology at [facility] for management and recommendations regarding duration of anticoagulation, however patient would like to see cardiology and EP at [facility].  Both referrals for general cards and EP placed today. Pt reports Aunt had dad PE  from PE. Fmhx aneurysm.   -Continue Eliquis, refilled.  - apixaban ANTICOAGULANT (ELIQUIS ANTICOAGULANT) 5 MG tablet  Dispense: 90 tablet; Refill: 1  - Continue Oxygen prn 2L  - Adult Cardiology Eval  Referral  - consider referral to [facility] for work up given fmhx of blood clot.     Abnormal electrocardiogram (ECG) (EKG)  Prolonged QT interval syndrome  Noticed during admission.  Recommended the patient see electrophysiology.  Referral placed today.  No recent echocardiogram on file.  Recheck echo.  Patient was referred to cardiology at [facility] for management and recommendations regarding duration of anticoagulation, however patient would like to see cardiology and EP at [facility].  Both referrals for general cards and EP placed today.   - Echocardiogram Complete  - Adult Cardiology Eval  Referral    Low iron  Normocytic anemia  Continue iron supplementation.  - ferrous sulfate (FEROSUL) 325 (65 Fe) MG tablet  Dispense: 90 tablet; Refill: 1    Left leg swelling  Significantly larger compared to right leg. Pt reports that left leg chronically larger than right leg, however, did not seem to be as big as it is now prior to hospitalization. We will check for DVT, although likely will not .  - US Lower Extremity Venous Duplex Bilateral  - Echocardiogram Complete    SOURAV (acute kidney injury) (H)  Resolved prior to discharge.    Wheezing  No PFT on file.  Albuterol appears to help.  Ordered.  - albuterol (PROAIR HFA/PROVENTIL HFA/VENTOLIN HFA) 108 (90 Base) MCG/ACT inhaler  " Dispense: 18 g; Refill: 0      Review of external notes as documented elsewhere in note  45 minutes spent by me on the date of the encounter doing chart review, history and exam, documentation and further activities per the note  [id number]}   MED REC REQUIRED{  Post Medication Reconciliation Status:     BMI:   Estimated body mass index is 45.53 kg/m  as calculated from the following:    Height as of this encounter: 1.575 m (5' 2.01\").    Weight as of this encounter: 112.9 kg (249 lb).   Weight management plan: Discussed healthy diet and exercise guidelines        Mirna Arzate MD  [facility]    Baltazar Laureano is a 58 year old, presenting for the following health issues:  Hospital F/U (Refills for eliquis, need albuterol)        9/19/2023     1:35 PM   Additional Questions   Roomed by    Accompanied by self       HPI         Hospital Follow-up Visit:    Hospital/Nursing Home/IP Rehab Facility:  [facility]  Date of Admission: 9/12/2023  Date of Discharge: 9/13/2023  Reason(s) for Admission: Polmonary embolism    Was your hospitalization related to COVID-19? No   Problems taking medications regularly:  None  Medication changes since discharge: None  Problems adhering to non-medication therapy:  None    Summary of hospitalization:  CareEverywhere information obtained and reviewed  Diagnostic Tests/Treatments reviewed.  Follow up needed: none  Other Healthcare Providers Involved in Patient s Care:         Specialist appointment - cardiology, EP  Update since discharge: improved.     Plan of care communicated with patient     Still on oxygen.  Symptoms have improved.      Review of Systems   Constitutional: Negative for fever and chills.   Respiratory: Negative for cough or shortness of breath.    Cardiovascular: Negative for chest pain or chest pressure.   Gastrointestinal: Negative for nausea, vomiting, diarrhea or abdominal pain.        Objective    /76 (BP Location: Left arm, " "Patient Position: Sitting, Cuff Size: Adult Large)   Pulse 61   Temp 97.1  F (36.2  C) (Temporal)   Resp 16   Ht 1.575 m (5' 2.01\")   Wt 112.9 kg (249 lb)   LMP  (LMP Unknown)   SpO2 99%   BMI 45.53 kg/m    Body mass index is 45.53 kg/m .  Physical Exam   General Appearance:  Alert, cooperative, no distress, appears stated age.  Head:  Normocephalic, without obvious abnormality, atraumatic.  Eyes:  Conjunctivae/corneas clear, extraocular movements intact both eyes.  Lungs:  Clear to auscultation bilaterally, respirations unlabored.  Heart:  Regular rate and rhythm, S1 and S2 normal, no murmur, rub or gallop.  Abdomen:  Soft, non-tender, bowel sounds active all four quadrants.   Extremities:  Trace pitting edema in BLE, however left leg significantly larger than left.   Skin:  Skin color, texture, turgor normal, no rashes or lesions.  Neurologic: No focal deficits.        Prior to immunization administration, verified patients identity using patient s name and date of birth. Please see Immunization Activity for additional information.     Screening Questionnaire for Adult Immunization    Are you sick today?   No   Do you have allergies to medications, food, a vaccine component or latex?   Yes   Have you ever had a serious reaction after receiving a vaccination?   No   Do you have a long-term health problem with heart, lung, kidney, or metabolic disease (e.g., diabetes), asthma, a blood disorder, no spleen, complement component deficiency, a cochlear implant, or a spinal fluid leak?  Are you on long-term aspirin therapy?   Yes   Do you have cancer, leukemia, HIV/AIDS, or any other immune system problem?   No   Do you have a parent, brother, or sister with an immune system problem?   No   In the past 3 months, have you taken medications that affect  your immune system, such as prednisone, other steroids, or anticancer drugs; drugs for the treatment of rheumatoid arthritis, Crohn s disease, or psoriasis; or have " you had radiation treatments?   No   Have you had a seizure, or a brain or other nervous system problem?   No   During the past year, have you received a transfusion of blood or blood    products, or been given immune (gamma) globulin or antiviral drug?   Yes   For women: Are you pregnant or is there a chance you could become       pregnant during the next month?   No   Have you received any vaccinations in the past 4 weeks?   No     Immunization questionnaire was positive for at least one answer.  Notified provider.      Patient instructed to remain in clinic for 15 minutes afterwards, and to report any adverse reactions.     Screening performed by Vicente Chen MA on 9/19/2023 at 1:46 PM.           Answers submitted by the patient for this visit:  Patient Health Questionnaire (Submitted on 9/19/2023)  If you checked off any problems, how difficult have these problems made it for you to do your work, take care of things at home, or get along with other people?: Not difficult at all  PHQ9 TOTAL SCORE: 2

## 2023-09-19 NOTE — PROGRESS NOTES
Admission Date: 9/8/2023  Discharge Date: 9/11/2023    Discharge Plan: Sridevi Centeno was discharged to home.    Principal Diagnosis    Saddle PULMONARY EMBOLISM    Hospital Problem List  Principal Problem:  Acute saddle pulmonary embolism with acute cor pulmonale (HC)  Active Problems:  Essential hypertension  Generalized anxiety disorder  Major depression, recurrent (HC)    ADDITIONAL COMMENTS REGARDING DIAGNOSIS SPECIFICITY  Additional Diagnosis Information    BMI>30, which is consistent with OBESITY. This is clinically significant due to increased nursing cares, use of resources and specialty equipment.  The patient is on a long-acting opioid, which is consistent with the diagnosis of OPIOID DEPENDENCE.    R leg 47cm, L leg 56  DVT US admission neg bilaterally.     Hospital Course    Sridevi Centeno is a 58 y.o. female with a history of depression, chronic pain who started having tight chest pain 2 days ago. She has been having dyspnea on exertion as well. She passed out several times which happened with any activity.    She had a negative coronary angiogram in May 2023.    In the ER she had elevated troponin and SOURAV with Creatinine of 1.74. her D dimer was elevated. She will get a CT chest and will be admitted.CT chest result which just came with a result of saddle pulmonary emboli. Echocardiogram did show moderate to sever RV dysfunction.    IR was able to remove a large clot burden on 9/9  No recent trips, flights, surgeries. Noted left lower extremity swelling  9/9 Patient converted from Heparin to Eliquis    9/10 orders in for d/c tomorrow , probably needs home oxygen. Cardiology consult noted no further recommendations for the PULMONARY EMBOLISM / RV dysfunction but did note a prolonged QT. They recommend outpatient EP MD referral for prolonged QT syndrome. ***Appt with Cardiology 11/29/23 9/11 OXYGEN ok at rest but desaturates to < 88% with activity so Ms. Centeno is discharged on OXYGEN at 2 liters  by NP***.      Recommendations for Outpatient Provider PCP: Jaime Keane MD    Admission: 2023 @ [facility]    Specific recommendations to be addressed at the follow up visit: will need review in 3 to 6 months regarding continuation of the DOAC  Medication changes: see below  Follow up labs/imaging: none  Other specialty follow-up not included in DC orders: Cardiology EP for incidentally noted prolonged QT interval    ***Aunt had dad PE  from PE. Fmhx aneurysm. ***    Follow-Up    After Hospital Follow Up Appointment(s)  When to follow up: 4 to 6 weeks  After Hospital Follow Up Appointment(s)  We have received an order from your discharging physician; unfortunately we are unable to schedule the appointment before you are discharged. Please contact [facility] office at [phone] on the first business day following your discharge.  When to follow up: 2 months  Primary Care Provider follow up appointment(s)  You have an appointment with Dr. Jaime Keane MD on 2023 at 4:05 p.m. at [facility].    If you have questions or concerns please call the clinic at [phone].  When to follow up: 1 to 5 days        Discharge Medications      Your Home Medicines      START taking these medicines    Instructions  Eliquis 5 mg tablet  For diagnoses: Acute saddle pulmonary embolism with acute cor pulmonale (HC)  Generic drug: apixaban  Take 2 tablets by mouth twice daily through 9/15, then 1 tablet twice daily starting 2023    oxygen-air delivery systems  For diagnoses: Acute saddle pulmonary embolism with acute cor pulmonale (HC)  Commonly known as: HOME OXYGEN  Oxygen for home use. Liters per minute: 2 per nasal cannula. Frequency of use: With activity;. Length of need: 3 Months.          CONTINUE taking these medicines    Instructions***  albuterol HFA 90 mcg/actuation inhaler  Commonly known as: PRO-AIR; VENTOLIN; PROVENTIL  Inhale 1-2  Puffs by mouth 4 times daily if needed for Shortness Of Breath.    DULoxetine 30 mg Delayed-release capsule  Commonly known as: CYMBALTA  Take 30 mg by mouth two times daily.    Excedrin 250-250-65 mg  Generic drug: aspirin-acetaminophen-caffeine  Take 1 Tablet by mouth every 6 hours if needed for Headache. Max acetaminophen dose: 4000mg in 24 hrs.    Lyrica 200 mg capsule  Generic drug: pregabalin  Take 200 mg by mouth three times daily.    oxyCODONE-acetaminophen ( mg)  mg per tablet  Commonly known as: PERCOCET  TAKE 1 TABLET BY MOUTH EVERY 4 HOURS AS NEEDED MAX 5 TABLETS/ DAY    OxyCONTIN 10 mg SUSTAINED release tablet  Generic drug: oxyCODONE  Take 10 mg by mouth every 12 hours.

## 2023-09-28 ENCOUNTER — LAB (OUTPATIENT)
Dept: FAMILY MEDICINE | Facility: CLINIC | Age: 58
End: 2023-09-28

## 2023-09-28 ENCOUNTER — OFFICE VISIT (OUTPATIENT)
Dept: FAMILY MEDICINE | Facility: CLINIC | Age: 58
End: 2023-09-28
Payer: COMMERCIAL

## 2023-09-28 VITALS
HEART RATE: 62 BPM | RESPIRATION RATE: 18 BRPM | WEIGHT: 240.25 LBS | TEMPERATURE: 98.1 F | HEIGHT: 62 IN | BODY MASS INDEX: 44.21 KG/M2 | SYSTOLIC BLOOD PRESSURE: 125 MMHG | OXYGEN SATURATION: 84 % | DIASTOLIC BLOOD PRESSURE: 84 MMHG

## 2023-09-28 DIAGNOSIS — R94.31 ABNORMAL ELECTROCARDIOGRAM (ECG) (EKG): ICD-10-CM

## 2023-09-28 DIAGNOSIS — Z13.220 SCREENING FOR HYPERLIPIDEMIA: ICD-10-CM

## 2023-09-28 DIAGNOSIS — Z00.00 HEALTHCARE MAINTENANCE: ICD-10-CM

## 2023-09-28 DIAGNOSIS — Z00.00 HEALTH CARE MAINTENANCE: ICD-10-CM

## 2023-09-28 DIAGNOSIS — Z12.11 SCREEN FOR COLON CANCER: Primary | ICD-10-CM

## 2023-09-28 DIAGNOSIS — Z12.11 SCREEN FOR COLON CANCER: ICD-10-CM

## 2023-09-28 DIAGNOSIS — I26.92 ACUTE SADDLE PULMONARY EMBOLISM, UNSPECIFIED WHETHER ACUTE COR PULMONALE PRESENT (H): ICD-10-CM

## 2023-09-28 DIAGNOSIS — D64.9 ANEMIA, UNSPECIFIED TYPE: ICD-10-CM

## 2023-09-28 DIAGNOSIS — I45.81 PROLONGED QT INTERVAL SYNDROME: ICD-10-CM

## 2023-09-28 DIAGNOSIS — Z12.31 VISIT FOR SCREENING MAMMOGRAM: ICD-10-CM

## 2023-09-28 LAB
BASOPHILS # BLD AUTO: 0.1 10E3/UL (ref 0–0.2)
BASOPHILS NFR BLD AUTO: 1 %
CHOLEST SERPL-MCNC: 194 MG/DL
EOSINOPHIL # BLD AUTO: 0 10E3/UL (ref 0–0.7)
EOSINOPHIL NFR BLD AUTO: 0 %
ERYTHROCYTE [DISTWIDTH] IN BLOOD BY AUTOMATED COUNT: 14 % (ref 10–15)
FERRITIN SERPL-MCNC: 159 NG/ML (ref 11–328)
HCT VFR BLD AUTO: 31.6 % (ref 35–47)
HDLC SERPL-MCNC: 68 MG/DL
HGB BLD-MCNC: 9.6 G/DL (ref 11.7–15.7)
IMM GRANULOCYTES # BLD: 0 10E3/UL
IMM GRANULOCYTES NFR BLD: 0 %
IRON BINDING CAPACITY (ROCHE): 345 UG/DL (ref 240–430)
IRON SATN MFR SERPL: 15 % (ref 15–46)
IRON SERPL-MCNC: 51 UG/DL (ref 37–145)
LDLC SERPL CALC-MCNC: 104 MG/DL
LYMPHOCYTES # BLD AUTO: 1.7 10E3/UL (ref 0.8–5.3)
LYMPHOCYTES NFR BLD AUTO: 45 %
MCH RBC QN AUTO: 27.5 PG (ref 26.5–33)
MCHC RBC AUTO-ENTMCNC: 30.4 G/DL (ref 31.5–36.5)
MCV RBC AUTO: 91 FL (ref 78–100)
MONOCYTES # BLD AUTO: 0.3 10E3/UL (ref 0–1.3)
MONOCYTES NFR BLD AUTO: 9 %
NEUTROPHILS # BLD AUTO: 1.6 10E3/UL (ref 1.6–8.3)
NEUTROPHILS NFR BLD AUTO: 44 %
NONHDLC SERPL-MCNC: 126 MG/DL
PLATELET # BLD AUTO: 207 10E3/UL (ref 150–450)
RBC # BLD AUTO: 3.49 10E6/UL (ref 3.8–5.2)
RETICS # AUTO: 0.06 10E6/UL (ref 0.01–0.11)
RETICS/RBC NFR AUTO: 1.8 % (ref 0.8–2.7)
TRIGL SERPL-MCNC: 112 MG/DL
WBC # BLD AUTO: 3.7 10E3/UL (ref 4–11)

## 2023-09-28 PROCEDURE — 83540 ASSAY OF IRON: CPT | Performed by: FAMILY MEDICINE

## 2023-09-28 PROCEDURE — 84238 ASSAY NONENDOCRINE RECEPTOR: CPT | Mod: 90 | Performed by: FAMILY MEDICINE

## 2023-09-28 PROCEDURE — 91320 SARSCV2 VAC 30MCG TRS-SUC IM: CPT | Performed by: FAMILY MEDICINE

## 2023-09-28 PROCEDURE — 36415 COLL VENOUS BLD VENIPUNCTURE: CPT | Performed by: FAMILY MEDICINE

## 2023-09-28 PROCEDURE — 83550 IRON BINDING TEST: CPT | Performed by: FAMILY MEDICINE

## 2023-09-28 PROCEDURE — 99000 SPECIMEN HANDLING OFFICE-LAB: CPT | Performed by: FAMILY MEDICINE

## 2023-09-28 PROCEDURE — 90682 RIV4 VACC RECOMBINANT DNA IM: CPT | Performed by: FAMILY MEDICINE

## 2023-09-28 PROCEDURE — 85025 COMPLETE CBC W/AUTO DIFF WBC: CPT | Performed by: FAMILY MEDICINE

## 2023-09-28 PROCEDURE — 85045 AUTOMATED RETICULOCYTE COUNT: CPT | Performed by: FAMILY MEDICINE

## 2023-09-28 PROCEDURE — 82728 ASSAY OF FERRITIN: CPT | Performed by: FAMILY MEDICINE

## 2023-09-28 PROCEDURE — G0008 ADMIN INFLUENZA VIRUS VAC: HCPCS | Performed by: FAMILY MEDICINE

## 2023-09-28 PROCEDURE — 90480 ADMN SARSCOV2 VAC 1/ONLY CMP: CPT | Performed by: FAMILY MEDICINE

## 2023-09-28 PROCEDURE — 99215 OFFICE O/P EST HI 40 MIN: CPT | Mod: 25 | Performed by: FAMILY MEDICINE

## 2023-09-28 PROCEDURE — 80061 LIPID PANEL: CPT | Performed by: FAMILY MEDICINE

## 2023-09-28 RX ORDER — OXYCODONE AND ACETAMINOPHEN 10; 325 MG/1; MG/1
1 TABLET ORAL EVERY 4 HOURS PRN
COMMUNITY

## 2023-09-28 NOTE — ASSESSMENT & PLAN NOTE
Unclear cause, mixed iron studies last time.  Last hemoglobin 8.8.  Recheck with CBC, iron studies, reticulocyte count,'s full differential.  Also soluble transferrin.  Patient has had hysterectomy, no other source of bleeding noted.  Cologuard ordered today for screening purposes.

## 2023-09-28 NOTE — ASSESSMENT & PLAN NOTE
Flu shot and COVID today  Referral for mammo and cologuard-discussed colonoscopy but patient cannot interrupt DOAC for the first 3 months.

## 2023-09-28 NOTE — ASSESSMENT & PLAN NOTE
Discussed long-term anticoagulation so long as she is low risk for bleeding.  Of course anemia, discovered earlier causes under question, will follow up.  No other risk of bleeding known.  Discussed option of hematology referral should we want to consider discontinuing DOAC    Oximetry relatively good today.  94% at rest, only dropped to 93 with ambulation.  Okay to stop supplemental oxygen.

## 2023-09-28 NOTE — COMMUNITY RESOURCES LIST (ENGLISH)
09/28/2023   [facility]  N/A  For questions about this resource list or additional care needs, please contact your primary care clinic or care manager.  Phone: [phone]   Email: N/A   Address: [address] [place] [address]   Hours: N/A        Food and Nutrition       Food pantry  1  [facility] [place] Distance: 1.13 miles      [facility]   [address] [place] [address]  Language: English, Hmong, Dilshad, Cuban  Hours: Mon 11:00 AM - 3:00 PM , Wed 11:00 AM - 3:00 PM , Fri 11:00 AM - 3:00 PM  Fees: Free, Self Pay   Phone: (935) 979-1713 Email: Jany@[facility] Website: http://[facility].org/Critical access hospital/[address]/     2  [facility] Distance: 1.29 miles      Pickup   [address] [place] [address]  Language: Tamazight, English, Hmong, Moni, Cuban  Hours: Mon 1:00 PM - 4:00 PM , Mon 5:00 PM - 6:30 PM , Tue - Fri 9:00 AM - 11:30 AM , Tue - Fri 1:00 PM - 3:30 PM  Fees: Free   Phone: (118) 984-5068 Website: https://[facility].org/     SNAP application assistance  3  [facility] Distance: 0.21 miles      In-Person, Phone/Virtual   [address] [place] [address]  Language: English  Hours: Mon - Fri 8:00 AM - 4:30 PM  Fees: Free   Phone: (957) 286-9735 Email: radha@[facility]. Website: https://www.[facility]./[facility]/departments/health-and-wellness     4  [facility] - [place] (SNAP) Distance: 0.45 miles      Phone/Virtual   [address] [address] [place] [address]  Language: English, Hmong, [place] citizen, Slovenian, Cuban, Upper sorbian  Hours: Mon - Fri 9:00 AM - 5:00 PM  Fees: Free   Phone: (649) 231-5269 Website: https://[place].gov/dhs/people-we-serve/adults/economic-assistance/food-nutrition/programs-and-services/supplemental-nutrition-assistance-program.jsp     Kellyp  kitchen or free meals  5  [facility] [address] - [facility] - [place] Distance: 0.64 miles      In-Person   [address] [place] [address]  Language: English  Hours: Mon - Sun 4:30 PM - 5:30 PM  Fees: Free   Phone: (732) 281-2981 Email: info@[employer] Website: https://www.[employer]/locations/[facility]/     6  [facility] - [place] Distance: 1.16 miles      [address] [address]  Language: English  Hours: Mon - Fri 5:00 PM - 6:00 PM  Fees: Free   Phone: (369) 834-7050 Email: fransico@[employer] Website: http://www.[employer]/          Important Numbers & Websites       Emergency Services   [address]  [place] [address]  Poison Control   (112) 287-5387  Suicide Prevention Lifeline   (520) 850-9953 (TALK)  Child Abuse Hotline   (246) 882-7803 (4-A-Child)  Sexual Assault Hotline   (361) 510-8840 (HOPE)  National Runaway Safeline   (349) 792-7623 (RUNAWAY)  All-Options Talkline   (958) 863-9943  Substance Abuse Referral   (573) 494-4834 (HELP)

## 2023-09-28 NOTE — COMMUNITY RESOURCES LIST (ENGLISH)
09/28/2023   [facility]  N/A  For additional resource needs, please contact your health insurance member services or your primary care team.  Phone: [phone]   Email: N/A   Address: [address] [place] [address]   Hours: N/A        Food and Nutrition       Food pantry  1  [facility] Distance: 1.13 miles      [facility]   [address] [place] [address]  Language: English, Hmong, Sammarinese, Nigerien  Hours: Mon 11:00 AM - 3:00 PM , Wed 11:00 AM - 3:00 PM , Fri 11:00 AM - 3:00 PM  Fees: Free, Self Pay   Phone: (459) 690-7669 Email: Jany@[facility] Website: http://[facility].org/[facility]/[address]/     2  [facility] Distance: 1.29 miles      [facility]   [address] [place] [address]  Language: Mohawk, English, Hmong, Moni, Nigerien  Hours: Mon 1:00 PM - 4:00 PM , Mon 5:00 PM - 6:30 PM , Tue - Fri 9:00 AM - 11:30 AM , Tue - Fri 1:00 PM - 3:30 PM  Fees: Free   Phone: (528) 515-5573 Website: https://[facility].org/     SNAP application assistance  3  [employer] [place] Distance: 1.05 miles      Phone/Virtual   [address] [facility] [address] [place] [address]  Language: English, Hmong, Maltese, Uruguayan, Nigerien  Hours: Mon - Fri 8:30 AM - 4:30 PM  Fees: Free   Phone: (285) 570-1301 Email: helpline@[employer].org Website: https://www.[employer]ers[employer].org/programs/[place]-food-helpline/     4  [facility] Distance: 0.21 miles      In-Person, Phone/Virtual   [address] [facility] [address] [place] [address]  Language: English  Hours: Mon - Fri 8:00 AM - 4:30 PM  Fees: Free   Phone: (742) 680-3625 Email: radha@[facility]. Website: https://www.[facility]./your-government/departments/health-and-wellness     Soup kitchen or free meals  5  [facility]  - [facility] - [place] Distance: 0.64 miles      In-Person   [address] [place] [address]  Language: English  Hours: Mon - Sun 4:30 PM - 5:30 PM  Fees: Free   Phone: (828) 695-9843 Email: info@[employer] Website: https://www.[employer]/locations/higher-[facility]/     6  [facility] - [place] Distance: 1.16 miles      [address] [address]  Language: English  Hours: Mon - Fri 5:00 PM - 6:00 PM  Fees: Free   Phone: (930) 720-5380 Email: fransico@[employer] Website: http://www.[employer]/          Important Numbers & Websites       [address].org  Poison Control   (709) 911-1099 [place]po[place]  Suicide and Crisis Lifeline   [address] [place], [place] [address]line.org  Childhelp [place] Child Abuse Hotline   843.253.5903 Childhelphotline.org  [place] Sexual Assault Hotline   (781) 239-2925 (HOPE) Rainn.org  National Runaway Safeline   (943) 358-5871 (RUNAWAY) 1800runaway.org  Pregnancy & Postpartum Support [place]   Call/text 625-027-0804 Ppsupportmn.org  Substance Abuse National Helpline ([facility]   404-424-HELP (6073) Findtreatment.gov  Emergency Services   911

## 2023-09-28 NOTE — COMMUNITY RESOURCES LIST (ENGLISH)
09/28/2023   [facility]  N/A  For questions about this resource list or additional care needs, please contact your primary care clinic or care manager.  Phone: [phone]   Email: N/A   Address: [address] [place] [address]   Hours: N/A        Food and Nutrition       Food pantry  1  [facility] [place] [facility] [place] Distance: 1.13 miles      [facility]   [address] [place] [address]  Language: English, Hmong, Dilshad, Montenegrin  Hours: Mon 11:00 AM - 3:00 PM , Wed 11:00 AM - 3:00 PM , Fri 11:00 AM - 3:00 PM  Fees: Free, Self Pay   Phone: (998) 310-6249 Email: Jany@[facility] Website: http://[facility].org/[facility]/[address]/     2  [facility] Distance: 1.29 miles      Pickup   [address] [place] [address]  Language: Nepali, English, Hmong, Moni, Montenegrin  Hours: Mon 1:00 PM - 4:00 PM , Mon 5:00 PM - 6:30 PM , Tue - Fri 9:00 AM - 11:30 AM , Tue - Fri 1:00 PM - 3:30 PM  Fees: Free   Phone: (702) 680-3475 Website: https://[facility].org/     SNAP application assistance  3  [facility] Distance: 0.21 miles      In-Person, Phone/Virtual   [address] [place] [address]  Language: English  Hours: Mon - Fri 8:00 AM - 4:30 PM  Fees: Free   Phone: (947) 626-7796 Email: radha@[facility]. Website: https://www.[facility]./[facility]/departments/health-and-wellness     4  [facility] of Human Services - [place] (SNAP) Distance: 0.45 miles      Phone/Virtual   [address] [address] [place] [address]  Language: English, Hmong, Saudi Arabian, Nicaraguan, Montenegrin, Wolof  Hours: Mon - Fri 9:00 AM - 5:00 PM  Fees: Free   Phone: (511) 530-7848 Website: https://[place].gov/dhs/people-we-serve/adults/economic-assistance/food-nutrition/programs-and-services/supplemental-nutrition-assistance-program.jsp     Kellyp  kitchen or free meals  5  [facility] [place] - [address] - [facility] - [place] Distance: 0.64 miles      In-Person   [address] [place] [address]  Language: English  Hours: Mon - Sun 4:30 PM - 5:30 PM  Fees: Free   Phone: (318) 988-8693 Email: info@[employer] Website: https://www.[employer]/locations/[facility]/     6  [facility] Office - [place] Distance: 1.16 miles      [address] [address]  Language: English  Hours: Mon - Fri 5:00 PM - 6:00 PM  Fees: Free   Phone: (654) 871-7858 Email: fransico@[employer] Website: http://www.[employer]/          Important Numbers & Websites       Emergency Services   [address]  [place] [address]  Poison Control   (329) 594-4447  Suicide Prevention Lifeline   (395) 137-1370 (TALK)  Child Abuse Hotline   (665) 345-3464 (4-A-Child)  Sexual Assault Hotline   (791) 966-5578 (HOPE)  National Runaway Safeline   (748) 904-9112 (RUNAWAY)  All-Options Talkline   (889) 869-9676  Substance Abuse Referral   (781) 340-4097 (HELP)

## 2023-09-28 NOTE — ASSESSMENT & PLAN NOTE
Right ventricular dysfunction, upcoming echocardiogram  Appointment for cardiology scheduled weight in October.

## 2023-09-28 NOTE — PROGRESS NOTES
"Assessment/ Plan  Anemia, unspecified type  Unclear cause, mixed iron studies last time.  Last hemoglobin 8.8.  Recheck with CBC, iron studies, reticulocyte count,'s full differential.  Also soluble transferrin.  Patient has had hysterectomy, no other source of bleeding noted.  Cologuard ordered today for screening purposes.    Acute saddle pulmonary embolism, unspecified whether acute cor pulmonale present (H)  Discussed long-term anticoagulation so long as she is low risk for bleeding.  Of course anemia, discovered earlier causes under question, will follow up.  No other risk of bleeding known.  Discussed option of hematology referral should we want to consider discontinuing DOAC    Oximetry relatively good today.  94% at rest, only dropped to 93 with ambulation.  Okay to stop supplemental oxygen.    Prolonged QT interval syndrome  EP cardiology referral, January    Abnormal electrocardiogram (ECG) (EKG)  Right ventricular dysfunction, upcoming echocardiogram  Appointment for cardiology scheduled weight in October.    Health care maintenance  Flu shot and COVID today  Referral for mammo and cologuard-discussed colonoscopy but patient cannot interrupt DOAC for the first 3 months.     Subjective  CC:  chief complaint  HPI:    Admission [facility] 9/8 to 9/11/2023 for saddle pulmonary embolism.  Chest pain preceded admission by 2 days, dyspnea on exertion, 2 episodes of syncope.  Patient had had a normal coronary angiogram in 5/2023.  Elevated troponin and SOURAV with creatinine of 1.74 in the emergency room.  D-dimer was elevated.  Saddle pulmonary embolism seen on CT pulmonary angiogram.  Echocardiogram showed moderate to severe RV dysfunction.  Refer to interim tensional radiology, \"remove large clot burden on 9/9.  Noted to have no risk factors, unprovoked.  Converted from heparin to Eliquis and discharged.  At time of discharge, required home oxygen.,  Normal oxygen at rest but desatted to less than 88 on " ambulation.    Noted to have prolonged QT, recommended EP cardiology referral    Question of duration of direct anticoagulation    Discharge medications were unchanged except Eliquis 10 mg twice daily through 9/15 and then 5 mg twice daily indefinitely/as above        Answers submitted by the patient for this visit:  General Questionnaire (Submitted on 9/28/2023)  Chief Complaint: Chronic problems general questions HPI Form  What is the reason for your visit today? : Hospital Follow up/ Physical  How many servings of fruits and vegetables do you eat daily?: 0-1  On average, how many sweetened beverages do you drink each day (Examples: soda, juice, sweet tea, etc.  Do NOT count diet or artificially sweetened beverages)?: 1  How many minutes a day do you exercise enough to make your heart beat faster?: 60 or more  How many days per week do you miss taking your medication?: 0    PFSH:  Patient Active Problem List   Diagnosis    Hypokalemia    Hematuria    Urge Incontinence Of Urine    Chronic Constipation    Cervical Spine Stenosis    Generalized anxiety disorder    Patellofemoral Syndrome Of The Left Knee    Hypercholesteremia    Major Depression, Recurrent    Hypertension    Generalized Osteoarthritis    Cervical Disc Degeneration    Lumbar Radiculopathy    Bladder wall thickening    Hydronephrosis, right    Hypomagnesemia    Brachial neuritis or radiculitis NOS    Health care maintenance    Chronic pain    Osteoarthritis of knees, bilateral    Edema    Healthcare maintenance    Chronic patellofemoral pain of both knees    Morbid obesity (H)    Impairment of balance    Prinzmetal's angina (H)    Bilateral lower extremity edema    Snoring    Wheezing    Spinal stenosis of lumbar region, unspecified whether neurogenic claudication present    Anemia, unspecified type    Acute saddle pulmonary embolism, unspecified whether acute cor pulmonale present (H)    Prolonged QT interval syndrome    Abnormal electrocardiogram  "(ECG) (EKG)     albuterol (PROAIR HFA/PROVENTIL HFA/VENTOLIN HFA) 108 (90 Base) MCG/ACT inhaler, Inhale 2 puffs by mouth into the lungs every 6 hours  apixaban ANTICOAGULANT (ELIQUIS ANTICOAGULANT) 5 MG tablet, Take 1 tablet (5 mg) by mouth 2 times daily  DULoxetine (CYMBALTA) 30 MG capsule, Take 1 capsule (30 mg) by mouth 2 times daily  ferrous sulfate (FEROSUL) 325 (65 Fe) MG tablet, Take 1 tablet (325 mg) by mouth every other day  oxyCODONE IR (ROXICODONE) 10 MG tablet, Take 10 mg by mouth every 4 hours as needed for severe pain  OXYCONTIN 10 MG 12 hr tablet, TAKE ONE TABLET BY MOUTH TWICE A DAY AS DIRECTED  Pregabalin (LYRICA) 200 MG capsule,   aspirin-acetaminophen-caffeine (EXCEDRIN MIGRAINE) 250-250-65 MG tablet, Take 1 tablet by mouth daily as needed for headaches  hydrochlorothiazide (HYDRODIURIL) 25 MG tablet, Take 1 tablet (25 mg) by mouth daily (Patient not taking: Reported on 9/19/2023)  oxyCODONE-acetaminophen (PERCOCET)  MG per tablet, Take 1 tablet by mouth every 4 hours as needed for severe pain    No current facility-administered medications on file prior to visit.       History   Smoking Status    Former    Types: Cigarettes   Smokeless Tobacco    Never     Social History     Social History Narrative    Not on file     Patient Care Team:  Jaime Keane MD as PCP - General (Family Medicine)  Jaime Keane MD as Assigned PCP  Johnathan Kelly MD as MD (Cardiovascular Disease)  Jose Miguel Qureshi MD as MD (Cardiovascular Disease)        Objective  Physical Exam  Vitals:    09/28/23 1304 09/28/23 1305   BP: 125/84    BP Location: Right arm    Patient Position: Sitting    Cuff Size: Adult Large    Pulse: 62    Resp: 18    Temp: 98.1  F (36.7  C)    SpO2: 91% (!) 84%   Weight: 109 kg (240 lb 4 oz)    Height: 1.575 m (5' 2\")      Body mass index is 43.94 kg/m .  Gen- alert, oriented/ appropriately responsive  HEENT- normal cephalic, atraumatic.   Chest- Normal inspiration and expiration. "    Clear to ascultation.    No chest wall deformity or scar.  CV- Heart regular rate and rhythm  normal tones, no murmurs   No gallops or rubs.  Ext- appear well perfused, no edema  Skin- warm and dry,   no visualized rash  O2 sat did not drop with ambulation and was 94% at rest,  Diagnostics:   Results for orders placed or performed in visit on 09/28/23   CBC with platelets and differential     Status: Abnormal   Result Value Ref Range    WBC Count 3.7 (L) 4.0 - 11.0 10e3/uL    RBC Count 3.49 (L) 3.80 - 5.20 10e6/uL    Hemoglobin 9.6 (L) 11.7 - 15.7 g/dL    Hematocrit 31.6 (L) 35.0 - 47.0 %    MCV 91 78 - 100 fL    MCH 27.5 26.5 - 33.0 pg    MCHC 30.4 (L) 31.5 - 36.5 g/dL    RDW 14.0 10.0 - 15.0 %    Platelet Count 207 150 - 450 10e3/uL    % Neutrophils 44 %    % Lymphocytes 45 %    % Monocytes 9 %    % Eosinophils 0 %    % Basophils 1 %    % Immature Granulocytes 0 %    Absolute Neutrophils 1.6 1.6 - 8.3 10e3/uL    Absolute Lymphocytes 1.7 0.8 - 5.3 10e3/uL    Absolute Monocytes 0.3 0.0 - 1.3 10e3/uL    Absolute Eosinophils 0.0 0.0 - 0.7 10e3/uL    Absolute Basophils 0.1 0.0 - 0.2 10e3/uL    Absolute Immature Granulocytes 0.0 <=0.4 10e3/uL   CBC with Platelets & Differential     Status: Abnormal    Narrative    The following orders were created for panel order CBC with Platelets & Differential.  Procedure                               Abnormality         Status                     ---------                               -----------         ------                     CBC with platelets and d...[[id number]]  Abnormal            Final result                 Please view results for these tests on the individual orders.           Please note: Voice recognition software was used in this dictation.  It may therefore contain typographical errors.    A total of 55 minutes was spent on this visit reviewing previous notes, counseling patient, ordering tests , adjusting meds (see below) and documenting the findings in this  note

## 2023-09-28 NOTE — COMMUNITY RESOURCES LIST (ENGLISH)
09/28/2023   [facility]  N/A  For questions about this resource list or additional care needs, please contact your primary care clinic or care manager.  Phone: [phone]   Email: N/A   Address: [address] [place] [address]   Hours: N/A        Food and Nutrition       Food pantry  1  [facility] [place] Distance: 1.13 miles      [facility]   [address] [place] [address]  Language: English, Hmong, Dilshad, Stateless  Hours: Mon 11:00 AM - 3:00 PM , Wed 11:00 AM - 3:00 PM , Fri 11:00 AM - 3:00 PM  Fees: Free, Self Pay   Phone: (819) [phone] Email: [email or web address] Website: http://[facility]iesAl[facility] [employer] and TechnologySaint [facility]ius Hospital.org/[facility]/[address]/     2  [facility] Distance: 1.29 miles      Pickup   [address] [place] [address]  Language: Greenlandic, English, Hmong, Moni, Stateless  Hours: Mon 1:00 PM - 4:00 PM , Mon 5:00 PM - 6:30 PM , Tue - Fri 9:00 AM - 11:30 AM , Tue - Fri 1:00 PM - 3:30 PM  Fees: Free   Phone: (541) 227-6453 Website: [email or web address]/     SNAP application assistance  3  [facility] Distance: 0.21 miles      In-Person, Phone/Virtual   [address] [place] [address]  Language: English  Hours: Mon - Fri 8:00 AM - 4:30 PM  Fees: Free   Phone: (442) 918-1802 Email: [email or web address]. Website: https://www.[facility]./[facility]/departments/health-and-wellness     4  [facility] (SNAP) Distance: 0.45 miles      Phone/Virtual   [address] [address] [place] [address]  Language: English, Hmong, Solomon Islander, Maldivian, Stateless, Maori  Hours: Mon - Fri 9:00 AM - 5:00 PM  Fees: Free   Phone: (514) 560-3684 Website: https://[place].gov/dhs/people-we-serve/adults/economic-assistance/food-nutrition/programs-and-services/supplemental-nutrition-assistance-program.jsp     [employer]  kitchen or free meals  5  [facility] and [place] [facility] [address] - [facility] [place] [facility] - [employer] Distance: 0.64 miles      In-Person   [address] [place] [address]  Language: English  Hours: Mon - Sun 4:30 PM - 5:30 PM  Fees: Free   Phone: (467) 246-5699 Email: info@[employer] Website: https://www.[employer]/locations/[facility]/     6  [facility] - [place] Distance: 1.16 miles      [address] [address]  Language: English  Hours: Mon - Fri 5:00 PM - 6:00 PM  Fees: Free   Phone: (866) 600-3099 Email: fransico@[employer] Website: http://www.[employer]/          Important Numbers & Websites       Emergency Services   [address] [address]  Poison Control   (858) 457-1503  Suicide Prevention Lifeline   (429) 377-2949 (TALK)  Child Abuse Hotline   (232) 205-6599 (4-A-Child)  Sexual Assault Hotline   (359) 276-7890 (HOPE)  National Runaway Safeline   (692) 270-5648 (RUNAWAY)  All-Options Talkline   (545) 596-4655  Substance Abuse Referral   (143) 645-8985 (HELP)

## 2023-09-30 LAB — STFR SERPL-MCNC: 5.6 MG/L

## 2023-10-02 ENCOUNTER — TELEPHONE (OUTPATIENT)
Dept: FAMILY MEDICINE | Facility: CLINIC | Age: 58
End: 2023-10-02
Payer: COMMERCIAL

## 2023-10-02 RX ORDER — FERROUS SULFATE 325(65) MG
325 TABLET ORAL
Qty: 90 TABLET | Refills: 3 | Status: SHIPPED | OUTPATIENT
Start: 2023-10-02 | End: 2024-01-31

## 2023-10-02 NOTE — TELEPHONE ENCOUNTER
Called and informed patient of [email or web address] message sent by Dr. Keane. Mailed FIT test and scheduled for two week lab only     ----- Message from Jaime Keane MD sent at 10/2/2023  9:30 AM CDT -----  Please contact patient and schedule lab only test for 2weeks I will put in orders.  Also, mail her a FIT test

## 2023-10-10 ENCOUNTER — TELEPHONE (OUTPATIENT)
Dept: FAMILY MEDICINE | Facility: CLINIC | Age: 58
End: 2023-10-10

## 2023-10-10 NOTE — TELEPHONE ENCOUNTER
Patient Quality Outreach    Patient is due for the following:   Breast Cancer Screening - Mammogram    Next Steps:   Schedule a Annual Wellness Visit    Type of outreach:    Sent [email or web address] message.      Questions for provider review:    None           Andrea Behrend

## 2023-10-18 ENCOUNTER — MEDICAL CORRESPONDENCE (OUTPATIENT)
Dept: HEALTH INFORMATION MANAGEMENT | Facility: CLINIC | Age: 58
End: 2023-10-18

## 2023-10-18 ENCOUNTER — TRANSCRIBE ORDERS (OUTPATIENT)
Dept: OTHER | Age: 58
End: 2023-10-18

## 2023-10-18 DIAGNOSIS — D68.59 HYPERCOAGULABLE STATE (H): Primary | ICD-10-CM

## 2023-10-20 ENCOUNTER — HOSPITAL ENCOUNTER (OUTPATIENT)
Dept: CARDIOLOGY | Facility: CLINIC | Age: 58
Discharge: HOME | End: 2023-10-20
Attending: STUDENT IN AN ORGANIZED HEALTH CARE EDUCATION/TRAINING PROGRAM | Admitting: STUDENT IN AN ORGANIZED HEALTH CARE EDUCATION/TRAINING PROGRAM
Payer: COMMERCIAL

## 2023-10-20 DIAGNOSIS — R94.31 ABNORMAL ELECTROCARDIOGRAM (ECG) (EKG): ICD-10-CM

## 2023-10-20 DIAGNOSIS — M79.89 LEFT LEG SWELLING: ICD-10-CM

## 2023-10-20 LAB — LVEF ECHO: NORMAL

## 2023-10-20 PROCEDURE — 93306 TTE W/DOPPLER COMPLETE: CPT

## 2023-10-20 PROCEDURE — 93306 TTE W/DOPPLER COMPLETE: CPT | Mod: 26 | Performed by: INTERNAL MEDICINE

## 2023-10-24 ENCOUNTER — LAB (OUTPATIENT)
Dept: LAB | Facility: CLINIC | Age: 58
End: 2023-10-24
Payer: COMMERCIAL

## 2023-10-24 DIAGNOSIS — D64.9 ANEMIA, UNSPECIFIED TYPE: ICD-10-CM

## 2023-10-24 LAB
ERYTHROCYTE [DISTWIDTH] IN BLOOD BY AUTOMATED COUNT: 13.9 % (ref 10–15)
HCT VFR BLD AUTO: 32.7 % (ref 35–47)
HGB BLD-MCNC: 10.1 G/DL (ref 11.7–15.7)
MCH RBC QN AUTO: 27.5 PG (ref 26.5–33)
MCHC RBC AUTO-ENTMCNC: 30.9 G/DL (ref 31.5–36.5)
MCV RBC AUTO: 89 FL (ref 78–100)
PLATELET # BLD AUTO: 172 10E3/UL (ref 150–450)
RBC # BLD AUTO: 3.67 10E6/UL (ref 3.8–5.2)
WBC # BLD AUTO: 3.6 10E3/UL (ref 4–11)

## 2023-10-24 PROCEDURE — 85027 COMPLETE CBC AUTOMATED: CPT

## 2023-10-24 PROCEDURE — 36415 COLL VENOUS BLD VENIPUNCTURE: CPT

## 2023-10-25 ENCOUNTER — OFFICE VISIT (OUTPATIENT)
Dept: CARDIOLOGY | Facility: CLINIC | Age: 58
End: 2023-10-25
Attending: STUDENT IN AN ORGANIZED HEALTH CARE EDUCATION/TRAINING PROGRAM
Payer: COMMERCIAL

## 2023-10-25 ENCOUNTER — TRANSFERRED RECORDS (OUTPATIENT)
Dept: HEALTH INFORMATION MANAGEMENT | Facility: CLINIC | Age: 58
End: 2023-10-25

## 2023-10-25 VITALS
HEART RATE: 61 BPM | WEIGHT: 240.1 LBS | DIASTOLIC BLOOD PRESSURE: 80 MMHG | HEIGHT: 62 IN | OXYGEN SATURATION: 100 % | SYSTOLIC BLOOD PRESSURE: 128 MMHG | BODY MASS INDEX: 44.18 KG/M2

## 2023-10-25 DIAGNOSIS — I26.92 ACUTE SADDLE PULMONARY EMBOLISM, UNSPECIFIED WHETHER ACUTE COR PULMONALE PRESENT (H): ICD-10-CM

## 2023-10-25 PROCEDURE — 93000 ELECTROCARDIOGRAM COMPLETE: CPT | Performed by: INTERNAL MEDICINE

## 2023-10-25 PROCEDURE — 99205 OFFICE O/P NEW HI 60 MIN: CPT | Mod: 25 | Performed by: INTERNAL MEDICINE

## 2023-10-25 NOTE — PROGRESS NOTES
Cardiology Clinic Consultation:    October 25, 2023   Patient Name: Sridevi Centeno  Patient MRN: [id number]    Consult indication: PE    HPI:    I had the opportunity to see patient Sridevi Centeno in cardiology clinic for a consultation. Patient is followed by our colleague Jaime Keane MD with Primary Care.     As you know patient is a pleasant 58-year-old female with a past medical history significant for depression, anxiety, chronic pain syndrome, obesity, hypertension, recent hospitalization for submassive PE, who [facility].    Prior cardiac history notable for admission through [facility] 5/8/2023 for altered mental status, atypical chest pain, found to have anterolateral T wave inversions on ECG with mildly elevated troponin.  Ultimately patient was assessed with a coronary angiogram 5/9/2023 that demonstrated angiographically normal coronary arteries.  Notably ECG report at that time reported QTc 534 ms.    Following this patient had been in her usual state of health until she developed severe chest pain and syncope.  She was seen in the ED 9/8/2023 and ultimately was found to have saddle PE for which she underwent urgent thrombectomy with removal of large clot burden.  TTE demonstrated moderate to severe RV enlargement and moderate to severe RV dysfunction, estimated RVSP 57 mmHg.  She was started on apixaban.  She denies any preceding lower extremity swelling, injury, prolonged immobility, no recent travel.  ECG 9/8/2023 reported normal sinus rhythm with QTc 520 ms, ECG 9/9/2023 reported QTc 521 ms, ECG 9/12/2023 reported QTc 499 ms.  She was seen by Dr. Oconnell with cardiology, and was recommended to follow-up with the EP, since prior ECG from 5/2023 also demonstrated prolonged QT.      Since then patient reports that overall she has been doing well.  She denies any chest pain, chest pressure.  Follow-up TTE 10/20/2023 demonstrated LVEF 60 to 65%, normal RV size and function, RVSP could  not be estimated.  Study reviewed personally and concur with the reported findings.  ECG today in clinic demonstrated normal sinus rhythm with QTc 435 ms.    Patient has anxiety and depression, as well as chronic pain.  She is on duloxetine, pregabalin, and narcotics.  She has been taking apixaban 5 mg twice daily.  She was scheduled for spine surgery however this was rescheduled by her spine surgery team pending further evaluation, she was referred to Hematology.    She reports that her father suffered a PE (details unclear), also has other family members with history of VTE.  No family history of sudden cardiac death or arrhythmia.    Assessment and Plan/Recommendations:    # Submassive saddle PE s/p mechanical thrombectomy 9/8/2023 at [facility].  Unprovoked.  Initial TTE demonstrated moderate to severe RV dilatation with moderate to severe RV dysfunction.  Follow-up TTE 10/20/2023 demonstrated normal RV size and function.  # Prolonged QT, has been borderline prolonged for years, on review of ECGs through Care Everywhere, ECG report through [facility] 6/15/2017 QTc 469 ms.  QTc 521 ms in the setting of acute PE, ECG today in clinic demonstrates QTc 435 ms.  She is on pregabalin which can prolong QT interval, also QT prolongation has been demonstrated in the setting of acute PE.    - Patient has recovered well from hospitalization for submassive PE, with recent echocardiogram demonstrating resolution of RV dilatation and dysfunction  - Agree with Hematology consultation for further evaluation of hypercoagulable state, discussed that weighing risks versus benefits, given the unprovoked nature of this submassive PE, indefinite anticoagulation may be recommended  - Recommend avoiding QT prolonging medications as able    Thank you for allowing our team to participate in the care of Sridevi Centeno.  Please do not hesitate to call or page me with any questions or concerns.    Sincerely,     Johnathan Kelly MD, [facility]  [place]  Cardiology  October 25, 2023      Mirna Arzate MD  [address] [place] [address]      Total time spent on this encounter today: 62 minutes, providing care in this encounter including, but not limited to, reviewing prior medical records, laboratory data, imaging studies, diagnostic studies, procedure notes, formulating an assessment and plan, recommendations, discussion and counseling with patient face to face, dictation.    Past Medical History:     Patient Active Problem List   Diagnosis    Hypokalemia    Hematuria    Urge Incontinence Of Urine    Chronic Constipation    Cervical Spine Stenosis    Generalized anxiety disorder    Patellofemoral Syndrome Of The Left Knee    Hypercholesteremia    Major Depression, Recurrent    Hypertension    Generalized Osteoarthritis    Cervical Disc Degeneration    Lumbar Radiculopathy    Bladder wall thickening    Hydronephrosis, right    Hypomagnesemia    Brachial neuritis or radiculitis NOS    Health care maintenance    Chronic pain    Osteoarthritis of knees, bilateral    Edema    Healthcare maintenance    Chronic patellofemoral pain of both knees    Morbid obesity (H)    Impairment of balance    Prinzmetal's angina (H24)    Bilateral lower extremity edema    Snoring    Wheezing    Spinal stenosis of lumbar region, unspecified whether neurogenic claudication present    Anemia, unspecified type    Acute saddle pulmonary embolism, unspecified whether acute cor pulmonale present (H)    Prolonged QT interval syndrome    Abnormal electrocardiogram (ECG) (EKG)       Past Surgical History:   Past Surgical History:   Procedure Laterality Date    ANTERIOR FUSION CERVICAL SPINE  05/2018    C2 TO C 5 WITH CORPECTOMY C3-4, POSTERIOR C2-C6    HYSTERECTOMY  1993    IR CERVICAL EPIDURAL STEROID INJECTION  8/13/2003    IR CERVICAL EPIDURAL STEROID INJECTION  8/28/2003    IR CERVICAL EPIDURAL STEROID INJECTION  11/3/2003    IR CERVICAL EPIDURAL STEROID INJECTION  8/24/2004     IR CERVICAL EPIDURAL STEROID INJECTION  12/7/2004    IR CERVICAL EPIDURAL STEROID INJECTION  9/6/2005    IR CERVICAL EPIDURAL STEROID INJECTION  4/11/2006    IR CERVICAL EPIDURAL STEROID INJECTION  2/6/2013    IR CERVICAL EPIDURAL STEROID INJECTION  5/10/2013    IR LUMBAR EPIDURAL STEROID INJECTION  12/18/2012    OOPHORECTOMY Bilateral 1993    ORIF WRIST FRACTURE Right 1998    [facility] APPENDECTOMY      Description: Appendectomy;  Recorded: 11/01/2011;    [facility] CERV SPINE FUSN,ANTER,BELOW C2  1992    Description: Cervical Vertebral Fusion;  Recorded: 11/01/2011;    [facility] TOTAL ABDOM HYSTERECTOMY      Description: Total Abdominal Hysterectomy;  Proc Date: 01/01/1993;       Medications (outpatient):  Current Outpatient Medications   Medication Sig Dispense Refill    albuterol (PROAIR HFA/PROVENTIL HFA/VENTOLIN HFA) 108 (90 Base) MCG/ACT inhaler Inhale 2 puffs by mouth into the lungs every 6 hours 18 g 0    apixaban ANTICOAGULANT (ELIQUIS ANTICOAGULANT) 5 MG tablet Take 1 tablet (5 mg) by mouth 2 times daily 90 tablet 1    aspirin-acetaminophen-caffeine (EXCEDRIN MIGRAINE) 250-250-65 MG tablet Take 1 tablet by mouth daily as needed for headaches      DULoxetine (CYMBALTA) 30 MG capsule Take 1 capsule (30 mg) by mouth 2 times daily 30 capsule 3    ferrous sulfate (FEROSUL) 325 (65 Fe) MG tablet Take 1 tablet (325 mg) by mouth every other day 90 tablet 1    oxyCODONE-acetaminophen (PERCOCET)  MG per tablet Take 1 tablet by mouth every 4 hours as needed for severe pain      OXYCONTIN 10 MG 12 hr tablet TAKE ONE TABLET BY MOUTH TWICE A DAY AS DIRECTED      Pregabalin (LYRICA) 200 MG capsule       ferrous sulfate (FEROSUL) 325 (65 Fe) MG tablet Take 1 tablet (325 mg) by mouth daily (with breakfast) (Patient not taking: Reported on 10/25/2023) 90 tablet 3    ferrous sulfate (FEROSUL) 325 (65 Fe) MG tablet Take 1 tablet (325 mg) by mouth daily (with breakfast) (Patient not taking: Reported on 10/25/2023) 90 tablet 3    oxyCODONE  "IR (ROXICODONE) 10 MG tablet Take 10 mg by mouth every 4 hours as needed for severe pain (Patient not taking: Reported on 10/25/2023)         Allergies:  Allergies   Allergen Reactions    Hydrocodone-Acetaminophen GI Disturbance and Nausea    Ibuprofen Nausea       Social History:   History   Drug Use No      History   Smoking Status    Former    Types: Cigarettes   Smokeless Tobacco    Never     Social History    Substance and Sexual Activity      Alcohol use: Never       Family History:  Family History   Problem Relation Age of Onset    No Known Problems Mother     No Known Problems Father     No Known Problems Sister     No Known Problems Daughter     No Known Problems Maternal Grandmother     No Known Problems Maternal Grandfather     No Known Problems Paternal Grandmother     No Known Problems Paternal Grandfather     No Known Problems Maternal Aunt     No Known Problems Paternal Aunt     Hereditary Breast and Ovarian Cancer Syndrome No family hx of     Breast Cancer No family hx of     Cancer No family hx of     Colon Cancer No family hx of     Endometrial Cancer No family hx of     Ovarian Cancer No family hx of        Review of Systems:   A comprehensive 12 system review of systems was carried out.  Pertinent positives and negatives are noted above. Otherwise negative for contributory information.    Objective & Physical Exam:  /80   Pulse 61   Ht 1.575 m (5' 2\")   Wt 108.9 kg (240 lb 1.6 oz)   LMP  (LMP Unknown)   SpO2 100%   BMI 43.91 kg/m    Wt Readings from Last 2 Encounters:   10/25/23 108.9 kg (240 lb 1.6 oz)   09/28/23 109 kg (240 lb 4 oz)     Body mass index is 43.91 kg/m .   Body surface area is 2.18 meters squared.    Constitutional: appears stated age, in no apparent distress, appears to be well nourished  Head: normocephalic, atraumatic  Neck: supple, trachea midline  Pulmonary: clear to auscultation bilaterally, no wheezes, no rales, no increased work of breathing  Cardiovascular: " JVP normal, regular rate, regular rhythm, normal S1 and S2, no S3, S4, no murmur appreciated, no lower extremity edema  Gastrointestinal: no guarding, non-rigid   Neurologic: awake, alert, moves all extremities  Skin: no jaundice, warm on limited exam  Psychiatric: affect is normal, answers questions appropriately, oriented to self and place    Data reviewed:  Lab Results   Component Value Date    WBC 3.6 (L) 10/24/2023    RBC 3.67 (L) 10/24/2023    HGB 10.1 (L) 10/24/2023    HCT 32.7 (L) 10/24/2023    MCV 89 10/24/2023    MCH 27.5 10/24/2023    MCHC 30.9 (L) 10/24/2023    RDW 13.9 10/24/2023     10/24/2023     Sodium   Date Value Ref Range Status   09/03/2021 142 136 - 145 mmol/L Final     Potassium   Date Value Ref Range Status   09/03/2021 3.9 3.5 - 5.0 mmol/L Final     Chloride   Date Value Ref Range Status   09/03/2021 104 98 - 107 mmol/L Final     Carbon Dioxide (CO2)   Date Value Ref Range Status   09/03/2021 26 22 - 31 mmol/L Final     Anion Gap   Date Value Ref Range Status   09/03/2021 12 5 - 18 mmol/L Final     Glucose   Date Value Ref Range Status   09/03/2021 73 70 - 125 mg/dL Final     Urea Nitrogen   Date Value Ref Range Status   09/03/2021 14 8 - 22 mg/dL Final     Creatinine   Date Value Ref Range Status   09/03/2021 0.72 0.60 - 1.10 mg/dL Final     GFR Estimate   Date Value Ref Range Status   09/03/2021 >90 >60 mL/min/1.73m2 Final     Comment:     As of July 11, 2021, eGFR is calculated by the CKD-EPI creatinine equation, without race adjustment. eGFR can be influenced by muscle mass, exercise, and diet. The reported eGFR is an estimation only and is only applicable if the renal function is stable.   12/01/2020 59 (L) >60 mL/min/1.73m2 Final     Calcium   Date Value Ref Range Status   09/03/2021 9.5 8.5 - 10.5 mg/dL Final     Bilirubin Total   Date Value Ref Range Status   09/03/2021 0.2 0.0 - 1.0 mg/dL Final     Alkaline Phosphatase   Date Value Ref Range Status   09/03/2021 80 45 - 120 U/L  Final     ALT   Date Value Ref Range Status   2021 11 0 - 45 U/L Final     AST   Date Value Ref Range Status   2021 19 0 - 40 U/L Final     Recent Labs   Lab Test 23  1427 20  1738 17  1055 16  1029   CHOL 194 200*   < >  --    HDL 68 63   < >  --    *  --   --  132*   TRIG 112  --   --   --     < > = values in this interval not displayed.      Lab Results   Component Value Date    A1C 5.2 2010        Recent Results (from the past 4320 hour(s))   Echocardiogram Complete   Result Value    LVEF  60-65%    Narrative    [id number]  [id number]  [id number]  [id number]^INGA^JOSE RAMON     [facility]  Echocardiography Laboratory  [address] [place] [address]     Name: CARLINE SHOOK  MRN: [id number]  : 1965  Study Date: 10/20/2023 09:52 AM  Age: 58 yrs  Gender: Female  Patient Location: [facility]  Reason For Study: Left leg swelling, Abnormal electrocardiogram (ECG) (EKG)  Ordering Physician: JOSE RAMON XIONG  Referring Physician: JOSE RAMON XIONG  Performed By: Johnie Summers RDCS     BSA: 2.0 m2  Height: 62 in  Weight: 210 lb  HR: 60  BP: 146/93 mmHg  ______________________________________________________________________________  Procedure  Complete Echo Adult.  ______________________________________________________________________________  Interpretation Summary     The right ventricle is normal in structure, function and size.  Left ventricular systolic function is normal.  The visual ejection fraction is 60-65%.  The left ventricle is normal in size.  Doppler interrogation does not demonstrate signficant stenosis or  insufficiency involving cardiac valves.     No old studies for review.  ______________________________________________________________________________  Left Ventricle  The left ventricle is normal in size. There is normal left ventricular wall  thickness. Left ventricular systolic function is normal. The visual ejection  fraction is 60-65%. Left  ventricular diastolic function is normal. No regional  wall motion abnormalities noted. There is no thrombus seen in the left  ventricle.     Right Ventricle  The right ventricle is normal in structure, function and size. There is no  mass or thrombus in the right ventricle.     Atria  Normal left atrial size. Right atrial size is normal. There is no atrial shunt  seen. The left atrial appendage is not well visualized.     Mitral Valve  The mitral valve leaflets appear normal. There is no evidence of stenosis,  fluttering, or prolapse. There is no mitral regurgitation noted. There is no  mitral valve stenosis.     Tricuspid Valve  Normal tricuspid valve. No tricuspid regurgitation. Right ventricular systolic  pressure could not be approximated due to inadequate tricuspid regurgitation.  There is no tricuspid stenosis.     Aortic Valve  The aortic valve is trileaflet. No aortic regurgitation is present. No aortic  stenosis is present.     Pulmonic Valve  Normal pulmonic valve. There is no pulmonic valvular regurgitation. There is  no pulmonic valvular stenosis.     Vessels  The aortic root is normal size. Normal size ascending aorta. Dilation of the  inferior vena cava is present with normal respiratory variation in diameter.  The pulmonary artery is normal size.     Pericardium  The pericardium appears normal. There is no pleural effusion.     Rhythm  Sinus rhythm was noted.  ______________________________________________________________________________  MMode/2D Measurements & Calculations  IVSd: 0.98 cm     LVIDd: 5.3 cm  LVIDs: 3.4 cm  LVPWd: 1.1 cm  IVC diam: 2.1 cm  FS: 36.4 %  LV mass(C)d: 207.9 grams  LV mass(C)dI: 106.5 grams/m2  Ao root diam: 3.1 cm  LA dimension: 4.3 cm  asc Aorta Diam: 3.5 cm  LA/Ao: 1.4  Ao root diam index Ht(cm/m): 2.0  Ao root diam index BSA (cm/m2): 1.6  Asc Ao diam index BSA (cm/m2): 1.8  Asc Ao diam index Ht(cm/m): 2.2     LA Volume Index (BP): 55.4 ml/m2  RWT: 0.41     Doppler  Measurements & Calculations  MV E max jimenez: 57.8 cm/sec  MV A max jimenez: 63.7 cm/sec  MV E/A: 0.91  MV dec slope: 220.6 cm/sec2  MV dec time: 0.26 sec     ______________________________________________________________________________  Report approved by: Dr. Pako Dent 10/20/2023 11:49 AM

## 2023-10-25 NOTE — PATIENT INSTRUCTIONS
October 25, 2023    Thank you for allowing our Cardiology team to participate in your care.     Please note the following changes to your heart treatment plan:     Medication changes:   - none    Tests to be done:  - none    Follow up:  - Follow up as needed      For scheduling, please call [phone].      Please contact our team through [employer] or our Nurse Team Voicemail service [phone], or the General Clinic [phone] for any questions or concerns.     If you are having a medical emergency, please call [phone].     Sincerely,    Johnathan Kelly MD, FACC  Cardiology    [facility] - [facility] - [facility] [place]

## 2023-10-25 NOTE — LETTER
10/25/2023    Jaime Keane MD  [address] [place] [id number]    RE: Sridevi Centeno       Dear Colleague,     I had the pleasure of seeing Sridevi Centeno in the [facility].  Cardiology Clinic Consultation:    October 25, 2023   Patient Name: Sridevi Centeno  Patient MRN: [id number]    Consult indication: PE    HPI:    I had the opportunity to see patient Sridevi Centeno in cardiology clinic for a consultation. Patient is followed by our colleague Jaime Keane MD with Primary Care.     As you know patient is a pleasant 58-year-old female with a past medical history significant for depression, anxiety, chronic pain syndrome, obesity, hypertension, recent hospitalization for submassive PE, who presents to [facility] care.    Prior cardiac history notable for admission through [facility] 5/8/2023 for altered mental status, atypical chest pain, found to have anterolateral T wave inversions on ECG with mildly elevated troponin.  Ultimately patient was assessed with a coronary angiogram 5/9/2023 that demonstrated angiographically normal coronary arteries.  Notably ECG report at that time reported QTc 534 ms.    Following this patient had been in her usual state of health until she developed severe chest pain and syncope.  She was seen in the ED 9/8/2023 and ultimately was found to have saddle PE for which she underwent urgent thrombectomy with removal of large clot burden.  TTE demonstrated moderate to severe RV enlargement and moderate to severe RV dysfunction, estimated RVSP 57 mmHg.  She was started on apixaban.  She denies any preceding lower extremity swelling, injury, prolonged immobility, no recent travel.  ECG 9/8/2023 reported normal sinus rhythm with QTc 520 ms, ECG 9/9/2023 reported QTc 521 ms, ECG 9/12/2023 reported QTc 499 ms.  She was seen by Dr. Oconnell with cardiology, and was recommended to follow-up with the EP, since prior ECG from 5/2023 also demonstrated prolonged QT.       Since then patient reports that overall she has been doing well.  She denies any chest pain, chest pressure.  Follow-up TTE 10/20/2023 demonstrated LVEF 60 to 65%, normal RV size and function, RVSP could not be estimated.  Study reviewed personally and concur with the reported findings.  ECG today in clinic demonstrated normal sinus rhythm with QTc 435 ms.    Patient has anxiety and depression, as well as chronic pain.  She is on duloxetine, pregabalin, and narcotics.  She has been taking apixaban 5 mg twice daily.  She was scheduled for spine surgery however this was rescheduled by her spine surgery team pending further evaluation, she was referred to Hematology.    She reports that her father suffered a PE (details unclear), also has other family members with history of VTE.  No family history of sudden cardiac death or arrhythmia.    Assessment and Plan/Recommendations:    # Submassive saddle PE s/p mechanical thrombectomy 9/8/2023 at [facility].  Unprovoked.  Initial TTE demonstrated moderate to severe RV dilatation with moderate to severe RV dysfunction.  Follow-up TTE 10/20/2023 demonstrated normal RV size and function.  # Prolonged QT, has been borderline prolonged for years, on review of ECGs through Care Everywhere, ECG report through [facility] 6/15/2017 QTc 469 ms.  QTc 521 ms in the setting of acute PE, ECG today in clinic demonstrates QTc 435 ms.  She is on pregabalin which can prolong QT interval, also QT prolongation has been demonstrated in the setting of acute PE.    - Patient has recovered well from hospitalization for submassive PE, with recent echocardiogram demonstrating resolution of RV dilatation and dysfunction  - Agree with Hematology consultation for further evaluation of hypercoagulable state, discussed that weighing risks versus benefits, given the unprovoked nature of this submassive PE, indefinite anticoagulation may be recommended  - Recommend avoiding QT prolonging medications as  able    Thank you for allowing our team to participate in the care of Sridevi Centeno.  Please do not hesitate to call or page me with any questions or concerns.    Sincerely,     Johnathan Kelly MD, [facility]  Cardiology  October 25, 2023      Mirna Arzate MD  [address] [place] [address]      Total time spent on this encounter today: 62 minutes, providing care in this encounter including, but not limited to, reviewing prior medical records, laboratory data, imaging studies, diagnostic studies, procedure notes, formulating an assessment and plan, recommendations, discussion and counseling with patient face to face, dictation.    Past Medical History:     Patient Active Problem List   Diagnosis    Hypokalemia    Hematuria    Urge Incontinence Of Urine    Chronic Constipation    Cervical Spine Stenosis    Generalized anxiety disorder    Patellofemoral Syndrome Of The Left Knee    Hypercholesteremia    Major Depression, Recurrent    Hypertension    Generalized Osteoarthritis    Cervical Disc Degeneration    Lumbar Radiculopathy    Bladder wall thickening    Hydronephrosis, right    Hypomagnesemia    Brachial neuritis or radiculitis NOS    Health care maintenance    Chronic pain    Osteoarthritis of knees, bilateral    Edema    Healthcare maintenance    Chronic patellofemoral pain of both knees    Morbid obesity (H)    Impairment of balance    Prinzmetal's angina (H24)    Bilateral lower extremity edema    Snoring    Wheezing    Spinal stenosis of lumbar region, unspecified whether neurogenic claudication present    Anemia, unspecified type    Acute saddle pulmonary embolism, unspecified whether acute cor pulmonale present (H)    Prolonged QT interval syndrome    Abnormal electrocardiogram (ECG) (EKG)       Past Surgical History:   Past Surgical History:   Procedure Laterality Date    ANTERIOR FUSION CERVICAL SPINE  05/2018    C2 TO C 5 WITH CORPECTOMY C3-4, POSTERIOR C2-C6    HYSTERECTOMY  1993     IR CERVICAL EPIDURAL STEROID INJECTION  8/13/2003    IR CERVICAL EPIDURAL STEROID INJECTION  8/28/2003    IR CERVICAL EPIDURAL STEROID INJECTION  11/3/2003    IR CERVICAL EPIDURAL STEROID INJECTION  8/24/2004    IR CERVICAL EPIDURAL STEROID INJECTION  12/7/2004    IR CERVICAL EPIDURAL STEROID INJECTION  9/6/2005    IR CERVICAL EPIDURAL STEROID INJECTION  4/11/2006    IR CERVICAL EPIDURAL STEROID INJECTION  2/6/2013    IR CERVICAL EPIDURAL STEROID INJECTION  5/10/2013    IR LUMBAR EPIDURAL STEROID INJECTION  12/18/2012    OOPHORECTOMY Bilateral 1993    ORIF WRIST FRACTURE Right 1998    [facility] APPENDECTOMY      Description: Appendectomy;  Recorded: 11/01/2011;    [facility] CERV SPINE FUSN,ANTER,BELOW C2  1992    Description: Cervical Vertebral Fusion;  Recorded: 11/01/2011;    [facility] TOTAL ABDOM HYSTERECTOMY      Description: Total Abdominal Hysterectomy;  Proc Date: 01/01/1993;       Medications (outpatient):  Current Outpatient Medications   Medication Sig Dispense Refill    albuterol (PROAIR HFA/PROVENTIL HFA/VENTOLIN HFA) 108 (90 Base) MCG/ACT inhaler Inhale 2 puffs by mouth into the lungs every 6 hours 18 g 0    apixaban ANTICOAGULANT (ELIQUIS ANTICOAGULANT) 5 MG tablet Take 1 tablet (5 mg) by mouth 2 times daily 90 tablet 1    aspirin-acetaminophen-caffeine (EXCEDRIN MIGRAINE) 250-250-65 MG tablet Take 1 tablet by mouth daily as needed for headaches      DULoxetine (CYMBALTA) 30 MG capsule Take 1 capsule (30 mg) by mouth 2 times daily 30 capsule 3    ferrous sulfate (FEROSUL) 325 (65 Fe) MG tablet Take 1 tablet (325 mg) by mouth every other day 90 tablet 1    oxyCODONE-acetaminophen (PERCOCET)  MG per tablet Take 1 tablet by mouth every 4 hours as needed for severe pain      OXYCONTIN 10 MG 12 hr tablet TAKE ONE TABLET BY MOUTH TWICE A DAY AS DIRECTED      Pregabalin (LYRICA) 200 MG capsule       ferrous sulfate (FEROSUL) 325 (65 Fe) MG tablet Take 1 tablet (325 mg) by mouth daily (with breakfast) (Patient not taking:  "Reported on 10/25/2023) 90 tablet 3    ferrous sulfate (FEROSUL) 325 (65 Fe) MG tablet Take 1 tablet (325 mg) by mouth daily (with breakfast) (Patient not taking: Reported on 10/25/2023) 90 tablet 3    oxyCODONE IR (ROXICODONE) 10 MG tablet Take 10 mg by mouth every 4 hours as needed for severe pain (Patient not taking: Reported on 10/25/2023)         Allergies:  Allergies   Allergen Reactions    Hydrocodone-Acetaminophen GI Disturbance and Nausea    Ibuprofen Nausea       Social History:   History   Drug Use No      History   Smoking Status    Former    Types: Cigarettes   Smokeless Tobacco    Never     Social History    Substance and Sexual Activity      Alcohol use: Never       Family History:  Family History   Problem Relation Age of Onset    No Known Problems Mother     No Known Problems Father     No Known Problems Sister     No Known Problems Daughter     No Known Problems Maternal Grandmother     No Known Problems Maternal Grandfather     No Known Problems Paternal Grandmother     No Known Problems Paternal Grandfather     No Known Problems Maternal Aunt     No Known Problems Paternal Aunt     Hereditary Breast and Ovarian Cancer Syndrome No family hx of     Breast Cancer No family hx of     Cancer No family hx of     Colon Cancer No family hx of     Endometrial Cancer No family hx of     Ovarian Cancer No family hx of        Review of Systems:   A comprehensive 12 system review of systems was carried out.  Pertinent positives and negatives are noted above. Otherwise negative for contributory information.    Objective & Physical Exam:  /80   Pulse 61   Ht 1.575 m (5' 2\")   Wt 108.9 kg (240 lb 1.6 oz)   LMP  (LMP Unknown)   SpO2 100%   BMI 43.91 kg/m    Wt Readings from Last 2 Encounters:   10/25/23 108.9 kg (240 lb 1.6 oz)   09/28/23 109 kg (240 lb 4 oz)     Body mass index is 43.91 kg/m .   Body surface area is 2.18 meters squared.    Constitutional: appears stated age, in no apparent distress, " appears to be well nourished  Head: normocephalic, atraumatic  Neck: supple, trachea midline  Pulmonary: clear to auscultation bilaterally, no wheezes, no rales, no increased work of breathing  Cardiovascular: JVP normal, regular rate, regular rhythm, normal S1 and S2, no S3, S4, no murmur appreciated, no lower extremity edema  Gastrointestinal: no guarding, non-rigid   Neurologic: awake, alert, moves all extremities  Skin: no jaundice, warm on limited exam  Psychiatric: affect is normal, answers questions appropriately, oriented to self and place    Data reviewed:  Lab Results   Component Value Date    WBC 3.6 (L) 10/24/2023    RBC 3.67 (L) 10/24/2023    HGB 10.1 (L) 10/24/2023    HCT 32.7 (L) 10/24/2023    MCV 89 10/24/2023    MCH 27.5 10/24/2023    MCHC 30.9 (L) 10/24/2023    RDW 13.9 10/24/2023     10/24/2023     Sodium   Date Value Ref Range Status   09/03/2021 142 136 - 145 mmol/L Final     Potassium   Date Value Ref Range Status   09/03/2021 3.9 3.5 - 5.0 mmol/L Final     Chloride   Date Value Ref Range Status   09/03/2021 104 98 - 107 mmol/L Final     Carbon Dioxide (CO2)   Date Value Ref Range Status   09/03/2021 26 22 - 31 mmol/L Final     Anion Gap   Date Value Ref Range Status   09/03/2021 12 5 - 18 mmol/L Final     Glucose   Date Value Ref Range Status   09/03/2021 73 70 - 125 mg/dL Final     Urea Nitrogen   Date Value Ref Range Status   09/03/2021 14 8 - 22 mg/dL Final     Creatinine   Date Value Ref Range Status   09/03/2021 0.72 0.60 - 1.10 mg/dL Final     GFR Estimate   Date Value Ref Range Status   09/03/2021 >90 >60 mL/min/1.73m2 Final     Comment:     As of July 11, 2021, eGFR is calculated by the CKD-EPI creatinine equation, without race adjustment. eGFR can be influenced by muscle mass, exercise, and diet. The reported eGFR is an estimation only and is only applicable if the renal function is stable.   12/01/2020 59 (L) >60 mL/min/1.73m2 Final     Calcium   Date Value Ref Range Status    2021 9.5 8.5 - 10.5 mg/dL Final     Bilirubin Total   Date Value Ref Range Status   2021 0.2 0.0 - 1.0 mg/dL Final     Alkaline Phosphatase   Date Value Ref Range Status   2021 80 45 - 120 U/L Final     ALT   Date Value Ref Range Status   2021 11 0 - 45 U/L Final     AST   Date Value Ref Range Status   2021 19 0 - 40 U/L Final     Recent Labs   Lab Test 23  1427 20  1738 17  1055 16  1029   CHOL 194 200*   < >  --    HDL 68 63   < >  --    *  --   --  132*   TRIG 112  --   --   --     < > = values in this interval not displayed.      Lab Results   Component Value Date    A1C 5.2 2010        Recent Results (from the past 4320 hour(s))   Echocardiogram Complete   Result Value    LVEF  60-65%    Narrative    [id number]  [id number]  [id number]  [id number]^INGA^JOSE RAMON     [facility]  Echocardiography Laboratory  [address] [place] [address]     Name: CARLINE SHOOK  MRN: [id number]  : 1965  Study Date: 10/20/2023 09:52 AM  Age: 58 yrs  Gender: Female  Patient Location: [facility]  Reason For Study: Left leg swelling, Abnormal electrocardiogram (ECG) (EKG)  Ordering Physician: JOSE RAMON XIONG  Referring Physician: JOSE RAMON XIONG  Performed By: Johnie Summers RDCS     BSA: 2.0 m2  Height: 62 in  Weight: 210 lb  HR: 60  BP: 146/93 mmHg  ______________________________________________________________________________  Procedure  Complete Echo Adult.  ______________________________________________________________________________  Interpretation Summary     The right ventricle is normal in structure, function and size.  Left ventricular systolic function is normal.  The visual ejection fraction is 60-65%.  The left ventricle is normal in size.  Doppler interrogation does not demonstrate signficant stenosis or  insufficiency involving cardiac valves.     No old studies for  review.  ______________________________________________________________________________  Left Ventricle  The left ventricle is normal in size. There is normal left ventricular wall  thickness. Left ventricular systolic function is normal. The visual ejection  fraction is 60-65%. Left ventricular diastolic function is normal. No regional  wall motion abnormalities noted. There is no thrombus seen in the left  ventricle.     Right Ventricle  The right ventricle is normal in structure, function and size. There is no  mass or thrombus in the right ventricle.     Atria  Normal left atrial size. Right atrial size is normal. There is no atrial shunt  seen. The left atrial appendage is not well visualized.     Mitral Valve  The mitral valve leaflets appear normal. There is no evidence of stenosis,  fluttering, or prolapse. There is no mitral regurgitation noted. There is no  mitral valve stenosis.     Tricuspid Valve  Normal tricuspid valve. No tricuspid regurgitation. Right ventricular systolic  pressure could not be approximated due to inadequate tricuspid regurgitation.  There is no tricuspid stenosis.     Aortic Valve  The aortic valve is trileaflet. No aortic regurgitation is present. No aortic  stenosis is present.     Pulmonic Valve  Normal pulmonic valve. There is no pulmonic valvular regurgitation. There is  no pulmonic valvular stenosis.     Vessels  The aortic root is normal size. Normal size ascending aorta. Dilation of the  inferior vena cava is present with normal respiratory variation in diameter.  The pulmonary artery is normal size.     Pericardium  The pericardium appears normal. There is no pleural effusion.     Rhythm  Sinus rhythm was noted.  ______________________________________________________________________________  MMode/2D Measurements & Calculations  IVSd: 0.98 cm     LVIDd: 5.3 cm  LVIDs: 3.4 cm  LVPWd: 1.1 cm  IVC diam: 2.1 cm  FS: 36.4 %  LV mass(C)d: 207.9 grams  LV mass(C)dI: 106.5  grams/m2  Ao root diam: 3.1 cm  LA dimension: 4.3 cm  asc Aorta Diam: 3.5 cm  LA/Ao: 1.4  Ao root diam index Ht(cm/m): 2.0  Ao root diam index BSA (cm/m2): 1.6  Asc Ao diam index BSA (cm/m2): 1.8  Asc Ao diam index Ht(cm/m): 2.2     LA Volume Index (BP): 55.4 ml/m2  RWT: 0.41     Doppler Measurements & Calculations  MV E max jimenez: 57.8 cm/sec  MV A max jimenez: 63.7 cm/sec  MV E/A: 0.91  MV dec slope: 220.6 cm/sec2  MV dec time: 0.26 sec     ______________________________________________________________________________  Report approved by: Dr. Pako Dent 10/20/2023 11:49 AM              Thank you for allowing me to participate in the care of your patient.      Sincerely,     Johnathan Kelly MD     [facility]  cc:   Mirna Arzate MD  [address] [place] [address]

## 2023-11-08 NOTE — PROGRESS NOTES
[facility]  [address] [place] [address] [address], [place] [address]  Main: [phone], Fax: [phone]    Patient seen at: [facility] at [address] [place] [address]    Outpatient Visit Note:    Patient: Sridevi Centeno  MRN: [id number]  : 1965  MARGARITA: 2023    Reason for visit:  Saddle pulmonary embolism on 2023.     HPI:  Sridevi is a 58 year old female with a history of CKD stage 2-3, anxiety, obesity, chronic narcotic dependency, who was found to have a saddle pulmonary embolism on 2023, referred by Dr. Rex Guerin of Neurosurgery at [facility] for consultation.     Sridevi has a history of severe disc degeneration and facet arthritis of the L3-S1. She recalls that back on labor day weekend (2023 through 2023), she was experiencing severe back pain to the point that she was basically entirely bed bound for several days. Then on 2023, she had her first syncopal episode after filling the dog bowl with water. She denies any long distance travelling, traumatic injuries or any hospitalization or surgery in the month of Aug 2023.     Then on 2023, she presented to the emergency department within the [facility] with a 2 days history of tight chest pain and dyspnea on exertion as well as syncopal episodes on 2023 and was brought to the emergency department by EMS. At the time, a D-Dimer was noted to be 12.68 (normal <0.5). CTA chest showed saddle pulmonary embolism with extension into lobar, segmental, and subsegmental arterial branches in the right upper, right middle, and right lower lobes and the left upper lobar arterial branch. It also showed enlarged main pulmonary artery measures 3.7 cm in diameter consistent with right heart strain. She urgently underwent successful bilateral pulmonary angiography and mechanical thrombectomy on 2023. Bilateral lower extremity venous  ultrasound done on 9/9/2023 showed no DVT in either lower extremities. She was transitioned from unfractionated heparin to apixaban on 9/9/2023 and was discharged on 9/10/2023. During her hospitalization, she is also found to have a prolonged QT. Seen by cardiology and planned follow up as an outpatient. At time of discharged, she was placed on 2L of supplemental oxygen.     9/12/2023, she returned to the emergency department after a fall at home. Apparently she ran out of oxygen in the tank and after climbing up a flight of stairs, she passed out. Admitted overnight for observation and was discharged home. Repeat CTA chest was done showing interval clearing of saddle pulmonary embolism with bilateral central and subsegmental pulmonary emboli appearing decreased. Largest main pulmonary artery measuring 4.1 cm.    10/18/2023, she had a visit with Dr. Rex Guerin of Neurosurgery at [facility] for severe back pain and found to have severe disc degeneration and facet arthritis of L3-S1. Dr. Guerin discussed surgical options with the patient but because of her recent pulmonary embolism, she is referred by Dr. Guerin to our clinic for consultation.     10/25/2023, she was seen by Cardiology. They determined that recent echocardiogram shoed resolution of RV dilation and dysfunction and they agree with hematology evaluation. Cardiology determined that her pulmonary embolic event was an unprovoked event.     Currently, Sridevi reports that she is on apixaban at 5 mg PO BID dosing. She denies any bleeding issues. Today, she is no longer on oxygen supplementation. She denies any shortness of breath today or any chest pain. She denies any lower extremity swelling or pain.     Sridevi has never had a colonoscopy. I do note that she has been anemic for at least the past 5 years. Her primary care provider, Dr. Keane, has recently started her on oral iron replacement therapy and also ordered a Cologuard test.      ROS:  Denies any bleeding complications. Specifically, no frequent epistaxis. No issues with oral mucosal bleeding. Denies any hematuria or blood in stools. Denies any shortness of breath. No chest pain. No cough. No fever.    Medications:  Current Outpatient Medications   Medication    albuterol (PROAIR HFA/PROVENTIL HFA/VENTOLIN HFA) 108 (90 Base) MCG/ACT inhaler    apixaban ANTICOAGULANT (ELIQUIS ANTICOAGULANT) 5 MG tablet    aspirin-acetaminophen-caffeine (EXCEDRIN MIGRAINE) 250-250-65 MG tablet    DULoxetine (CYMBALTA) 30 MG capsule    ferrous sulfate (FEROSUL) 325 (65 Fe) MG tablet    ferrous sulfate (FEROSUL) 325 (65 Fe) MG tablet    ferrous sulfate (FEROSUL) 325 (65 Fe) MG tablet    oxyCODONE IR (ROXICODONE) 10 MG tablet    oxyCODONE-acetaminophen (PERCOCET)  MG per tablet    OXYCONTIN 10 MG 12 hr tablet    Pregabalin (LYRICA) 200 MG capsule     No current facility-administered medications for this visit.     Allergies:  Allergies   Allergen Reactions    Hydrocodone-Acetaminophen GI Disturbance and Nausea    Ibuprofen Nausea     PmHx:  Past Medical History:   Diagnosis Date    Anxiety disorder     Arthritis     Bladder wall thickening     Brachial neuritis or radiculitis     Cervical spinal stenosis     Chronic pain     Constipation     Degenerative cervical disc     Edema     Generalized anxiety disorder     Hematuria     Hydronephrosis     right    Hypercholesteremia     Hypercholesteremia     Hypertension     Hypertension     Hypokalemia     Hypomagnesemia     Lumbar radiculopathy     Major depression     Osteoarthritis     Patellofemoral syndrome     left knee    Prinzmetal's angina (H24)     Prinzmetal's angina (H24) 2003    Surgical menopause     Urge incontinence of urine        Social History:   Deferred.    Family History:  One of her paternal aunts  of a pulmonary embolism about 3-4 years ago. This aunt's daughter (Sridevi's first cousin) also has a history of pulmonary embolism  and is on long term anticoagulation therapy.   Father  of a stroke.   Mother is in her 70's and is in good health. No history of venous thromboembolism.   Sridevi has one brother with no history of venous thromboembolism.   Sridevi has no children of her own.     Objective:  Vitals: BP (!) 156/70 (BP Location: Right arm, Patient Position: Sitting, Cuff Size: Adult Large)   Pulse 68   Temp 97.4  F (36.3  C) (Tympanic)   Wt 110.5 kg (243 lb 9.6 oz)   LMP  (LMP Unknown)   SpO2 98%   BMI 44.56 kg/m    Exam:   Complete exam is not performed today.     Labs:  Component      Latest Ref Rng 2023  2:27 PM 10/24/2023  10:00 AM   WBC      4.0 - 11.0 10e3/uL  3.6 (L)    RBC Count      3.80 - 5.20 10e6/uL  3.67 (L)    Hemoglobin      11.7 - 15.7 g/dL  10.1 (L)    Hematocrit      35.0 - 47.0 %  32.7 (L)    MCV      78 - 100 fL  89    MCH      26.5 - 33.0 pg  27.5    MCHC      31.5 - 36.5 g/dL  30.9 (L)    RDW      10.0 - 15.0 %  13.9    Platelet Count      150 - 450 10e3/uL  172    Iron      37 - 145 ug/dL 51     Iron Binding Capacity      240 - 430 ug/dL 345     Iron Sat Index      15 - 46 % 15     Ferritin      11 - 328 ng/mL 159        2023 Labs done at [facility]:  Creatinine 1.13    2023 Labs done at [facility]:  Creatinine 1.74; Alk phos 89;  ; .     Imaging:  Reviewed and are as described above.     Assessment:  In summary, Sridevi is a 58 year old female with a history of CKD stage 2-3, anxiety, obesity, chronic narcotic dependency, who was found to have a saddle pulmonary embolism on 2023, referred by Dr. Rex Guerin of Neurosurgery at [facility] for consultation.     Sridevi's saddle pulmonary embolic event back in 2023 was likely a provoked event as she recalls that she was basically bed bound for several days between 2023 to 2023 because of severe back pain. Her first syncopal episode occurred on 2023 which is very  consistent with the timing of her immobilization. HOWEVER, as mentioned above, her pulmonary embolic event was a life-threatening event and one of her paternal aunts  of a pulmonary embolic event 3-4 years ago and her first cousin (the daughter of her aunt who  of PE) also has a history of pulmonary embolism.     To complicate matter here, Sridevi is found to have severe disc degenerative disease of the lumbar spine and sacral spine for which she was initially planned to undergo extensive spinal surgery by Dr. Rex Guerin of [facility]. But because of her recent pulmonary embolic event, this surgery obviously has been cancelled for the time being. Additionally, she is noted to be anemic for at least the past 5 years and this is being managed by her primary care provider including the fact that this patient has never had a colonoscopy done in the past.     Diagnosis:  Saddle pulmonary embolism back on 2023 S/P urgent mechanical thrombectomy. This was likely a provoked event as described above.   Family history of fatal pulmonary embolism (paternal aunt).   Family history of pulmonary embolism (first cousin, daughter of the aunt who had a fatal PE event).   Severe lumbar and sacral spinal disc degenerative disease.   Chronic pain.   Chronic kidney disease, Stage 2-3.   Anemia.     Plan:  I have a long discussion with Sridevi today in regard to our plan and recommendation.     I took some time today to educate Sridevi about DVT/PE, provoked vs unprovoked venous thromboembolic event and general approach in regard to anticoagulation therapy and duration. I explain to Sridevi that the current American Society of Hematology (SAIQB) guideline recommends 3-6 months of anticoagulation therapy for patients with a provoked venous thromboembolic event. However, the SAQIB guideline does not take into account of the individual family history. I explain to Sridevi given the extensiveness of her pulmonary  embolism, it is my recommendation that she should maintain on anticoagulation therapy for at least 6 months. AND in her particular case, because of her family history of pulmonary embolism (with one that was fatal), I would recommend that she stays on indefinite anticoagulation therapy. After answering all Sridevi's questions to her satisfaction, she is rather agreeable to stay on indefinite anticoagulation therapy as she is also anxious about a recurrent pulmonary embolic event.     She has so far done well on apixaban and thus I will keep her on apixaban at 5 mg PO BID dosing as her mainstay of anticoagulation therapy. I have provided her with a renewal of her apixaban prescription today.     I am in agreement with Dr. Rex Guerin that this patient should avoid any elective invasive or surgical procedures that requires her to interrupt her anticoagulation therapy for at least 6 months from 9/8/2023 considering the extensiveness of her pulmonary embolic event. Thus I am recommending postponing all elective procedures until after 3/8/2024.     Note that she will eventually need complex spinal surgery and she will still be on anticoagulation therapy at that time. Thus we will need an anticoagulation management plan when she is undergoing the surgery in the future. Apixaban has a rather short half-life and thus we will generally have her stop her apixaban for 48 hours prior to surgery without the need of enoxaparin bridging prior to surgery. Post operatively, one option is to have her be placed on unfractionated heparin 24 hours after surgery assuming that she has no bleeding issues, then transition her back to apixaban once she is able to tolerate oral intake and that she is no longer at risk for bleeding during the post operative period. I can discuss in more details with her about this during her follow up visit with me in March 2024.     I took sometime to discuss the utility of inherit thrombophilia workup. As  mentioned, this patient does have a family history of pulmonary embolism, it is conceivable that she does have inherit thrombophilia. However, the result of such workup will NOT change my recommendation that this patient should remain on indefinite anticoagulation therapy. In other words, if she is tested positive for any of the inherit thrombophilia, she should remain on indefinite anticoagulation therapy. If she is tested negative for any of the inherit thrombophilia, it is still my recommendation that she should remain on indefinite anticoagulation therapy. The only impact with the result of such workup is potentially there are some implications with her family members but has no affect on her anticoagulation therapy management. After our discussion and answering all her questions, Sridevi will forgo inherit thrombophilia workup at this time. I explain that we can reconsider doing the testing if she feels that she would like to proceed with it.     Plan Summary:  Continue apixaban at 5 mg PO BID dosing. Stay on indefinite anticoagulation therapy.   Postpone all elective surgery or invasive procedures until after 3/8/2024.   Anemia management and workup per primary care provider.   Forgo inherit thrombophilia workup for now.   Return to clinic to see me in March 2024 for follow up.      The patient is provided with our clinic's contact information and is instructed to call if she should have any further questions or concerns.       Emery Schwartz PA-C, MPAS  Physician Assistant  [facility].     65 minutes spent by me on the date of the encounter doing chart review, history and exam, documentation and further activities per the note.    Time IN: 11:20  Time OUT: 12:05

## 2023-11-09 LAB — NONINV COLON CA DNA+OCC BLD SCRN STL QL: NORMAL

## 2023-11-14 ENCOUNTER — OFFICE VISIT (OUTPATIENT)
Dept: HEMATOLOGY | Facility: CLINIC | Age: 58
End: 2023-11-14
Attending: NEUROLOGICAL SURGERY
Payer: COMMERCIAL

## 2023-11-14 VITALS
DIASTOLIC BLOOD PRESSURE: 70 MMHG | WEIGHT: 243.6 LBS | HEART RATE: 68 BPM | TEMPERATURE: 97.4 F | OXYGEN SATURATION: 98 % | BODY MASS INDEX: 44.56 KG/M2 | SYSTOLIC BLOOD PRESSURE: 156 MMHG

## 2023-11-14 DIAGNOSIS — I26.92 ACUTE SADDLE PULMONARY EMBOLISM, UNSPECIFIED WHETHER ACUTE COR PULMONALE PRESENT (H): ICD-10-CM

## 2023-11-14 PROCEDURE — G0463 HOSPITAL OUTPT CLINIC VISIT: HCPCS | Performed by: PHYSICIAN ASSISTANT

## 2023-11-14 PROCEDURE — 99205 OFFICE O/P NEW HI 60 MIN: CPT | Performed by: PHYSICIAN ASSISTANT

## 2023-11-14 NOTE — PATIENT INSTRUCTIONS
[facility]  [facility]  [address], [place] [address]  Main: [phone], Fax: [phone]    It was a pleasure seeing you today.  Thank you for allowing us to be involved in your care. Please let us know if there is anything else we can do for you, so that we can be sure you are leaving completely satisfied with your care experience.      For your upcoming procedure/surgery, please wait to schedule your spinal surgery until 6 months from your clotting event. March 2024    Please stay on your blood thinner:  Eliquis  Please call us with any bleeding issues that you may have.      Wear compression stockings if you have swelling in your leg. Take them off while you are sleeping. You may need to get new stockings every 3-6 months with regular wear.    Patient Resources:   For additional information, please see the following web link:  [email or web address]    Call the [facility]  at [phone].     -If surgeries or procedures are planned (for holding instructions).     -If off anticoagulation, please call during high risk times (long-distance travel, broken bones or trauma, immobilization, surgery, pregnancy, or taking estrogen).     -Any new symptoms of DVT (deep vein thrombosis) or PE (pulmonary embolism)    -pain     -swelling     -redness    -warmth    -shortness of breath    -chest pain    -coughing up blood      Return to clinic in  4 months.  You are scheduled for March 5th at 1030 with Emery Schwartz PA-C    Your nurse clinician is Johanny Hardwick RN, BSN, [phone] .   If she is unavailable and you have immediate concerns, please call [phone] and ask for a nurse.

## 2023-11-14 NOTE — LETTER
1          [facility]  [address] [place] [address], [place] [address]  Main: [phone], Fax: [phone]    Patient seen at: [facility] at [address] [place] [address]    Outpatient Visit Note:    Patient: Sridevi Centeno  MRN: [id number]  : 1965  MARGARITA: 2023    Reason for visit:  Saddle pulmonary embolism on 2023.     HPI:  Sridevi is a 58 year old female with a history of CKD stage 2-3, anxiety, obesity, chronic narcotic dependency, who was found to have a saddle pulmonary embolism on 2023, referred by Dr. Rex Guerin of Neurosurgery at [facility] for consultation.     Sridevi has a history of severe disc degeneration and facet arthritis of the L3-S1. She recalls that back on labor day weekend (2023 through 2023), she was experiencing severe back pain to the point that she was basically entirely bed bound for several days. Then on 2023, she had her first syncopal episode after filling the dog bowl with water. She denies any long distance travelling, traumatic injuries or any hospitalization or surgery in the month of Aug 2023.     Then on 2023, she presented to the emergency department within the [facility] with a 2 days history of tight chest pain and dyspnea on exertion as well as syncopal episodes on 2023 and was brought to the emergency department by EMS. At the time, a D-Dimer was noted to be 12.68 (normal <0.5). CTA chest showed saddle pulmonary embolism with extension into lobar, segmental, and subsegmental arterial branches in the right upper, right middle, and right lower lobes and the left upper lobar arterial branch. It also showed enlarged main pulmonary artery measures 3.7 cm in diameter consistent with right heart strain. She urgently underwent successful bilateral pulmonary angiography and mechanical thrombectomy on 2023. Bilateral lower extremity venous  ultrasound done on 9/9/2023 showed no DVT in either lower extremities. She was transitioned from unfractionated heparin to apixaban on 9/9/2023 and was discharged on 9/10/2023. During her hospitalization, she is also found to have a prolonged QT. Seen by cardiology and planned follow up as an outpatient. At time of discharged, she was placed on 2L of supplemental oxygen.     9/12/2023, she returned to the emergency department after a fall at home. Apparently she ran out of oxygen in the tank and after climbing up a flight of stairs, she passed out. Admitted overnight for observation and was discharged home. Repeat CTA chest was done showing interval clearing of saddle pulmonary embolism with bilateral central and subsegmental pulmonary emboli appearing decreased. Largest main pulmonary artery measuring 4.1 cm.    10/18/2023, she had a visit with Dr. Rex Guerin of Neurosurgery at [facility] for severe back pain and found to have severe disc degeneration and facet arthritis of L3-S1. Dr. Guerin discussed surgical options with the patient but because of her recent pulmonary embolism, she is referred by Dr. Guerin to our clinic for consultation.     10/25/2023, she was seen by Cardiology. They determined that recent echocardiogram shoed resolution of RV dilation and dysfunction and they agree with hematology evaluation. Cardiology determined that her pulmonary embolic event was an unprovoked event.     Currently, Sridevi reports that she is on apixaban at 5 mg PO BID dosing. She denies any bleeding issues. Today, she is no longer on oxygen supplementation. She denies any shortness of breath today or any chest pain. She denies any lower extremity swelling or pain.     Sridevi has never had a colonoscopy. I do note that she has been anemic for at least the past 5 years. Her primary care provider, Dr. Keane, has recently started her on oral iron replacement therapy and also ordered a Cologuard test.      ROS:  Denies any bleeding complications. Specifically, no frequent epistaxis. No issues with oral mucosal bleeding. Denies any hematuria or blood in stools. Denies any shortness of breath. No chest pain. No cough. No fever.    Medications:  Current Outpatient Medications   Medication     albuterol (PROAIR HFA/PROVENTIL HFA/VENTOLIN HFA) 108 (90 Base) MCG/ACT inhaler     apixaban ANTICOAGULANT (ELIQUIS ANTICOAGULANT) 5 MG tablet     aspirin-acetaminophen-caffeine (EXCEDRIN MIGRAINE) 250-250-65 MG tablet     DULoxetine (CYMBALTA) 30 MG capsule     ferrous sulfate (FEROSUL) 325 (65 Fe) MG tablet     ferrous sulfate (FEROSUL) 325 (65 Fe) MG tablet     ferrous sulfate (FEROSUL) 325 (65 Fe) MG tablet     oxyCODONE IR (ROXICODONE) 10 MG tablet     oxyCODONE-acetaminophen (PERCOCET)  MG per tablet     OXYCONTIN 10 MG 12 hr tablet     Pregabalin (LYRICA) 200 MG capsule     No current facility-administered medications for this visit.     Allergies:  Allergies   Allergen Reactions     Hydrocodone-Acetaminophen GI Disturbance and Nausea     Ibuprofen Nausea     PmHx:  Past Medical History:   Diagnosis Date     Anxiety disorder      Arthritis      Bladder wall thickening      Brachial neuritis or radiculitis      Cervical spinal stenosis      Chronic pain      Constipation      Degenerative cervical disc      Edema      Generalized anxiety disorder      Hematuria      Hydronephrosis     right     Hypercholesteremia      Hypercholesteremia      Hypertension      Hypertension      Hypokalemia      Hypomagnesemia      Lumbar radiculopathy      Major depression      Osteoarthritis      Patellofemoral syndrome     left knee     Prinzmetal's angina (H24)      Prinzmetal's angina (H24) 2003     Surgical menopause      Urge incontinence of urine        Social History:   Deferred.    Family History:  One of her paternal aunts  of a pulmonary embolism about 3-4 years ago. This aunt's daughter (Sridevi's first cousin)  also has a history of pulmonary embolism and is on long term anticoagulation therapy.   Father  of a stroke.   Mother is in her 70's and is in good health. No history of venous thromboembolism.   Sridevi has one brother with no history of venous thromboembolism.   Sridevi has no children of her own.     Objective:  Vitals: BP (!) 156/70 (BP Location: Right arm, Patient Position: Sitting, Cuff Size: Adult Large)   Pulse 68   Temp 97.4  F (36.3  C) (Tympanic)   Wt 110.5 kg (243 lb 9.6 oz)   LMP  (LMP Unknown)   SpO2 98%   BMI 44.56 kg/m    Exam:   Complete exam is not performed today.     Labs:  Component      Latest Ref Rng 2023  2:27 PM 10/24/2023  10:00 AM   WBC      4.0 - 11.0 10e3/uL  3.6 (L)    RBC Count      3.80 - 5.20 10e6/uL  3.67 (L)    Hemoglobin      11.7 - 15.7 g/dL  10.1 (L)    Hematocrit      35.0 - 47.0 %  32.7 (L)    MCV      78 - 100 fL  89    MCH      26.5 - 33.0 pg  27.5    MCHC      31.5 - 36.5 g/dL  30.9 (L)    RDW      10.0 - 15.0 %  13.9    Platelet Count      150 - 450 10e3/uL  172    Iron      37 - 145 ug/dL 51     Iron Binding Capacity      240 - 430 ug/dL 345     Iron Sat Index      15 - 46 % 15     Ferritin      11 - 328 ng/mL 159        2023 Labs done at [facility]:  Creatinine 1.13    2023 Labs done at [facility]:  Creatinine 1.74; Alk phos 89;  ; .     Imaging:  Reviewed and are as described above.     Assessment:  In summary, Sridevi is a 58 year old female with a history of CKD stage 2-3, anxiety, obesity, chronic narcotic dependency, who was found to have a saddle pulmonary embolism on 2023, referred by Dr. Rex Guerin of Neurosurgery at [facility] for consultation.     Sridevi's saddle pulmonary embolic event back in 2023 was likely a provoked event as she recalls that she was basically bed bound for several days between 2023 to 2023 because of severe back pain. Her first syncopal episode  occurred on 2023 which is very consistent with the timing of her immobilization. HOWEVER, as mentioned above, her pulmonary embolic event was a life-threatening event and one of her paternal aunts  of a pulmonary embolic event 3-4 years ago and her first cousin (the daughter of her aunt who  of PE) also has a history of pulmonary embolism.     To complicate matter here, Sridevi is found to have severe disc degenerative disease of the lumbar spine and sacral spine for which she was initially planned to undergo extensive spinal surgery by Dr. Rex Guerin of [facility]. But because of her recent pulmonary embolic event, this surgery obviously has been cancelled for the time being. Additionally, she is noted to be anemic for at least the past 5 years and this is being managed by her primary care provider including the fact that this patient has never had a colonoscopy done in the past.     Diagnosis:  Saddle pulmonary embolism back on 2023 S/P urgent mechanical thrombectomy. This was likely a provoked event as described above.   Family history of fatal pulmonary embolism (paternal aunt).   Family history of pulmonary embolism (first cousin, daughter of the aunt who had a fatal PE event).   Severe lumbar and sacral spinal disc degenerative disease.   Chronic pain.   Chronic kidney disease, Stage 2-3.   Anemia.     Plan:  I have a long discussion with Sridevi today in regard to our plan and recommendation.     I took some time today to educate Sridevi about DVT/PE, provoked vs unprovoked venous thromboembolic event and general approach in regard to anticoagulation therapy and duration. I explain to Sridevi that the current American Society of Hematology (SAQIB) guideline recommends 3-6 months of anticoagulation therapy for patients with a provoked venous thromboembolic event. However, the SAQIB guideline does not take into account of the individual family history. I explain to Sridevi given the  extensiveness of her pulmonary embolism, it is my recommendation that she should maintain on anticoagulation therapy for at least 6 months. AND in her particular case, because of her family history of pulmonary embolism (with one that was fatal), I would recommend that she stays on indefinite anticoagulation therapy. After answering all Sridevi's questions to her satisfaction, she is rather agreeable to stay on indefinite anticoagulation therapy as she is also anxious about a recurrent pulmonary embolic event.     She has so far done well on apixaban and thus I will keep her on apixaban at 5 mg PO BID dosing as her mainstay of anticoagulation therapy. I have provided her with a renewal of her apixaban prescription today.     I am in agreement with Dr. Rex Guerin that this patient should avoid any elective invasive or surgical procedures that requires her to interrupt her anticoagulation therapy for at least 6 months from 9/8/2023 considering the extensiveness of her pulmonary embolic event. Thus I am recommending postponing all elective procedures until after 3/8/2024.     Note that she will eventually need complex spinal surgery and she will still be on anticoagulation therapy at that time. Thus we will need an anticoagulation management plan when she is undergoing the surgery in the future. Apixaban has a rather short half-life and thus we will generally have her stop her apixaban for 48 hours prior to surgery without the need of enoxaparin bridging prior to surgery. Post operatively, one option is to have her be placed on unfractionated heparin 24 hours after surgery assuming that she has no bleeding issues, then transition her back to apixaban once she is able to tolerate oral intake and that she is no longer at risk for bleeding during the post operative period. I can discuss in more details with her about this during her follow up visit with me in March 2024.     I took sometime to discuss the utility of  inherit thrombophilia workup. As mentioned, this patient does have a family history of pulmonary embolism, it is conceivable that she does have inherit thrombophilia. However, the result of such workup will NOT change my recommendation that this patient should remain on indefinite anticoagulation therapy. In other words, if she is tested positive for any of the inherit thrombophilia, she should remain on indefinite anticoagulation therapy. If she is tested negative for any of the inherit thrombophilia, it is still my recommendation that she should remain on indefinite anticoagulation therapy. The only impact with the result of such workup is potentially there are some implications with her family members but has no affect on her anticoagulation therapy management. After our discussion and answering all her questions, Sridevi will forgo inherit thrombophilia workup at this time. I explain that we can reconsider doing the testing if she feels that she would like to proceed with it.     Plan Summary:  Continue apixaban at 5 mg PO BID dosing. Stay on indefinite anticoagulation therapy.   Postpone all elective surgery or invasive procedures until after 3/8/2024.   Anemia management and workup per primary care provider.   Forgo inherit thrombophilia workup for now.   Return to clinic to see me in March 2024 for follow up.      The patient is provided with our clinic's contact information and is instructed to call if she should have any further questions or concerns.       Emery Schwartz PA-C, MPAS  Physician Assistant  [facility].     65 minutes spent by me on the date of the encounter doing chart review, history and exam, documentation and further activities per the note.    Time IN: 11:20  Time OUT: 12:05

## 2023-11-21 ENCOUNTER — HOSPITAL ENCOUNTER (OUTPATIENT)
Dept: ULTRASOUND IMAGING | Facility: CLINIC | Age: 58
Discharge: HOME | End: 2023-11-21
Attending: STUDENT IN AN ORGANIZED HEALTH CARE EDUCATION/TRAINING PROGRAM | Admitting: STUDENT IN AN ORGANIZED HEALTH CARE EDUCATION/TRAINING PROGRAM
Payer: COMMERCIAL

## 2023-11-21 DIAGNOSIS — M79.89 LEFT LEG SWELLING: ICD-10-CM

## 2023-11-21 PROCEDURE — 93970 EXTREMITY STUDY: CPT

## 2023-12-24 PROBLEM — J44.9 COPD (CHRONIC OBSTRUCTIVE PULMONARY DISEASE) (H): Status: ACTIVE | Noted: 2023-12-24

## 2023-12-24 PROBLEM — J44.9 COPD (CHRONIC OBSTRUCTIVE PULMONARY DISEASE) (H): Status: RESOLVED | Noted: 2023-12-24 | Resolved: 2023-12-24

## 2024-01-03 ENCOUNTER — TELEPHONE (OUTPATIENT)
Dept: CARDIOLOGY | Facility: CLINIC | Age: 59
End: 2024-01-03
Payer: COMMERCIAL

## 2024-01-03 NOTE — TELEPHONE ENCOUNTER
LM for pt that the visit that was scheduled is probably not needed as what she would be coming in for was addressed at her OV with Dr Shey Rodriguez

## 2024-01-04 NOTE — TELEPHONE ENCOUNTER
Spoke to pt who agrees that she does not nee the visit with Dr Dr Qureshi on 1/31 in . Visit cancelled. JNelsonRALAN

## 2024-01-31 ENCOUNTER — OFFICE VISIT (OUTPATIENT)
Dept: FAMILY MEDICINE | Facility: CLINIC | Age: 59
End: 2024-01-31
Payer: COMMERCIAL

## 2024-01-31 ENCOUNTER — TELEPHONE (OUTPATIENT)
Dept: FAMILY MEDICINE | Facility: CLINIC | Age: 59
End: 2024-01-31

## 2024-01-31 VITALS
HEIGHT: 62 IN | BODY MASS INDEX: 45.27 KG/M2 | HEART RATE: 69 BPM | SYSTOLIC BLOOD PRESSURE: 143 MMHG | DIASTOLIC BLOOD PRESSURE: 95 MMHG | TEMPERATURE: 97.3 F | WEIGHT: 246 LBS | RESPIRATION RATE: 20 BRPM | OXYGEN SATURATION: 100 %

## 2024-01-31 DIAGNOSIS — F33.9 EPISODE OF RECURRENT MAJOR DEPRESSIVE DISORDER, UNSPECIFIED DEPRESSION EPISODE SEVERITY (H): ICD-10-CM

## 2024-01-31 DIAGNOSIS — E78.00 HYPERCHOLESTEREMIA: ICD-10-CM

## 2024-01-31 DIAGNOSIS — E66.01 MORBID OBESITY (H): ICD-10-CM

## 2024-01-31 DIAGNOSIS — R41.0 CONFUSION: ICD-10-CM

## 2024-01-31 DIAGNOSIS — R60.9 EDEMA, UNSPECIFIED TYPE: ICD-10-CM

## 2024-01-31 DIAGNOSIS — E66.813 CLASS 3 SEVERE OBESITY WITH SERIOUS COMORBIDITY AND BODY MASS INDEX (BMI) OF 45.0 TO 49.9 IN ADULT, UNSPECIFIED OBESITY TYPE (H): ICD-10-CM

## 2024-01-31 DIAGNOSIS — F33.0 MILD EPISODE OF RECURRENT MAJOR DEPRESSIVE DISORDER (H): ICD-10-CM

## 2024-01-31 DIAGNOSIS — E66.01 CLASS 3 SEVERE OBESITY WITH SERIOUS COMORBIDITY AND BODY MASS INDEX (BMI) OF 45.0 TO 49.9 IN ADULT, UNSPECIFIED OBESITY TYPE (H): ICD-10-CM

## 2024-01-31 DIAGNOSIS — Z12.31 VISIT FOR SCREENING MAMMOGRAM: ICD-10-CM

## 2024-01-31 DIAGNOSIS — I26.92 ACUTE SADDLE PULMONARY EMBOLISM, UNSPECIFIED WHETHER ACUTE COR PULMONALE PRESENT (H): ICD-10-CM

## 2024-01-31 DIAGNOSIS — Z79.891 CHRONIC PRESCRIPTION OPIATE USE: ICD-10-CM

## 2024-01-31 DIAGNOSIS — G89.29 OTHER CHRONIC PAIN: Primary | ICD-10-CM

## 2024-01-31 DIAGNOSIS — Z00.00 HEALTH CARE MAINTENANCE: ICD-10-CM

## 2024-01-31 DIAGNOSIS — D64.9 ANEMIA, UNSPECIFIED TYPE: ICD-10-CM

## 2024-01-31 DIAGNOSIS — I10 ESSENTIAL HYPERTENSION: ICD-10-CM

## 2024-01-31 LAB
ALBUMIN SERPL BCG-MCNC: 4.5 G/DL (ref 3.5–5.2)
ALBUMIN UR-MCNC: ABNORMAL MG/DL
ALP SERPL-CCNC: 86 U/L (ref 40–150)
ALT SERPL W P-5'-P-CCNC: 35 U/L (ref 0–50)
ANION GAP SERPL CALCULATED.3IONS-SCNC: 12 MMOL/L (ref 7–15)
APPEARANCE UR: CLEAR
AST SERPL W P-5'-P-CCNC: 46 U/L (ref 0–45)
BACTERIA #/AREA URNS HPF: ABNORMAL /HPF
BILIRUB SERPL-MCNC: 0.3 MG/DL
BILIRUB UR QL STRIP: NEGATIVE
BUN SERPL-MCNC: 8.6 MG/DL (ref 8–23)
CALCIUM SERPL-MCNC: 9.5 MG/DL (ref 8.6–10)
CHLORIDE SERPL-SCNC: 100 MMOL/L (ref 98–107)
CHOLEST SERPL-MCNC: 205 MG/DL
COLOR UR AUTO: YELLOW
CREAT SERPL-MCNC: 0.74 MG/DL (ref 0.51–0.95)
CRP SERPL-MCNC: 45.9 MG/L
DEPRECATED HCO3 PLAS-SCNC: 28 MMOL/L (ref 22–29)
EGFRCR SERPLBLD CKD-EPI 2021: >90 ML/MIN/1.73M2
ERYTHROCYTE [DISTWIDTH] IN BLOOD BY AUTOMATED COUNT: 14.3 % (ref 10–15)
FASTING STATUS PATIENT QL REPORTED: YES
FERRITIN SERPL-MCNC: 164 NG/ML (ref 11–328)
FOLATE SERPL-MCNC: 18.1 NG/ML (ref 4.6–34.8)
GLUCOSE SERPL-MCNC: 80 MG/DL (ref 70–99)
GLUCOSE UR STRIP-MCNC: NEGATIVE MG/DL
HCT VFR BLD AUTO: 35.8 % (ref 35–47)
HDLC SERPL-MCNC: 71 MG/DL
HGB BLD-MCNC: 10.8 G/DL (ref 11.7–15.7)
HGB UR QL STRIP: ABNORMAL
IRON BINDING CAPACITY (ROCHE): 366 UG/DL (ref 240–430)
IRON SATN MFR SERPL: 9 % (ref 15–46)
IRON SERPL-MCNC: 33 UG/DL (ref 37–145)
KETONES UR STRIP-MCNC: ABNORMAL MG/DL
LDH SERPL L TO P-CCNC: 408 U/L (ref 0–250)
LDLC SERPL CALC-MCNC: 120 MG/DL
LEUKOCYTE ESTERASE UR QL STRIP: NEGATIVE
MCH RBC QN AUTO: 27.6 PG (ref 26.5–33)
MCHC RBC AUTO-ENTMCNC: 30.2 G/DL (ref 31.5–36.5)
MCV RBC AUTO: 91 FL (ref 78–100)
NITRATE UR QL: NEGATIVE
NONHDLC SERPL-MCNC: 134 MG/DL
PH UR STRIP: 7.5 [PH] (ref 5–8)
PLATELET # BLD AUTO: 210 10E3/UL (ref 150–450)
POTASSIUM SERPL-SCNC: 3.6 MMOL/L (ref 3.4–5.3)
PROT SERPL-MCNC: 8 G/DL (ref 6.4–8.3)
RBC # BLD AUTO: 3.92 10E6/UL (ref 3.8–5.2)
RBC #/AREA URNS AUTO: ABNORMAL /HPF
SODIUM SERPL-SCNC: 140 MMOL/L (ref 135–145)
SP GR UR STRIP: 1.02 (ref 1–1.03)
SQUAMOUS #/AREA URNS AUTO: ABNORMAL /LPF
T PALLIDUM AB SER QL: NONREACTIVE
TRIGL SERPL-MCNC: 69 MG/DL
UROBILINOGEN UR STRIP-ACNC: 1 E.U./DL
VIT B12 SERPL-MCNC: 449 PG/ML (ref 232–1245)
WBC # BLD AUTO: 4.9 10E3/UL (ref 4–11)
WBC #/AREA URNS AUTO: ABNORMAL /HPF

## 2024-01-31 PROCEDURE — 99215 OFFICE O/P EST HI 40 MIN: CPT | Mod: 25 | Performed by: FAMILY MEDICINE

## 2024-01-31 PROCEDURE — 99000 SPECIMEN HANDLING OFFICE-LAB: CPT | Performed by: FAMILY MEDICINE

## 2024-01-31 PROCEDURE — 82728 ASSAY OF FERRITIN: CPT | Performed by: FAMILY MEDICINE

## 2024-01-31 PROCEDURE — 82746 ASSAY OF FOLIC ACID SERUM: CPT | Performed by: FAMILY MEDICINE

## 2024-01-31 PROCEDURE — 86140 C-REACTIVE PROTEIN: CPT | Performed by: FAMILY MEDICINE

## 2024-01-31 PROCEDURE — 80061 LIPID PANEL: CPT | Performed by: FAMILY MEDICINE

## 2024-01-31 PROCEDURE — 83540 ASSAY OF IRON: CPT | Performed by: FAMILY MEDICINE

## 2024-01-31 PROCEDURE — 86780 TREPONEMA PALLIDUM: CPT | Performed by: FAMILY MEDICINE

## 2024-01-31 PROCEDURE — 80053 COMPREHEN METABOLIC PANEL: CPT | Performed by: FAMILY MEDICINE

## 2024-01-31 PROCEDURE — 81001 URINALYSIS AUTO W/SCOPE: CPT | Performed by: FAMILY MEDICINE

## 2024-01-31 PROCEDURE — 83615 LACTATE (LD) (LDH) ENZYME: CPT | Performed by: FAMILY MEDICINE

## 2024-01-31 PROCEDURE — 82607 VITAMIN B-12: CPT | Performed by: FAMILY MEDICINE

## 2024-01-31 PROCEDURE — 85027 COMPLETE CBC AUTOMATED: CPT | Performed by: FAMILY MEDICINE

## 2024-01-31 PROCEDURE — 36415 COLL VENOUS BLD VENIPUNCTURE: CPT | Performed by: FAMILY MEDICINE

## 2024-01-31 PROCEDURE — G0439 PPPS, SUBSEQ VISIT: HCPCS | Performed by: FAMILY MEDICINE

## 2024-01-31 PROCEDURE — 84238 ASSAY NONENDOCRINE RECEPTOR: CPT | Mod: 90 | Performed by: FAMILY MEDICINE

## 2024-01-31 PROCEDURE — 83550 IRON BINDING TEST: CPT | Performed by: FAMILY MEDICINE

## 2024-01-31 RX ORDER — DULOXETIN HYDROCHLORIDE 30 MG/1
30 CAPSULE, DELAYED RELEASE ORAL 2 TIMES DAILY
Start: 2024-01-31 | End: 2024-03-01

## 2024-01-31 SDOH — HEALTH STABILITY: PHYSICAL HEALTH: ON AVERAGE, HOW MANY DAYS PER WEEK DO YOU ENGAGE IN MODERATE TO STRENUOUS EXERCISE (LIKE A BRISK WALK)?: 7 DAYS

## 2024-01-31 ASSESSMENT — SOCIAL DETERMINANTS OF HEALTH (SDOH): HOW OFTEN DO YOU GET TOGETHER WITH FRIENDS OR RELATIVES?: NEVER

## 2024-01-31 ASSESSMENT — PATIENT HEALTH QUESTIONNAIRE - PHQ9
10. IF YOU CHECKED OFF ANY PROBLEMS, HOW DIFFICULT HAVE THESE PROBLEMS MADE IT FOR YOU TO DO YOUR WORK, TAKE CARE OF THINGS AT HOME, OR GET ALONG WITH OTHER PEOPLE: VERY DIFFICULT
SUM OF ALL RESPONSES TO PHQ QUESTIONS 1-9: 16
SUM OF ALL RESPONSES TO PHQ QUESTIONS 1-9: 16

## 2024-01-31 NOTE — ASSESSMENT & PLAN NOTE
Repeat iron studies, CBC, reticulocyte count today.  Also LDH.  Consider peripheral smear and further workup.  Probably needs colonoscopy but on DOAC and recommendation of no interruption until 6 months out from acute thromboembolic event in September which means after 3/8.

## 2024-01-31 NOTE — ASSESSMENT & PLAN NOTE
Not currently on any antihypertensives, perhaps deserves these given elevated blood pressure today.  Recheck when she comes back.

## 2024-01-31 NOTE — PROGRESS NOTES
Assessment/ Plan  Class 3 severe obesity with serious comorbidity and body mass index (BMI) of 45.0 to 49.9 in adult, unspecified obesity type (H)  Patient with long thompson with obesity.  Very high BMI 45.  Severe back pain, severe osteoarthritis of knees plus hypertension, edema  Referral to bariatrics    Health care maintenance  Healthcare maintenance assessment  Immunization-declines hepatitis B, I do not see specific indication for pneumococcal, discussed Shingrix to be obtained at a pharmacy.   Cancer screening-planning to do Cologuard in the near future though probably deserves colonoscopy.  Needs to wait until after 3/8/2024 due to recent (6-month) DVT/PE.  On DOAC.  Mammogram ordered  She has had a hysterectomy  Vascular-check lipid today, blood pressure borderline and we will plan on rechecking this next visit.  Non smoker  Other-not recently sexually active      Hypertension  Not currently on any antihypertensives, perhaps deserves these given elevated blood pressure today.  Recheck when she comes back.    Major Depression, Recurrent  Severe depression,  Long-term.  Previously saw therapist indicates she has never seen psychiatry.  Patient admits to paranoid behavior, worried about people getting in her apartment, sleeps with the lights on.  Question as to whether she has some history compatible with amber.  At times she goes for a few days without sleeping.  Plus the paranoid thoughts but these seem more constant.  No racing thoughts.  Some mild impulsive behavior.  Not sure that is all up and down however.    She has not been taking duloxetine.  She indicates that she takes this for time, it helps, she feels well and then stops it.  I recommend she restart this.  Consideration given to placing her on a mood stabilizer.  But given her obesity-decided to wait.  Will refer her for psychiatry and therapist.    Acute saddle pulmonary embolism, unspecified whether acute cor pulmonale present (H)  Plan  indefinite anticoagulation, hold off on procedures including colonoscopy until after 3/8.    Anemia, unspecified type  Repeat iron studies, CBC, reticulocyte count today.  Also LDH.  Consider peripheral smear and further workup.  Probably needs colonoscopy but on DOAC and recommendation of no interruption until 6 months out from acute thromboembolic event in September which means after 3/8.    Confusion  And reports episodes of time where she has had confusion over the last 2 months or so.  1 landed her in the hospital.  Family is concerned about this, seems to come and go.    Patient has been stable on same opiates/Lyrica  Adamantly.denies other drug use  No head injury, head pain.  During hospitalization had CT scan.  Plan workup with labs listed below plus MRI plus referral to neurology plus referral to psychiatry      Edema  Lower extremities, quite severe,?  Post DVT syndrome.  Recent ultrasound negative for DVT.  Ace wrap for now, compression stockings, follow-up 1 month    Hypercholesteremia  Check lipid   Subjective  CC:  chief complaint  HPI:  58-year-old, scheduled for preventative visit but has not had follow-up from hospitalization.  Patient was admitted to [facility] with observed confusion and restlessness, per family.  Home care was concerned that she was not taking her medications.  She was diagnosed with encephalopathy in a patient with chronic opiate use, hypertension, AKA, history of pulmonary embolism.  Workup showed a hemoglobin of 9.2.,  CBC otherwise unremarkable.  She had a normal creatinine but elevated BN BUN of 29.  Potassium was low at 3.3.  CT of her brain was negative.  She was noted to have some bright red blood per rectum.  Patient left AMA from above hospitalization.    Patient had a provoked saddle PE and was hospitalized between 9/2 and 9/5 at [place].  She had been bedbound prior to this for back pain.  I she saw Dr. Schwartz for this who recommended long-term therapy after  consideration of family history of severe DVT.  She recommended holding off on back procedure until after 3/8/2024.  She recommended a follow-up lower extremity ultrasound which was negative for DVT.  Last pain management note I have on the chart is from [facility] pain [place] in 2016.  Patient had been discharged from their.  Was at that time on pregabalin 200 mg, 90/month, Percocet 10/325 150/month and OxyContin 10 twice daily.    Issues discussed today are mostly outlined above.  Concerned about weight and would like to start one of the new weight loss shots.  PFSH:  Patient Active Problem List   Diagnosis    Hypokalemia    Hematuria    Urge Incontinence Of Urine    Chronic Constipation    Cervical Spine Stenosis    Generalized anxiety disorder    Patellofemoral Syndrome Of The Left Knee    Hypercholesteremia    Major Depression, Recurrent    Hypertension    Generalized Osteoarthritis    Cervical Disc Degeneration    Lumbar Radiculopathy    Bladder wall thickening    Hydronephrosis, right    Hypomagnesemia    Brachial neuritis or radiculitis NOS    Health care maintenance    Chronic pain    Osteoarthritis of knees, bilateral    Edema    Healthcare maintenance    Chronic patellofemoral pain of both knees    Class 3 severe obesity with serious comorbidity and body mass index (BMI) of 45.0 to 49.9 in adult, unspecified obesity type (H)    Impairment of balance    Prinzmetal's angina (H24)    Bilateral lower extremity edema    Snoring    Wheezing    Spinal stenosis of lumbar region, unspecified whether neurogenic claudication present    Anemia, unspecified type    Acute saddle pulmonary embolism, unspecified whether acute cor pulmonale present (H)    Prolonged QT interval syndrome    Abnormal electrocardiogram (ECG) (EKG)    Confusion    Chronic prescription opiate use     albuterol (PROAIR HFA/PROVENTIL HFA/VENTOLIN HFA) 108 (90 Base) MCG/ACT inhaler, Inhale 2 puffs by mouth into the lungs every 6 hours  apixaban  "ANTICOAGULANT (ELIQUIS ANTICOAGULANT) 5 MG tablet, Take 1 tablet (5 mg) by mouth 2 times daily  aspirin-acetaminophen-caffeine (EXCEDRIN MIGRAINE) 250-250-65 MG tablet, Take 1 tablet by mouth daily as needed for headaches  ferrous sulfate (FEROSUL) 325 (65 Fe) MG tablet, Take 1 tablet (325 mg) by mouth every other day  oxyCODONE-acetaminophen (PERCOCET)  MG per tablet, Take 1 tablet by mouth every 4 hours as needed for severe pain  OXYCONTIN 10 MG 12 hr tablet, TAKE ONE TABLET BY MOUTH TWICE A DAY AS DIRECTED  Pregabalin (LYRICA) 200 MG capsule,   DULoxetine (CYMBALTA) 30 MG capsule, Take 1 capsule (30 mg) by mouth 2 times daily (Patient not taking: Reported on 1/31/2024)  ferrous sulfate (FEROSUL) 325 (65 Fe) MG tablet, Take 1 tablet (325 mg) by mouth daily (with breakfast)  ferrous sulfate (FEROSUL) 325 (65 Fe) MG tablet, Take 1 tablet (325 mg) by mouth daily (with breakfast)  oxyCODONE IR (ROXICODONE) 10 MG tablet, Take 10 mg by mouth every 4 hours as needed for severe pain    No current facility-administered medications on file prior to visit.       History   Smoking Status    Former    Types: Cigarettes   Smokeless Tobacco    Never     Social History     Social History Narrative    Not on file     Patient Care Team:  Jaime Keane MD as PCP - General (Family Medicine)  Jaime Keane MD as Assigned PCP  Johnathan Kelly MD as MD (Cardiovascular Disease)  Jose Miguel Qureshi MD as MD (Cardiovascular Disease)  Johnathan Kelly MD as Assigned Heart and Vascular Provider  Emery Schwartz PA-C as Assigned Cancer Care Provider        Objective  Physical Exam  Vitals:    01/31/24 0838 01/31/24 0846   BP: (!) 141/88 (!) 143/95   BP Location: Left arm Left arm   Patient Position: Sitting Sitting   Cuff Size: Adult Large Adult Large   Pulse: 69    Resp: 20    Temp: 97.3  F (36.3  C)    TempSrc: Temporal    SpO2: 100%    Weight: 111.6 kg (246 lb)    Height: 1.575 m (5' 2\")      Body mass index is 44.99 " kg/m .  Gen- alert, oriented/ appropriately responsive  Obese  HEENT- normal cephalic, atraumatic.   Oral cavity grossly normal  Chest- Normal inspiration and expiration.    Clear to ascultation.    No chest wall deformity or scar.  CV- Heart regular rate and rhythm  normal tones, no murmurs   Abdomen-soft, and nontender, no organomegaly or masses.  No gallops or rubs.  Ext- appear well perfused, severe left greater than right  Skin- warm and dry, no concerning lesions/rash noted  Neuro exam grossly nonfocal-cranial nerves intact, normal gait, movements.  no visualized rash    Diagnostics:   Results for orders placed or performed in visit on 01/31/24   CBC with platelets     Status: Abnormal   Result Value Ref Range    WBC Count 4.9 4.0 - 11.0 10e3/uL    RBC Count 3.92 3.80 - 5.20 10e6/uL    Hemoglobin 10.8 (L) 11.7 - 15.7 g/dL    Hematocrit 35.8 35.0 - 47.0 %    MCV 91 78 - 100 fL    MCH 27.6 26.5 - 33.0 pg    MCHC 30.2 (L) 31.5 - 36.5 g/dL    RDW 14.3 10.0 - 15.0 %    Platelet Count 210 150 - 450 10e3/uL   Folate     Status: Normal   Result Value Ref Range    Folic Acid 18.1 4.6 - 34.8 ng/mL   Treponema Abs w Reflex to RPR and Titer     Status: Normal   Result Value Ref Range    Treponema Antibody Total Nonreactive Nonreactive   UA Macroscopic with reflex to Microscopic and Culture     Status: Abnormal    Specimen: Urine, Midstream   Result Value Ref Range    Color Urine Yellow Colorless, Straw, Light Yellow, Yellow    Appearance Urine Clear Clear    Glucose Urine Negative Negative mg/dL    Bilirubin Urine Negative Negative    Ketones Urine Trace (A) Negative mg/dL    Specific Gravity Urine 1.020 1.005 - 1.030    Blood Urine Small (A) Negative    pH Urine 7.5 5.0 - 8.0    Protein Albumin Urine Trace (A) Negative mg/dL    Urobilinogen Urine 1.0 0.2, 1.0 E.U./dL    Nitrite Urine Negative Negative    Leukocyte Esterase Urine Negative Negative   UA Microscopic with Reflex to Culture     Status: Abnormal   Result Value  Ref Range    Bacteria Urine Few (A) None Seen /HPF    RBC Urine 5-10 (A) 0-2 /HPF /HPF    WBC Urine 0-5 0-5 /HPF /HPF    Squamous Epithelials Urine Few (A) None Seen /LPF    Narrative    Urine Culture not indicated           Please note: Voice recognition software was used in this dictation.  It may therefore contain typographical errors.    A total of 65 minutes was spent on this visit reviewing previous notes, counseling patient, ordering tests , adjusting meds (see below) and documenting the findings in this note      DME (Durable Medical Equipment) Orders and Documentation  Orders Placed This Encounter   Procedures    Compression Sleeve/Stocking Order for DME - ONLY FOR DME        The patient was assessed and it was determined the patient is in need of the following listed DME Supplies/Equipment. Please complete supporting documentation below to demonstrate medical necessity.

## 2024-01-31 NOTE — ASSESSMENT & PLAN NOTE
Severe depression,  Long-term.  Previously saw therapist indicates she has never seen psychiatry.  Patient admits to paranoid behavior, worried about people getting in her apartment, sleeps with the lights on.  Question as to whether she has some history compatible with amber.  At times she goes for a few days without sleeping.  Plus the paranoid thoughts but these seem more constant.  No racing thoughts.  Some mild impulsive behavior.  Not sure that is all up and down however.    She has not been taking duloxetine.  She indicates that she takes this for time, it helps, she feels well and then stops it.  I recommend she restart this.  Consideration given to placing her on a mood stabilizer.  But given her obesity-decided to wait.  Will refer her for psychiatry and therapist.

## 2024-01-31 NOTE — TELEPHONE ENCOUNTER
Patient Quality Outreach    Patient is due for the following:   Breast Cancer Screening - Mammogram    Next Steps:   Schedule a Adult Preventative    Type of outreach:    Sent Done. message.      Questions for provider review:    None           Andrea Behrend

## 2024-01-31 NOTE — ASSESSMENT & PLAN NOTE
Lower extremities, quite severe,?  Post DVT syndrome.  Recent ultrasound negative for DVT.  Ace wrap for now, compression stockings, follow-up 1 month

## 2024-01-31 NOTE — ASSESSMENT & PLAN NOTE
And reports episodes of time where she has had confusion over the last 2 months or so.  1 landed her in the hospital.  Family is concerned about this, seems to come and go.    Patient has been stable on same opiates/Lyrica  Adamantly.denies other drug use  No head injury, head pain.  During hospitalization had CT scan.  Plan workup with labs listed below plus MRI plus referral to neurology plus referral to psychiatry

## 2024-01-31 NOTE — ASSESSMENT & PLAN NOTE
Patient with long thompson with obesity.  Very high BMI 45.  Severe back pain, severe osteoarthritis of knees plus hypertension, edema  Referral to bariatrics

## 2024-01-31 NOTE — ASSESSMENT & PLAN NOTE
Healthcare maintenance assessment  Immunization-declines hepatitis B, I do not see specific indication for pneumococcal, discussed Shingrix to be obtained at a pharmacy.   Cancer screening-planning to do Cologuard in the near future though probably deserves colonoscopy.  Needs to wait until after 3/8/2024 due to recent (6-month) DVT/PE.  On DOAC.  Mammogram ordered  She has had a hysterectomy  Vascular-check lipid today, blood pressure borderline and we will plan on rechecking this next visit.  Non smoker  Other-not recently sexually active

## 2024-02-02 LAB — STFR SERPL-MCNC: 5 MG/L

## 2024-02-04 LAB — NONINV COLON CA DNA+OCC BLD SCRN STL QL: NEGATIVE

## 2024-02-09 NOTE — TELEPHONE ENCOUNTER
Patient Quality Outreach    Patient is due for the following:   Breast Cancer Screening - Mammogram    Next Steps:   Patient was scheduled for a 3D mammogram on 2/19/24 at [place].    Type of outreach:    Phone, spoke to patient/parent. patient      Questions for provider review:    None           Andrea Behrend

## 2024-02-21 ENCOUNTER — TRANSCRIBE ORDERS (OUTPATIENT)
Dept: OTHER | Age: 59
End: 2024-02-21

## 2024-02-21 DIAGNOSIS — D68.59 PRIMARY HYPERCOAGULABLE STATE (H): Primary | ICD-10-CM

## 2024-03-01 DIAGNOSIS — F33.0 MILD EPISODE OF RECURRENT MAJOR DEPRESSIVE DISORDER (H): ICD-10-CM

## 2024-03-01 NOTE — TELEPHONE ENCOUNTER
Medication Question or Refill    Contacts         Type Contact Phone/Fax    03/01/2024 03:37 PM CST Phone (Incoming) Luis Centenoendolyn DAYSI (Self) [phone] (M)            What medication are you calling about (include dose and sig)?:   DULoxetine (CYMBALTA) 30 MG capsule     Preferred Pharmacy:     [facility] [address] [place] [id number]  Phone: [phone] Fax: [phone]    Controlled Substance Agreement on file:   CSA -- Patient Level:    CSA: None found at the patient level.       Who prescribed the medication?: PCP    Do you need a refill? Yes    When did you use the medication last? NA    Patient offered an appointment? No    Do you have any questions or concerns?  Yes: Please fax prescription to [facility] pharmacy since they are still having issue with their system. Thanks! PCP not in today, routing to [facility].

## 2024-03-04 ENCOUNTER — TELEPHONE (OUTPATIENT)
Dept: FAMILY MEDICINE | Facility: CLINIC | Age: 59
End: 2024-03-04
Payer: COMMERCIAL

## 2024-03-04 DIAGNOSIS — F33.0 MILD EPISODE OF RECURRENT MAJOR DEPRESSIVE DISORDER (H): ICD-10-CM

## 2024-03-04 DIAGNOSIS — R06.2 WHEEZING: ICD-10-CM

## 2024-03-04 RX ORDER — ALBUTEROL SULFATE 90 UG/1
AEROSOL, METERED RESPIRATORY (INHALATION)
Qty: 18 G | Refills: 0 | Status: SHIPPED | OUTPATIENT
Start: 2024-03-04 | End: 2024-05-13

## 2024-03-04 RX ORDER — DULOXETIN HYDROCHLORIDE 30 MG/1
30 CAPSULE, DELAYED RELEASE ORAL 2 TIMES DAILY
Qty: 180 CAPSULE | Refills: 3 | Status: SHIPPED | OUTPATIENT
Start: 2024-03-04

## 2024-03-04 RX ORDER — DULOXETIN HYDROCHLORIDE 30 MG/1
30 CAPSULE, DELAYED RELEASE ORAL 2 TIMES DAILY
Qty: 180 CAPSULE | Refills: 3 | Status: SHIPPED | OUTPATIENT
Start: 2024-03-04 | End: 2024-03-04

## 2024-03-04 NOTE — TELEPHONE ENCOUNTER
General Call      Reason for Call:  Requesting provider send a support letter to insurance company to have incontinence briefs be covered.     What are your questions or concerns:  urinary incontinence    Date of last appointment with provider: 1/31/24.  Was a no-show to follow up appt scheduled for 2/29/24. Patient reported no ride arranged with medical transportation.     Could we send this information to you [facility] or would you prefer to receive a phone call?:   Patient would prefer a phone call   Okay to leave a detailed message?: Yes at Home number on file [phone] (home)      A letter pended for provider to review and send to patient at his approval.    MONIQUE QueenN, RN  [facility]

## 2024-03-04 NOTE — LETTER
March 4, 2024      Sridevi Centeno  [address]   [place] [address]        To Whom It May Concern,           Sridevi has been my patient for many years.  She has a long history of urinary urgency and urge incontinence.  Her incontinence supplies should be covered by her medical insurance.           Sincerely,           Jaime Keane MD

## 2024-03-04 NOTE — TELEPHONE ENCOUNTER
Medication Question or Refill        What medication are you calling about (include dose and sig)?: albuterol (PROAIR HFA/PROVENTIL HFA/VENTOLIN HFA) 108 (90 Base) MCG/ACT inhaler     Preferred Pharmacy:  [facility] [address] [place], [place] [address], [place] [facility] [address] [facility]  [address] [place] [id number]  Phone: [phone] Fax: [phone]      Controlled Substance Agreement on file:   CSA -- Patient Level:    CSA: None found at the patient level.       Who prescribed the medication?: PCP    Do you need a refill? Yes    When did you use the medication last? N/A    Patient offered an appointment? No    Do you have any questions or concerns?  Yes: Please fax prescription to [facility] pharmacy since they are still having issue with their system. Thanks!       Could we send this information to you in [facility] or would you prefer to receive a phone call?:   Patient would prefer a phone call   Okay to leave a detailed message?: Yes at Home number on file [phone] (home)

## 2024-03-07 ENCOUNTER — TELEPHONE (OUTPATIENT)
Dept: HEMATOLOGY | Facility: CLINIC | Age: 59
End: 2024-03-07
Payer: COMMERCIAL

## 2024-03-07 NOTE — TELEPHONE ENCOUNTER
[id number]  Sridevi DAYSI Centeno  58 year old female  CBCD Diagnosis: VTE  CBCD Provider: Emery Schwartz      This patient was on with Emery's schedule,  but needed to reschedule. She is on for 4/4/24.     She said Emery had told her that whatever test she is doing at the appointment would be $500. She said her other doctor told her that didn't make sense.     Was asked to review this with patient and answer any questions she may have. Left message earlier this week and then again today.  Have not heard back from patient.  Renetta Morocho, MSN, RN, PHN -Nurse Clinician, [facility] [phone]

## 2024-04-29 DIAGNOSIS — I26.92 ACUTE SADDLE PULMONARY EMBOLISM, UNSPECIFIED WHETHER ACUTE COR PULMONALE PRESENT (H): ICD-10-CM

## 2024-04-29 RX ORDER — APIXABAN 5 MG/1
5 TABLET, FILM COATED ORAL 2 TIMES DAILY
Qty: 180 TABLET | Refills: 0 | Status: SHIPPED | OUTPATIENT
Start: 2024-04-29 | End: 2024-06-07

## 2024-05-11 DIAGNOSIS — R06.2 WHEEZING: ICD-10-CM

## 2024-05-13 ENCOUNTER — TELEPHONE (OUTPATIENT)
Dept: FAMILY MEDICINE | Facility: CLINIC | Age: 59
End: 2024-05-13
Payer: COMMERCIAL

## 2024-05-13 RX ORDER — ALBUTEROL SULFATE 90 UG/1
AEROSOL, METERED RESPIRATORY (INHALATION)
Qty: 18 G | Refills: 0 | Status: SHIPPED | OUTPATIENT
Start: 2024-05-13

## 2024-05-13 NOTE — TELEPHONE ENCOUNTER
Patient Quality Outreach    Patient is due for the following:   Breast Cancer Screening - Mammogram    Next Steps:   Schedule a Adult Preventative    Type of outreach:    Sent Celframe message.      Questions for provider review:    None           Andrea Behrend

## 2024-05-23 ENCOUNTER — TELEPHONE (OUTPATIENT)
Dept: NEUROLOGY | Facility: CLINIC | Age: 59
End: 2024-05-23
Payer: COMMERCIAL

## 2024-05-23 NOTE — TELEPHONE ENCOUNTER
Left appointment reminder for patient. Instructed to call [phone] if this appointment needs to be changed or rescheduled BH

## 2024-05-28 NOTE — TELEPHONE ENCOUNTER
Closing encounter.  Automated letters were sent to patients who had their last mammogram over 2 years ago.  It is not documented in their chart.

## 2024-05-29 NOTE — PROGRESS NOTES
[facility]  [address] [place] [id number]  Main: [phone], Fax: [phone]    Patient seen at: [facility] at [address] [place] [address]    Outpatient Visit Note:    Patient: Sridevi Centeno  MRN: [id number]  : 1965  MARGARITA: 2024  Location of this writer at the time of this clinic visit was conducted: [facility].  Location of the patient at the time of this clinic visit was conducted: [facility].     Reason for visit:  Saddle pulmonary embolism on 2023.      Clinical History Summary:  Sridevi is a 58 year old female with a history of CKD stage 2-3, anxiety, obesity, chronic narcotic dependency, who was found to have a saddle pulmonary embolism on 2023, currently on apixaban at 5 mg PO BID dosing, returns to clinic today for her follow up. She was last seen by this writer back on 2023.    Thrombosis History Summary:  Sridevi has a history of severe disc degeneration and facet arthritis of the L3-S1. She recalls that back on labor day weekend (2023 through 2023), she was experiencing severe back pain to the point that she was basically entirely bed bound for several days.   2023, she had her first syncopal episode after filling the dog bowl with water. She denies any long distance travelling, traumatic injuries or any hospitalization or surgery in the month of Aug 2023.   2023, she presented to the emergency department within the [facility] with a 2 days history of tight chest pain and dyspnea on exertion as well as syncopal episodes on 2023 and was brought to the emergency department by EMS. At the time, a D-Dimer was noted to be 12.68 (normal <0.5). CTA chest showed saddle pulmonary embolism with extension into lobar, segmental, and subsegmental  arterial branches in the right upper, right middle, and right lower lobes and the left upper lobar arterial branch. It also showed enlarged main pulmonary artery measures 3.7 cm in diameter consistent with right heart strain. She urgently underwent successful bilateral pulmonary angiography and mechanical thrombectomy on 9/9/2023. Bilateral lower extremity venous ultrasound done on 9/9/2023 showed no DVT in either lower extremities. She was transitioned from unfractionated heparin to apixaban on 9/9/2023 and was discharged on 9/10/2023. During her hospitalization, she is also found to have a prolonged QT. Seen by cardiology and planned follow up as an outpatient. At time of discharged, she was placed on 2L of supplemental oxygen.   9/12/2023, she returned to the emergency department after a fall at home. Apparently she ran out of oxygen in the tank and after climbing up a flight of stairs, she passed out. Admitted overnight for observation and was discharged home. Repeat CTA chest was done showing interval clearing of saddle pulmonary embolism with bilateral central and subsegmental pulmonary emboli appearing decreased. Largest main pulmonary artery measuring 4.1 cm.  10/18/2023, she had a visit with Dr. Rex Guerin of Neurosurgery at [facility] for severe back pain and found to have severe disc degeneration and facet arthritis of L3-S1. Dr. Guerin discussed surgical options with the patient but because of her recent pulmonary embolism, she is referred by Dr. Guerin to our clinic for consultation.   10/25/2023, she was seen by Cardiology. They determined that recent echocardiogram shoed resolution of RV dilation and dysfunction and they agree with hematology evaluation. Cardiology determined that her pulmonary embolic event was an unprovoked event.      Interim History:  11/14/2023, she established care with this writer for which I determined that her pulmonary embolic event was likely a provoked event.  However, she did report a strong family history of venous thromboembolism with her paternal aunt's pulmonary embolic event being fatal. Even though her pulmonary embolic event was a provoked event, given her family history, it was my recommendation that she should consider staying on indefinite anticoagulation therapy. We also did discuss possible thrombophilia workup but defer at the time. I also recommended deferring her lumbar spinal surgery at least until after 3/8/2024.   2/21/2024, Sridevi has a follow up visit with Dr. Rex Guerin of Neurosurgery about her L3-S1 severe disc degenerative arthritis. Dr. Guerin at the time recommending that she should be tested for thrombophilia and asked the patient to return for follow up with this writer.     Currently she is on apixaban at 5 mg PO BID dosing. Sridevi reports no bleeding complications on apixaban.     She continues to complaint of low back pain and would like to proceed with spinal surgery as soon as possible.     ROS:  Denies any bleeding complications. Specifically, no frequent epistaxis. No issues with oral mucosal bleeding. Denies any hematuria or blood in stools. Denies any shortness of breath. No chest pain. No cough. No fever.      Medications:  Current Outpatient Medications   Medication Sig Dispense Refill    albuterol (PROAIR HFA/PROVENTIL HFA/VENTOLIN HFA) 108 (90 Base) MCG/ACT inhaler Inhale 2 puffs into the lungs every 6 hours 18 g 0    apixaban ANTICOAGULANT (ELIQUIS ANTICOAGULANT) 5 MG tablet Take 1 tablet (5 mg) by mouth 2 times daily 180 tablet 0    aspirin-acetaminophen-caffeine (EXCEDRIN MIGRAINE) 250-250-65 MG tablet Take 1 tablet by mouth daily as needed for headaches      DULoxetine (CYMBALTA) 30 MG capsule Take 1 capsule (30 mg) by mouth 2 times daily 180 capsule 3    ferrous sulfate (FEROSUL) 325 (65 Fe) MG tablet Take 1 tablet (325 mg) by mouth every other day 90 tablet 1    oxyCODONE-acetaminophen (PERCOCET)  MG per tablet  "Take 1 tablet by mouth every 4 hours as needed for severe pain      OXYCONTIN 10 MG 12 hr tablet TAKE ONE TABLET BY MOUTH TWICE A DAY AS DIRECTED      Pregabalin (LYRICA) 200 MG capsule        No current facility-administered medications for this visit.     Allergies:  Allergies   Allergen Reactions    Hydrocodone-Acetaminophen GI Disturbance and Nausea    Ibuprofen Nausea     PmHx:  Past Medical History:   Diagnosis Date    Anxiety disorder     Arthritis     Bladder wall thickening     Brachial neuritis or radiculitis     Cervical spinal stenosis     Chronic pain     Constipation     Degenerative cervical disc     Edema     Generalized anxiety disorder     Hematuria     Hydronephrosis     right    Hypercholesteremia     Hypercholesteremia     Hypertension     Hypertension     Hypokalemia     Hypomagnesemia     Lumbar radiculopathy     Major depression     Osteoarthritis     Patellofemoral syndrome     left knee    Prinzmetal's angina (H24)     Prinzmetal's angina (H24) 1/1/2003    Surgical menopause     Urge incontinence of urine        Social History:   Deferred.    Family History:  Deferred.    Objective:  Vitals: BP (!) 157/101   Pulse 73   Temp 98.2  F (36.8  C) (Oral)   Ht 1.6 m (5' 3\")   Wt 111.1 kg (244 lb 14.4 oz)   LMP  (LMP Unknown)   SpO2 99%   BMI 43.38 kg/m    Exam:   Complete exam is not performed today.       Assessment:  In summary, Sridevi is a 58 year old female with a history of CKD stage 2-3, anxiety, obesity, chronic narcotic dependency, who was found to have a saddle pulmonary embolism on 9/8/2023, who is currently on apixaban at 5 mg PO BID dosing, returns to clinic today for her follow up.     Sridevi's saddle pulmonary embolic event back in 9/8/2023 was likely a provoked event as she recalls that she was basically bed bound for several days between 9/2/2023 to 9/5/2023 because of severe back pain. Her first syncopal episode occurred on 9/7/2023 which is very consistent with the " timing of her immobilization. HOWEVER, as mentioned above, her pulmonary embolic event was a life-threatening event and one of her paternal aunts  of a pulmonary embolic event 3-4 years ago and her first cousin (the daughter of her aunt who  of PE) also has a history of pulmonary embolism.      To complicate matter here, Sridevi is found to have severe disc degenerative disease of the lumbar spine and sacral spine for which she was initially planned to undergo extensive spinal surgery by Dr. Rex Guerin of [facility]. But because of her recent pulmonary embolic event, this surgery obviously has been cancelled for the time being. Additionally, she is noted to be anemic for at least the past 5 years and this is being managed by her primary care provider including the fact that this patient has never had a colonoscopy done in the past.      Diagnosis:  Saddle pulmonary embolism back on 2023 S/P urgent mechanical thrombectomy. This was likely a provoked event as described above.   Family history of fatal pulmonary embolism (paternal aunt).   Family history of pulmonary embolism (first cousin, daughter of the aunt who had a fatal PE event).   Severe lumbar and sacral spinal disc degenerative disease.   Chronic pain.   Chronic kidney disease, Stage 2-3.   Anemia.     Plan:  As explained in my previous note back in 2023, although I do belief that her venous thromboembolic event back in 2023 was a provoked event secondary to the fact that she was basically immobilized due to pain for several days prior to her diagnosis of pulmonary embolism, the fact that her pulmonary embolism was rather severe and that she does have a family history of fatal pulmonary embolic event placed Sridevi at risk for recurrent venous thromboembolic event in the future. Thus it remains to be my recommendation that she should remain on indefinite anticoagulation therapy. The result of a thrombophilia workup including  factor V Leiden mutation, prothrombin gene mutation, antithrombin III level, protein C level and protein S levels, will NOT change my recommendation that she should remain on indefinite anticoagulation therapy. In other words, if she should tested positive for any of these thrombophilia, it argues stronger that she should remain on indefinite anticoagulation therapy. If she were to be tested negative for any of these thrombophilia, my recommendation will remain that she should stay on indefinite anticoagulation therapy based what I have already explained above. I discuss this with the patient today and she would still like to proceed with the testing. Thus I have ordered factor V Leiden mutation, prothrombin gene mutation, protein S level, protein C level and antithrombin III levels today. I am NOT able to perform the lupus anticoagulant (suggested by Dr. Guerin) as this patient is on apixaban, which will cause a false positive lupus anticoagulant test.     Sridevi has done well in the past 9 months on apixaban and thus I will keep her on this. She is to continue apixaban at 5 mg PO BID dosing. I have renewed her apixaban prescription with a one year refill.     We have no opposition from a hematological standpoint for the patient to proceed with lumber spinal surgery as she has already completed 9 months of uninterrupted anticoagulation therapy at this time.     For her spinal surgery, my recommendation on her anticoagulation therapy management perioperatively is as follow:  Discontinue apixaban 48 hours prior to her surgery. Apixaban has a relatively short half life. 48 hours hold of the medication should be suffice for normal hemostasis with major surgeries.   Post operatively, I would recommend that she is to be placed on unfractionated heparin per hospital protocol starting 12-24 hours post operatively assuming that she has no bleeding complications. Unfractionated heparin would allow reversal if she should  found to have any bleeding complications. I would recommend that she stays on unfractionated heparin until the surgical team felt that she no longer at risk for bleeding, then she can transition back to apixaban at 5 mg PO BID dosing.     Patient is instructed to call our clinic if she should experience any unusual bleeding complications or if she should need any other invasive or surgical procedures in the future. Otherwise, will plan to see her back in one year for routine follow up.         Emery Schwartz PA-C, MPAS  Physician Assistant  [facility].     The longitudinal plan of care for these conditions below were addressed during this visit. Due to the added complexity in care, I will continue to support Sridevi Centeno  in the subsequent management of these conditions and with the ongoing continuity of care of these conditions.    Problem List Items Addressed This Visit as of 2/19/2024   Saddle pulmonary embolism back on 9/8/2023 S/P urgent mechanical thrombectomy. This was likely a provoked event as described above.   Family history of fatal pulmonary embolism (paternal aunt).   Family history of pulmonary embolism (first cousin, daughter of the aunt who had a fatal PE event).   Severe lumbar and sacral spinal disc degenerative disease.   Chronic pain.   Chronic kidney disease, Stage 2-3.   Anemia.     40 minutes spent by me on the date of the encounter doing chart review, history and exam, documentation and further activities per the note.    Time IN: 12:59  Time OUT: 13:20

## 2024-06-07 ENCOUNTER — OFFICE VISIT (OUTPATIENT)
Dept: HEMATOLOGY | Facility: CLINIC | Age: 59
End: 2024-06-07
Attending: PHYSICIAN ASSISTANT
Payer: COMMERCIAL

## 2024-06-07 VITALS
TEMPERATURE: 98.2 F | OXYGEN SATURATION: 99 % | SYSTOLIC BLOOD PRESSURE: 157 MMHG | DIASTOLIC BLOOD PRESSURE: 101 MMHG | HEIGHT: 63 IN | HEART RATE: 73 BPM | WEIGHT: 244.9 LBS | BODY MASS INDEX: 43.39 KG/M2

## 2024-06-07 DIAGNOSIS — Z79.01 CHRONIC ANTICOAGULATION: ICD-10-CM

## 2024-06-07 DIAGNOSIS — I26.92 ACUTE SADDLE PULMONARY EMBOLISM, UNSPECIFIED WHETHER ACUTE COR PULMONALE PRESENT (H): ICD-10-CM

## 2024-06-07 DIAGNOSIS — Z82.49 FAMILY HISTORY OF PULMONARY EMBOLISM: ICD-10-CM

## 2024-06-07 DIAGNOSIS — Z86.718 PERSONAL HISTORY OF DVT (DEEP VEIN THROMBOSIS): Primary | ICD-10-CM

## 2024-06-07 DIAGNOSIS — Z86.711 HISTORY OF PULMONARY EMBOLISM: ICD-10-CM

## 2024-06-07 LAB
FACTOR 2 INTERPRETATION: NORMAL
FACTOR V INTERPRETATION: NORMAL
LAB DIRECTOR COMMENTS: NORMAL
LAB DIRECTOR DISCLAIMER: NORMAL
LAB DIRECTOR INTERPRETATION: NORMAL
LAB DIRECTOR METHODOLOGY: NORMAL
LAB DIRECTOR RESULTS: NORMAL
SPECIMEN DESCRIPTION: NORMAL

## 2024-06-07 PROCEDURE — G2211 COMPLEX E/M VISIT ADD ON: HCPCS | Performed by: PHYSICIAN ASSISTANT

## 2024-06-07 PROCEDURE — G0452 MOLECULAR PATHOLOGY INTERPR: HCPCS | Mod: 26 | Performed by: PATHOLOGY

## 2024-06-07 PROCEDURE — G0463 HOSPITAL OUTPT CLINIC VISIT: HCPCS | Performed by: PHYSICIAN ASSISTANT

## 2024-06-07 PROCEDURE — 81241 F5 GENE: CPT | Performed by: PHYSICIAN ASSISTANT

## 2024-06-07 PROCEDURE — 36415 COLL VENOUS BLD VENIPUNCTURE: CPT | Performed by: PHYSICIAN ASSISTANT

## 2024-06-07 PROCEDURE — 99215 OFFICE O/P EST HI 40 MIN: CPT | Performed by: PHYSICIAN ASSISTANT

## 2024-06-07 PROCEDURE — 85306 CLOT INHIBIT PROT S FREE: CPT | Performed by: PHYSICIAN ASSISTANT

## 2024-06-07 PROCEDURE — 85300 ANTITHROMBIN III ACTIVITY: CPT | Performed by: PHYSICIAN ASSISTANT

## 2024-06-07 PROCEDURE — 85303 CLOT INHIBIT PROT C ACTIVITY: CPT | Performed by: PHYSICIAN ASSISTANT

## 2024-06-07 NOTE — LETTER
[facility]  [address] [place] [id number]  Main: [phone], Fax: [phone]    Patient seen at: [facility] at [address] [place] [address]    Outpatient Visit Note:    Patient: Sridevi Centeno  MRN: [id number]  : 1965  MARGARITA: 2024  Location of this writer at the time of this clinic visit was conducted: [facility].  Location of the patient at the time of this clinic visit was conducted: [facility].     Reason for visit:  Saddle pulmonary embolism on 2023.      Clinical History Summary:  Sridevi is a 58 year old female with a history of CKD stage 2-3, anxiety, obesity, chronic narcotic dependency, who was found to have a saddle pulmonary embolism on 2023, currently on apixaban at 5 mg PO BID dosing, returns to clinic today for her follow up. She was last seen by this writer back on 2023.    Thrombosis History Summary:  Sridevi has a history of severe disc degeneration and facet arthritis of the L3-S1. She recalls that back on labor day weekend (2023 through 2023), she was experiencing severe back pain to the point that she was basically entirely bed bound for several days.   2023, she had her first syncopal episode after filling the dog bowl with water. She denies any long distance travelling, traumatic injuries or any hospitalization or surgery in the month of Aug 2023.   2023, she presented to the emergency department within the [facility] with a 2 days history of tight chest pain and dyspnea on exertion as well as syncopal episodes on 2023 and was brought to the emergency department by EMS. At the time, a D-Dimer was noted to be 12.68 (normal <0.5). CTA chest showed saddle pulmonary embolism with extension into lobar, segmental, and subsegmental  arterial branches in the right upper, right middle, and right lower lobes and the left upper lobar arterial branch. It also showed enlarged main pulmonary artery measures 3.7 cm in diameter consistent with right heart strain. She urgently underwent successful bilateral pulmonary angiography and mechanical thrombectomy on 9/9/2023. Bilateral lower extremity venous ultrasound done on 9/9/2023 showed no DVT in either lower extremities. She was transitioned from unfractionated heparin to apixaban on 9/9/2023 and was discharged on 9/10/2023. During her hospitalization, she is also found to have a prolonged QT. Seen by cardiology and planned follow up as an outpatient. At time of discharged, she was placed on 2L of supplemental oxygen.   9/12/2023, she returned to the emergency department after a fall at home. Apparently she ran out of oxygen in the tank and after climbing up a flight of stairs, she passed out. Admitted overnight for observation and was discharged home. Repeat CTA chest was done showing interval clearing of saddle pulmonary embolism with bilateral central and subsegmental pulmonary emboli appearing decreased. Largest main pulmonary artery measuring 4.1 cm.  10/18/2023, she had a visit with Dr. Rex Guerin of Neurosurgery at [facility] for severe back pain and found to have severe disc degeneration and facet arthritis of L3-S1. Dr. Guerin discussed surgical options with the patient but because of her recent pulmonary embolism, she is referred by Dr. Guerin to our clinic for consultation.   10/25/2023, she was seen by Cardiology. They determined that recent echocardiogram shoed resolution of RV dilation and dysfunction and they agree with hematology evaluation. Cardiology determined that her pulmonary embolic event was an unprovoked event.      Interim History:  11/14/2023, she established care with this writer for which I determined that her pulmonary embolic event was likely a provoked event.  However, she did report a strong family history of venous thromboembolism with her paternal aunt's pulmonary embolic event being fatal. Even though her pulmonary embolic event was a provoked event, given her family history, it was my recommendation that she should consider staying on indefinite anticoagulation therapy. We also did discuss possible thrombophilia workup but defer at the time. I also recommended deferring her lumbar spinal surgery at least until after 3/8/2024.   2/21/2024, Sridevi has a follow up visit with Dr. Rex Guerin of Neurosurgery about her L3-S1 severe disc degenerative arthritis. Dr. Guerin at the time recommending that she should be tested for thrombophilia and asked the patient to return for follow up with this writer.     Currently she is on apixaban at 5 mg PO BID dosing. Sridevi reports no bleeding complications on apixaban.     She continues to complaint of low back pain and would like to proceed with spinal surgery as soon as possible.     ROS:  Denies any bleeding complications. Specifically, no frequent epistaxis. No issues with oral mucosal bleeding. Denies any hematuria or blood in stools. Denies any shortness of breath. No chest pain. No cough. No fever.      Medications:  Current Outpatient Medications   Medication Sig Dispense Refill     albuterol (PROAIR HFA/PROVENTIL HFA/VENTOLIN HFA) 108 (90 Base) MCG/ACT inhaler Inhale 2 puffs into the lungs every 6 hours 18 g 0     apixaban ANTICOAGULANT (ELIQUIS ANTICOAGULANT) 5 MG tablet Take 1 tablet (5 mg) by mouth 2 times daily 180 tablet 0     aspirin-acetaminophen-caffeine (EXCEDRIN MIGRAINE) 250-250-65 MG tablet Take 1 tablet by mouth daily as needed for headaches       DULoxetine (CYMBALTA) 30 MG capsule Take 1 capsule (30 mg) by mouth 2 times daily 180 capsule 3     ferrous sulfate (FEROSUL) 325 (65 Fe) MG tablet Take 1 tablet (325 mg) by mouth every other day 90 tablet 1     oxyCODONE-acetaminophen (PERCOCET)  MG per  "tablet Take 1 tablet by mouth every 4 hours as needed for severe pain       OXYCONTIN 10 MG 12 hr tablet TAKE ONE TABLET BY MOUTH TWICE A DAY AS DIRECTED       Pregabalin (LYRICA) 200 MG capsule        No current facility-administered medications for this visit.     Allergies:  Allergies   Allergen Reactions     Hydrocodone-Acetaminophen GI Disturbance and Nausea     Ibuprofen Nausea     PmHx:  Past Medical History:   Diagnosis Date     Anxiety disorder      Arthritis      Bladder wall thickening      Brachial neuritis or radiculitis      Cervical spinal stenosis      Chronic pain      Constipation      Degenerative cervical disc      Edema      Generalized anxiety disorder      Hematuria      Hydronephrosis     right     Hypercholesteremia      Hypercholesteremia      Hypertension      Hypertension      Hypokalemia      Hypomagnesemia      Lumbar radiculopathy      Major depression      Osteoarthritis      Patellofemoral syndrome     left knee     Prinzmetal's angina (H24)      Prinzmetal's angina (H24) 1/1/2003     Surgical menopause      Urge incontinence of urine        Social History:   Deferred.    Family History:  Deferred.    Objective:  Vitals: BP (!) 157/101   Pulse 73   Temp 98.2  F (36.8  C) (Oral)   Ht 1.6 m (5' 3\")   Wt 111.1 kg (244 lb 14.4 oz)   LMP  (LMP Unknown)   SpO2 99%   BMI 43.38 kg/m    Exam:   Complete exam is not performed today.       Assessment:  In summary, Sridevi is a 58 year old female with a history of CKD stage 2-3, anxiety, obesity, chronic narcotic dependency, who was found to have a saddle pulmonary embolism on 9/8/2023, who is currently on apixaban at 5 mg PO BID dosing, returns to clinic today for her follow up.     Sridevi's saddle pulmonary embolic event back in 9/8/2023 was likely a provoked event as she recalls that she was basically bed bound for several days between 9/2/2023 to 9/5/2023 because of severe back pain. Her first syncopal episode occurred on " 2023 which is very consistent with the timing of her immobilization. HOWEVER, as mentioned above, her pulmonary embolic event was a life-threatening event and one of her paternal aunts  of a pulmonary embolic event 3-4 years ago and her first cousin (the daughter of her aunt who  of PE) also has a history of pulmonary embolism.      To complicate matter here, Sridevi is found to have severe disc degenerative disease of the lumbar spine and sacral spine for which she was initially planned to undergo extensive spinal surgery by Dr. Rex Guerin of [facility]. But because of her recent pulmonary embolic event, this surgery obviously has been cancelled for the time being. Additionally, she is noted to be anemic for at least the past 5 years and this is being managed by her primary care provider including the fact that this patient has never had a colonoscopy done in the past.      Diagnosis:  Saddle pulmonary embolism back on 2023 S/P urgent mechanical thrombectomy. This was likely a provoked event as described above.   Family history of fatal pulmonary embolism (paternal aunt).   Family history of pulmonary embolism (first cousin, daughter of the aunt who had a fatal PE event).   Severe lumbar and sacral spinal disc degenerative disease.   Chronic pain.   Chronic kidney disease, Stage 2-3.   Anemia.     Plan:  As explained in my previous note back in 2023, although I do belief that her venous thromboembolic event back in 2023 was a provoked event secondary to the fact that she was basically immobilized due to pain for several days prior to her diagnosis of pulmonary embolism, the fact that her pulmonary embolism was rather severe and that she does have a family history of fatal pulmonary embolic event placed Sridevi at risk for recurrent venous thromboembolic event in the future. Thus it remains to be my recommendation that she should remain on indefinite anticoagulation therapy. The  result of a thrombophilia workup including factor V Leiden mutation, prothrombin gene mutation, antithrombin III level, protein C level and protein S levels, will NOT change my recommendation that she should remain on indefinite anticoagulation therapy. In other words, if she should tested positive for any of these thrombophilia, it argues stronger that she should remain on indefinite anticoagulation therapy. If she were to be tested negative for any of these thrombophilia, my recommendation will remain that she should stay on indefinite anticoagulation therapy based what I have already explained above. I discuss this with the patient today and she would still like to proceed with the testing. Thus I have ordered factor V Leiden mutation, prothrombin gene mutation, protein S level, protein C level and antithrombin III levels today. I am NOT able to perform the lupus anticoagulant (suggested by Dr. Guerin) as this patient is on apixaban, which will cause a false positive lupus anticoagulant test.     Sridevi has done well in the past 9 months on apixaban and thus I will keep her on this. She is to continue apixaban at 5 mg PO BID dosing. I have renewed her apixaban prescription with a one year refill.     We have no opposition from a hematological standpoint for the patient to proceed with lumber spinal surgery as she has already completed 9 months of uninterrupted anticoagulation therapy at this time.     For her spinal surgery, my recommendation on her anticoagulation therapy management perioperatively is as follow:  Discontinue apixaban 48 hours prior to her surgery. Apixaban has a relatively short half life. 48 hours hold of the medication should be suffice for normal hemostasis with major surgeries.   Post operatively, I would recommend that she is to be placed on unfractionated heparin per hospital protocol starting 12-24 hours post operatively assuming that she has no bleeding complications. Unfractionated  heparin would allow reversal if she should found to have any bleeding complications. I would recommend that she stays on unfractionated heparin until the surgical team felt that she no longer at risk for bleeding, then she can transition back to apixaban at 5 mg PO BID dosing.     Patient is instructed to call our clinic if she should experience any unusual bleeding complications or if she should need any other invasive or surgical procedures in the future. Otherwise, will plan to see her back in one year for routine follow up.         Emery Schwartz PA-C, MPAS  Physician Assistant  [facility].     The longitudinal plan of care for these conditions below were addressed during this visit. Due to the added complexity in care, I will continue to support Sridevi Centeno  in the subsequent management of these conditions and with the ongoing continuity of care of these conditions.    Problem List Items Addressed This Visit as of 2/19/2024   Saddle pulmonary embolism back on 9/8/2023 S/P urgent mechanical thrombectomy. This was likely a provoked event as described above.   Family history of fatal pulmonary embolism (paternal aunt).   Family history of pulmonary embolism (first cousin, daughter of the aunt who had a fatal PE event).   Severe lumbar and sacral spinal disc degenerative disease.   Chronic pain.   Chronic kidney disease, Stage 2-3.   Anemia.     40 minutes spent by me on the date of the encounter doing chart review, history and exam, documentation and further activities per the note.    Time IN: 12:59  Time OUT: 13:20

## 2024-06-10 LAB
AT III ACT/NOR PPP CHRO: 105 % (ref 85–135)
PROT C ACT/NOR PPP CHRO: 163 % (ref 70–170)
PROT S FREE AG ACT/NOR PPP IA: 119 % (ref 55–125)

## 2024-08-10 ENCOUNTER — HEALTH MAINTENANCE LETTER (OUTPATIENT)
Age: 59
End: 2024-08-10

## 2024-08-12 ENCOUNTER — TELEPHONE (OUTPATIENT)
Dept: FAMILY MEDICINE | Facility: CLINIC | Age: 59
End: 2024-08-12
Payer: COMMERCIAL

## 2024-08-12 NOTE — TELEPHONE ENCOUNTER
Patient Quality Outreach    Patient is due for the following:   Breast Cancer Screening - Mammogram    Next Steps:   Schedule a Adult Preventative    Type of outreach:    Sent [employer] message.      Questions for provider review:    None           Andrea Behrend

## 2024-08-26 NOTE — TELEPHONE ENCOUNTER
Patient Quality Outreach    Patient is due for the following:   Breast Cancer Screening - Mammogram    Next Steps:   Patient was scheduled for a mammogram at [facility] on 9/5/24.    Type of outreach:    Phone, spoke to patient/parent. patient      Questions for provider review:    None           Andrea Behrend

## 2024-10-19 ENCOUNTER — HEALTH MAINTENANCE LETTER (OUTPATIENT)
Age: 59
End: 2024-10-19

## 2024-11-26 ENCOUNTER — TELEPHONE (OUTPATIENT)
Dept: FAMILY MEDICINE | Facility: CLINIC | Age: 59
End: 2024-11-26
Payer: COMMERCIAL

## 2024-11-26 NOTE — TELEPHONE ENCOUNTER
Patient Quality Outreach    Patient is due for the following:   Breast Cancer Screening - Mammogram    Action(s) Taken:   Schedule a Adult Preventative    Type of outreach:    Sent [employer] message.  Pt will be at [address] [place] [address] on 12/10/24.    Questions for provider review:    None           Andrea Behrend

## 2024-12-10 ENCOUNTER — ANCILLARY PROCEDURE (OUTPATIENT)
Dept: MAMMOGRAPHY | Facility: CLINIC | Age: 59
End: 2024-12-10
Attending: FAMILY MEDICINE
Payer: COMMERCIAL

## 2024-12-10 ENCOUNTER — OFFICE VISIT (OUTPATIENT)
Dept: FAMILY MEDICINE | Facility: CLINIC | Age: 59
End: 2024-12-10
Payer: COMMERCIAL

## 2024-12-10 VITALS
BODY MASS INDEX: 40.75 KG/M2 | OXYGEN SATURATION: 97 % | RESPIRATION RATE: 18 BRPM | WEIGHT: 230 LBS | HEIGHT: 63 IN | TEMPERATURE: 97 F | HEART RATE: 64 BPM

## 2024-12-10 DIAGNOSIS — D64.9 ANEMIA, UNSPECIFIED TYPE: ICD-10-CM

## 2024-12-10 DIAGNOSIS — Z12.31 VISIT FOR SCREENING MAMMOGRAM: ICD-10-CM

## 2024-12-10 DIAGNOSIS — F33.0 MILD EPISODE OF RECURRENT MAJOR DEPRESSIVE DISORDER (H): ICD-10-CM

## 2024-12-10 DIAGNOSIS — Z79.891 CHRONIC PRESCRIPTION OPIATE USE: ICD-10-CM

## 2024-12-10 DIAGNOSIS — E03.9 HYPOTHYROIDISM, UNSPECIFIED TYPE: ICD-10-CM

## 2024-12-10 DIAGNOSIS — E66.813 CLASS 3 SEVERE OBESITY WITH SERIOUS COMORBIDITY AND BODY MASS INDEX (BMI) OF 45.0 TO 49.9 IN ADULT, UNSPECIFIED OBESITY TYPE (H): ICD-10-CM

## 2024-12-10 DIAGNOSIS — Z09 HOSPITAL DISCHARGE FOLLOW-UP: Primary | ICD-10-CM

## 2024-12-10 DIAGNOSIS — N39.41 URGE INCONTINENCE: ICD-10-CM

## 2024-12-10 DIAGNOSIS — E66.01 CLASS 3 SEVERE OBESITY WITH SERIOUS COMORBIDITY AND BODY MASS INDEX (BMI) OF 45.0 TO 49.9 IN ADULT, UNSPECIFIED OBESITY TYPE (H): ICD-10-CM

## 2024-12-10 DIAGNOSIS — R06.2 WHEEZING: ICD-10-CM

## 2024-12-10 DIAGNOSIS — Z00.00 HEALTH CARE MAINTENANCE: ICD-10-CM

## 2024-12-10 DIAGNOSIS — Z59.819 HOUSING INSECURITY: ICD-10-CM

## 2024-12-10 DIAGNOSIS — A08.4 VIRAL GASTROENTERITIS: ICD-10-CM

## 2024-12-10 DIAGNOSIS — E78.00 HYPERCHOLESTEREMIA: ICD-10-CM

## 2024-12-10 DIAGNOSIS — F33.9 EPISODE OF RECURRENT MAJOR DEPRESSIVE DISORDER, UNSPECIFIED DEPRESSION EPISODE SEVERITY (H): ICD-10-CM

## 2024-12-10 PROBLEM — I26.92 ACUTE SADDLE PULMONARY EMBOLISM, UNSPECIFIED WHETHER ACUTE COR PULMONALE PRESENT (H): Status: RESOLVED | Noted: 2023-09-28 | Resolved: 2024-12-10

## 2024-12-10 LAB
ALT SERPL W P-5'-P-CCNC: 12 U/L (ref 0–50)
CHOLEST SERPL-MCNC: 192 MG/DL
FASTING STATUS PATIENT QL REPORTED: YES
FERRITIN SERPL-MCNC: 118 NG/ML (ref 11–328)
HDLC SERPL-MCNC: 65 MG/DL
IRON BINDING CAPACITY (ROCHE): 305 UG/DL (ref 240–430)
IRON SATN MFR SERPL: 22 % (ref 15–46)
IRON SERPL-MCNC: 68 UG/DL (ref 37–145)
LDLC SERPL CALC-MCNC: 99 MG/DL
NONHDLC SERPL-MCNC: 127 MG/DL
TRIGL SERPL-MCNC: 142 MG/DL
TSH SERPL DL<=0.005 MIU/L-ACNC: 1.5 UIU/ML (ref 0.3–4.2)

## 2024-12-10 PROCEDURE — 99417 PROLNG OP E/M EACH 15 MIN: CPT | Performed by: FAMILY MEDICINE

## 2024-12-10 PROCEDURE — 80061 LIPID PANEL: CPT | Performed by: FAMILY MEDICINE

## 2024-12-10 PROCEDURE — 82728 ASSAY OF FERRITIN: CPT | Performed by: FAMILY MEDICINE

## 2024-12-10 PROCEDURE — 99215 OFFICE O/P EST HI 40 MIN: CPT | Performed by: FAMILY MEDICINE

## 2024-12-10 PROCEDURE — 84443 ASSAY THYROID STIM HORMONE: CPT | Performed by: FAMILY MEDICINE

## 2024-12-10 PROCEDURE — 36415 COLL VENOUS BLD VENIPUNCTURE: CPT | Performed by: FAMILY MEDICINE

## 2024-12-10 PROCEDURE — 83550 IRON BINDING TEST: CPT | Performed by: FAMILY MEDICINE

## 2024-12-10 PROCEDURE — 77067 SCR MAMMO BI INCL CAD: CPT | Mod: TC | Performed by: RADIOLOGY

## 2024-12-10 PROCEDURE — 84460 ALANINE AMINO (ALT) (SGPT): CPT | Performed by: FAMILY MEDICINE

## 2024-12-10 PROCEDURE — 90480 ADMN SARSCOV2 VAC 1/ONLY CMP: CPT | Performed by: FAMILY MEDICINE

## 2024-12-10 PROCEDURE — 91320 SARSCV2 VAC 30MCG TRS-SUC IM: CPT | Performed by: FAMILY MEDICINE

## 2024-12-10 PROCEDURE — 77063 BREAST TOMOSYNTHESIS BI: CPT | Mod: TC | Performed by: RADIOLOGY

## 2024-12-10 PROCEDURE — 83540 ASSAY OF IRON: CPT | Performed by: FAMILY MEDICINE

## 2024-12-10 RX ORDER — ALBUTEROL SULFATE 90 UG/1
INHALANT RESPIRATORY (INHALATION)
Qty: 18 G | Refills: 0 | Status: SHIPPED | OUTPATIENT
Start: 2024-12-10

## 2024-12-10 RX ORDER — DULOXETIN HYDROCHLORIDE 30 MG/1
30 CAPSULE, DELAYED RELEASE ORAL 2 TIMES DAILY
Qty: 180 CAPSULE | Refills: 3 | Status: SHIPPED | OUTPATIENT
Start: 2024-12-10

## 2024-12-10 SDOH — ECONOMIC STABILITY - HOUSING INSECURITY: HOUSING INSTABILITY UNSPECIFIED: Z59.819

## 2024-12-10 ASSESSMENT — COLUMBIA-SUICIDE SEVERITY RATING SCALE - C-SSRS
2. IN THE PAST MONTH, HAVE YOU ACTUALLY HAD ANY THOUGHTS OF KILLING YOURSELF?: NO
1. WITHIN THE PAST MONTH, HAVE YOU WISHED YOU WERE DEAD OR WISHED YOU COULD GO TO SLEEP AND NOT WAKE UP?: YES
6. HAVE YOU EVER DONE ANYTHING, STARTED TO DO ANYTHING, OR PREPARED TO DO ANYTHING TO END YOUR LIFE?: NO

## 2024-12-10 ASSESSMENT — PATIENT HEALTH QUESTIONNAIRE - PHQ9
SUM OF ALL RESPONSES TO PHQ QUESTIONS 1-9: 8
10. IF YOU CHECKED OFF ANY PROBLEMS, HOW DIFFICULT HAVE THESE PROBLEMS MADE IT FOR YOU TO DO YOUR WORK, TAKE CARE OF THINGS AT HOME, OR GET ALONG WITH OTHER PEOPLE: VERY DIFFICULT
SUM OF ALL RESPONSES TO PHQ QUESTIONS 1-9: 8

## 2024-12-10 NOTE — ASSESSMENT & PLAN NOTE
Based on history, not dehydrated, diarrhea somewhat persistent, if lasting more than another week or 2, recommend testing for stool pathogens.

## 2024-12-10 NOTE — ASSESSMENT & PLAN NOTE
Indicates that she has had a flu shot, will give COVID.  Discussed Shingrix.  Yemi earlier this year, referral for mammogram

## 2024-12-10 NOTE — ASSESSMENT & PLAN NOTE
PHQ-9 is 9, has run out of duloxetine, will restart this.  Referral to therapist.  Passive suicidal ideation at times, see results of [place] today,  Referral

## 2024-12-10 NOTE — PROGRESS NOTES
Assessment/ Plan  Anemia, unspecified type  Recent hemoglobins have been 9 and 10, will repeat iron studies today.  If low iron, colonoscopy, could hold DOAC at this point.  If normal iron, consider other diagnosis,?  Hematology?  Patient had negative Cologuard-early this year    Chronic prescription opiate use  [facility] in [place],    Class 3 severe obesity with serious comorbidity and body mass index (BMI) of 45.0 to 49.9 in adult, unspecified obesity type (H)  Encouraging healthy eating, activity.  Could consider GLP-1 going forward.    Health care maintenance  Indicates that she has had a flu shot, will give COVID.  Discussed Shingrix.  Cologuard earlier this year, referral for mammogram    Hospital discharge follow-up  Hospitalized as outlined below  MED REC REQUIRED  Post Medication Reconciliation Status:  Unable to reconcile discharge medications      Housing insecurity  Indicates that she runs into problems because she insists on keeping her dog.  Agreeable to care coordination help.  Currently homeless living in a hotel/shelter    Major Depression, Recurrent  PHQ-9 is 9, has run out of duloxetine, will restart this.  Referral to therapist.  Passive suicidal ideation at times, see results of [place] today,  Referral    Urge Incontinence Of Urine  Previously evaluated by urology.  Really wants to pursue this further.  Will refer again,  Ordered urinary incontinence briefs.    Wheezing  Renewed albuterol.    Viral gastroenteritis  Based on history, not dehydrated, diarrhea somewhat persistent, if lasting more than another week or 2, recommend testing for stool pathogens.   Subjective  CC:  chief complaint  HPI:  Patient here for follow-up from emergency room visits.  Seen 11/18/2024 for rash, upper back, some burning.  Worried that she was bit by something.  Appeared that rash was an abrasion, essentially reassured.  Hospital admission 10/17 through 10/19/2024 [facility], brought in for falls.  Was  homeless, in an extended stay hotel, reported feeling anxious, fatigued, malaise, had not eaten much.  Reports several syncopal episodes.  Was noted to have some behavioral changes and erratic movements in ED, treated with Benadryl, urine drug screen negative except oxycodone which is a known medication.  Given IV fluids, started on levothyroxine, symptoms improved.  TCU recommended for more extended physical therapy but declined due to her need for staying with her dogs.  Duloxetine and Lyrica not restarted.  Thyroid ultrasound recommended, but I cannot find indication for this has apparently simple hypothyroidism.  No indication of nodule or mass.  It appears that hemoglobin was low at 9.0, lactate negative, BMP normal except elevated CO2 of 31.    Lots of questions, see above.  Worried about some indication of chronic kidney disease on the chart, looks like her last creatinines have been normal, reassured her regarding this, worried about her recent diagnosis of thyroid abnormality ACE, ongoing depression, worse with running out of duloxetine, indicates that this seems to help.  Has had vomiting and diarrhea over the last 2 weeks.  Took a trip to [place] and was around a lot of people that were sick with vomiting and diarrhea and thinks that may have been where she got sick.  Initially achiness and vomiting were predominant, then the vomiting got a little bit better, though she vomited once yesterday.  The diarrhea has been more persistent.  No blood or mucus in her stool and this is for the most part getting less frequent.  She is can continue to urinate normally.  Keeping fluids down  Depression Screening Follow Up        12/10/2024    10:27 AM   PHQ   PHQ-9 Total Score 8    Q9: Thoughts of better off dead/self-harm past 2 weeks Several days    F/U: Thoughts of suicide or self-harm No    F/U: Safety concerns No        Patient-reported                 12/10/2024    11:36 AM   C-SSRS ([facility])    Within the last month, have you wished you were dead or wished you could go to sleep and not wake up? Yes   Within the last month, have you had any actual thoughts of killing yourself? No   Within the last month, have you ever done anything, started to do anything, or prepared to do anything to end your life? No               Follow Up Actions Taken  Crisis resource information provided in the After Visit Summary  Mental Health Referral placed    Discussed the following ways the patient can remain in a safe environment:  be around others     PFSH:  Patient Active Problem List   Diagnosis    Hypokalemia    Hematuria    Urge Incontinence Of Urine    Chronic Constipation    Cervical Spine Stenosis    Generalized anxiety disorder    Patellofemoral Syndrome Of The Left Knee    Hypercholesteremia    Major Depression, Recurrent    Hypertension    Generalized Osteoarthritis    Cervical Disc Degeneration    Lumbar Radiculopathy    Bladder wall thickening    Hydronephrosis, right    Hypomagnesemia    Brachial neuritis or radiculitis NOS    Health care maintenance    Chronic pain    Osteoarthritis of knees, bilateral    Edema    Healthcare maintenance    Chronic patellofemoral pain of both knees    Class 3 severe obesity with serious comorbidity and body mass index (BMI) of 45.0 to 49.9 in adult, unspecified obesity type (H)    Impairment of balance    Prinzmetal's angina (H)    Bilateral lower extremity edema    Snoring    Wheezing    Spinal stenosis of lumbar region, unspecified whether neurogenic claudication present    Anemia, unspecified type    Prolonged QT interval syndrome    Abnormal electrocardiogram (ECG) (EKG)    Confusion    Chronic prescription opiate use    Hospital discharge follow-up    Hypothyroidism, unspecified type    Housing insecurity    Viral gastroenteritis     Current Outpatient Medications   Medication Sig Dispense Refill    albuterol (PROAIR HFA/PROVENTIL HFA/VENTOLIN HFA) 108 (90 Base) MCG/ACT  "inhaler Inhale 2 puffs into the lungs every 6 hours 18 g 0    apixaban ANTICOAGULANT (ELIQUIS ANTICOAGULANT) 5 MG tablet Take 1 tablet (5 mg) by mouth 2 times daily 180 tablet 3    aspirin-acetaminophen-caffeine (EXCEDRIN MIGRAINE) 250-250-65 MG tablet Take 1 tablet by mouth daily as needed for headaches      DULoxetine (CYMBALTA) 30 MG capsule Take 1 capsule (30 mg) by mouth 2 times daily. 180 capsule 3    ferrous sulfate (FEROSUL) 325 (65 Fe) MG tablet Take 1 tablet (325 mg) by mouth every other day 90 tablet 1    oxyCODONE-acetaminophen (PERCOCET)  MG per tablet Take 1 tablet by mouth every 4 hours as needed for severe pain      OXYCONTIN 10 MG 12 hr tablet TAKE ONE TABLET BY MOUTH TWICE A DAY AS DIRECTED      Pregabalin (LYRICA) 200 MG capsule        No current facility-administered medications for this visit.        History   Smoking Status    Former    Types: Cigarettes   Smokeless Tobacco    Never     Social History     Social History Narrative    Not on file     Patient Care Team:  Jaime Keane MD as PCP - General (Family Medicine)  Jaime Keane MD as Assigned PCP  Johnathan Kelly MD as MD (Cardiovascular Disease)  Jose Miguel Qureshi MD as MD (Cardiovascular Disease)  Johnathan Kelly MD as Assigned Heart and Vascular Provider  Emery Schwartz PA-C as Assigned Cancer Care Provider  Ernesto Chandler MD as MD (Neurology)        Objective  Physical Exam  Vitals:    12/10/24 1028   Pulse: 64   Resp: 18   Temp: 97  F (36.1  C)   TempSrc: Temporal   SpO2: 97%   Weight: 104.3 kg (230 lb)   Height: 1.59 m (5' 2.6\")     Body mass index is 41.27 kg/m .  Gen- alert, oriented/ appropriately responsive  HEENT- normal cephalic, atraumatic.   Chest- Normal inspiration and expiration.    Clear to ascultation.    No chest wall deformity or scar.  CV- Heart regular rate and rhythm  normal tones, no murmurs   No gallops or rubs.  Ext- appear well perfused, no edema  Skin- warm and dry,   no " visualized rash    Diagnostics:   Pending        Please note: Voice recognition software was used in this dictation.  It may therefore contain typographical errors.      Screening Questionnaire for Adult Immunization    Are you sick today?   No   Do you have allergies to medications, food, a vaccine component or latex?   No   Have you ever had a serious reaction after receiving a vaccination?   No   Do you have a long-term health problem with heart, lung, kidney, or metabolic disease (e.g., diabetes), asthma, a blood disorder, no spleen, complement component deficiency, a cochlear implant, or a spinal fluid leak?  Are you on long-term aspirin therapy?   No   Do you have cancer, leukemia, HIV/AIDS, or any other immune system problem?   No   Do you have a parent, brother, or sister with an immune system problem?   No   In the past 3 months, have you taken medications that affect  your immune system, such as prednisone, other steroids, or anticancer drugs; drugs for the treatment of rheumatoid arthritis, Crohn s disease, or psoriasis; or have you had radiation treatments?   No   Have you had a seizure, or a brain or other nervous system problem?   No   During the past year, have you received a transfusion of blood or blood    products, or been given immune (gamma) globulin or antiviral drug?   No   For women: Are you pregnant or is there a chance you could become       pregnant during the next month?   No   Have you received any vaccinations in the past 4 weeks?   No     Immunization questionnaire answers were all negative.      Patient instructed to remain in clinic for 15 minutes afterwards, and to report any adverse reactions.     Screening performed by Chantelle Banerjee MA on 12/10/2024 at 10:32 AM.       Answers submitted by the patient for this visit:  Patient Health Questionnaire (Submitted on 12/10/2024)  If you checked off any problems, how difficult have these problems made it for you to do your work, take  care of things at home, or get along with other people?: Very difficult  PHQ9 TOTAL SCORE: 8  DME (Durable Medical Equipment) Orders and Documentation  Orders Placed This Encounter   Procedures    Incontinence Supplies Order for DME - ONLY FOR DME     A total of 55minutes was spent on this visit reviewing previous notes, counseling patient, ordering tests , adjusting meds (see below) and documenting the findings in this note    The patient was assessed and it was determined the patient is in need of the following listed DME Supplies/Equipment. Please complete supporting documentation below to demonstrate medical necessity.

## 2024-12-10 NOTE — ASSESSMENT & PLAN NOTE
Indicates that she runs into problems because she insists on keeping her dog.  Agreeable to care coordination help.  Currently homeless living in a hotel/shelter

## 2024-12-10 NOTE — ASSESSMENT & PLAN NOTE
Hospitalized as outlined below  MED REC REQUIRED  Post Medication Reconciliation Status:  Unable to reconcile discharge medications

## 2024-12-10 NOTE — ASSESSMENT & PLAN NOTE
Previously evaluated by urology.  Really wants to pursue this further.  Will refer again,  Ordered urinary incontinence briefs.

## 2024-12-10 NOTE — ASSESSMENT & PLAN NOTE
Recent hemoglobins have been 9 and 10, will repeat iron studies today.  If low iron, colonoscopy, could hold DOAC at this point.  If normal iron, consider other diagnosis,?  Hematology?  Patient had negative Cologuard-early this year

## 2024-12-11 ENCOUNTER — PATIENT OUTREACH (OUTPATIENT)
Dept: CARE COORDINATION | Facility: CLINIC | Age: 59
End: 2024-12-11
Payer: COMMERCIAL

## 2024-12-11 NOTE — Clinical Note
Assessment with ELENI FRAGA on 12-18-24 at 9am. Homeless Patient noted desire to discuss housing- homeless, concern about service dog.

## 2024-12-11 NOTE — PROGRESS NOTES
12/11/2024  Clinic Care Coordination Contact  [facility]/Voicemail    Clinical Data: Care Coordinator Outreach    Outreach Documentation Number of Outreach Attempt   12/11/2024  10:15 AM 1     Referral from PCP:   Financial Support   Housing       Left message on patient's voicemail with call back information and requested return call.      Plan: Care Coordinator will try to reach patient again in 1-2 business days.    CHW Outreach: 2nd attempt 12-12-24    Denis Morris  Community Health Worker  [facility]  Clinic Care Coordination  [email or web address]   Office: [phone]  Fax: [phone]

## 2024-12-12 NOTE — PROGRESS NOTES
12/12/2024  Clinic Care Coordination Contact  Community Health Worker Initial Outreach    CHW Initial Information Gathering:  Referral Source: PCP  Preferred Hospital: [facility]  [phone]  Preferred Urgent Care: [facility], [phone]  Current living arrangement:: I live alone  Type of residence:: Homeless  Community Resources: County Programs, PCA, Home Care (has SNAP benefit, [place], has PCA)  Supplies Currently Used at Home: Oxygen Tubing/Supplies, Incontinence Supplies (need pull ups)  Equipment Currently Used at Home: cane, straight  Informal Support system:: None, Other (PCA)  No PCP office visit in Past Year: No  Transportation means:: Medical transport  CHW Additional Questions  If ED/Hospital discharge, follow-up appointment scheduled as recommended?: N/A  Medication changes made following ED/Hospital discharge?: N/A  MyChart active?: Yes  Patient sent Social Drivers of Health questionnaire?: No  Patient agreeable to assistance with activating MyChart?: No    Patient accepts CC: Yes. Patient scheduled for assessment with CC SW  on 12-18-24 at 9am. Patient noted desire to discuss housing- homeless, concerns about her service dog.     PCP referral:   Financial Support   Housing       Engaged in [employer]  Spoke with patient regarding referral from PCP.  CHW discussed possible Clinic Care Coordination enrollment.    The service was described to the patient and immediate needs were discussed.    The patient agreed to enroll in CCC.  Patient shared  -homeless  -she lives a lone with friend. She is homeless  -has service dog has concern about the service dog  -has SSDI benefit  -has SNAP benefit  -has PCA  Uses a cane, oxygen  Need pull ups diapers.    Routed to CC SW to review note.    Denis Morris  Community Health Worker  [facility]  Clinic Care Coordination  [email or web address]   Office: [phone]  Fax: [phone]

## 2024-12-17 NOTE — TELEPHONE ENCOUNTER
Patient Quality Outreach    Patient is due for the following:   Breast Cancer Screening - Mammogram    Action(s) Taken:   Patient was scheduled for a mammogram on 12/10/24.    Type of outreach:    Patient had mammogram on 12/10/24.    Questions for provider review:    None           Andrea Behrend

## 2024-12-17 NOTE — TELEPHONE ENCOUNTER
MEDICAL RECORDS REQUEST   [facility]  Urology Clinic  [address] [place] [address]  PHONE: [phone]  Fax: [phone]        FUTURE VISIT INFORMATION                                                   Sridevi Centeno, : 1965 scheduled for future visit at [facility]    APPOINTMENT INFORMATION:  Date: 2025  Provider:  Cindy Wharton MD  Reason for Visit/Diagnosis: Urge incontinence    REFERRAL INFORMATION:  Referring provider:  aJime Keane MD in [facility] FAMILY MEDICINE/OB      RECORDS REQUESTED FOR VISIT                                                     NOTES  STATUS/DETAILS   OFFICE NOTE from referring provider  yes, 12/10/2024 -- Jaiem Keane MD in [facility] FAMILY MEDICINE/OB   MEDICATION LIST  yes   LABS     URINALYSIS (UA)  yes     PRE-VISIT CHECKLIST      Joint diagnostic appointment coordinated correctly          (ensure right order & amount of time) Yes   RECORD COLLECTION COMPLETE Yes

## 2024-12-18 ENCOUNTER — PATIENT OUTREACH (OUTPATIENT)
Dept: FAMILY MEDICINE | Facility: CLINIC | Age: 59
End: 2024-12-18
Payer: COMMERCIAL

## 2024-12-18 NOTE — PROGRESS NOTES
Clinic Care Coordination Contact  [facility]/Voicemail    Clinical Data: Care Coordinator Outreach        Left message on patient's voicemail with call back information and requested return call.      Plan: Care Coordinator will send care coordination introduction letter with care coordinator contact information and explanation of care coordination services via [email or web address]. Care Coordinator will try to reach patient again in 1-2 business days.    Alena Gentile [facility]  Social [facility]   Phone: [phone]

## 2024-12-18 NOTE — LETTER
[facility] CARE COORDINATION  [address] [address]    December 19, 2024    Sridevi Centeno  [address]   [place] [address]      Dear Sridevi,    I have been attempting to reach you since our last contact. I would like to continue to work with you and provide any additional support you may need on achieving your health care related goals. I would appreciate if you would give me a call at [phone] or Aun at [phone]  to let me know if you would like to continue working together. I know that there are many things that can affect our ability to communicate and I hope we can continue to work together.    We are focused on providing you with the highest-quality healthcare experience possible.    Sincerely,    Alena Gentile, [facility]  Social Work Care Cooridinator   [facility]   Phone: [phone]

## 2024-12-19 NOTE — PROGRESS NOTES
Clinic Care Coordination Contact  [facility]/Voicemail    Clinical Data: Care Coordinator Outreach        Left message on patient's voicemail with call back information and requested return call.      Plan: Care Coordinator will send unable to contact letter with care coordinator contact information via [employer]. Care Coordinator will try to reach patient again in 1-2 business days.    Alena Gentile [facility]  Social Work Care [facility]   Phone: [phone]

## 2024-12-24 ENCOUNTER — PRE VISIT (OUTPATIENT)
Dept: PSYCHIATRY | Facility: CLINIC | Age: 59
End: 2024-12-24
Payer: COMMERCIAL

## 2024-12-24 NOTE — TELEPHONE ENCOUNTER
"PSYCHIATRY CLINIC PHONE INTAKE     SERVICES REQUESTED / INTERESTED IN          Med Management    Presenting Problem and Brief History                              What would you like to be seen for? (brief description):    Cymbalta currently for depression. First treated about 10 years ago.     PCP managing cymbalta. This is the only medication she's tried.    Have you received a mental health diagnosis? Yes   Which one (s): Depression  Is there any history of developmental delay?  No   Are you currently seeing a mental health provider?  No            Who / month last seen:  n  Do you have mental health records elsewhere?  Yes (long time ago)  Will you sign a release so we can obtain them?  Yes    Have you ever been hospitalized for psychiatric reasons?   Unsure   Describe:  said no, but also said that she had \"a weird breakdown\" last year and that her family called an ambulance, she was admitted for a couple of days    Do you have current thoughts of self-harm?  No  yes sometimes (message sent to social work [employer], with patient consent)  Do you currently have thoughts of harming others?  No    Do you have any safety concerns? No   If yes to these, offer to reach out to a  for follow up.      Substance Use History     Do you have any history of alcohol or other substance use?  No  Describe:  na  Have you ever received treatment for this?  No    Describe:  na     Social History     Who is the patient's a guardian?   self     Name / number: self  Have you had an ACT team in last 12 months?  No  Describe: n   OK to leave a detailed voicemail?  Yes        Medical/ Surgical History                                   Patient Active Problem List   Diagnosis    Hypokalemia    Hematuria    Urge Incontinence Of Urine    Chronic Constipation    Cervical Spine Stenosis    Generalized anxiety disorder    Patellofemoral Syndrome Of The Left Knee    Hypercholesteremia    Major Depression, Recurrent    Hypertension    " Generalized Osteoarthritis    Cervical Disc Degeneration    Lumbar Radiculopathy    Bladder wall thickening    Hydronephrosis, right    Hypomagnesemia    Brachial neuritis or radiculitis NOS    Health care maintenance    Chronic pain    Osteoarthritis of knees, bilateral    Edema    Healthcare maintenance    Chronic patellofemoral pain of both knees    Class 3 severe obesity with serious comorbidity and body mass index (BMI) of 45.0 to 49.9 in adult, unspecified obesity type (H)    Impairment of balance    Prinzmetal's angina (H)    Bilateral lower extremity edema    Snoring    Wheezing    Spinal stenosis of lumbar region, unspecified whether neurogenic claudication present    Anemia, unspecified type    Prolonged QT interval syndrome    Abnormal electrocardiogram (ECG) (EKG)    Confusion    Chronic prescription opiate use    Hospital discharge follow-up    Hypothyroidism, unspecified type    Housing insecurity    Viral gastroenteritis          Medications             Have you taken >3 psychiatric medications in your past?  n  Do you currently take 5 or more medications, including prescriptions, supplements, and other over the counter products?  n    If YES to at least one of these questions:   As part of your evaluation in our clinic, we have specially trained pharmacists as part of your care team. Your provider would like for you to meet with one of our pharmacists to review your current and past medications, ensure your med list is up to date, and queue up any questions or concerns you have about medications. They will review all of your medications, not just for mental health, to help ensure you know what you re taking and that everything is working together.     Please schedule patient in [facility] PSYCHIATRY (Valerie Alatorre or Winter Newman) for 60m MTM in any green space as virtual (video), telephone, or in person (designated in person days per Epic templates).  -Appt notes can say  Psych eval on  xx/xx   -Route telephone encounter to the pharmacist who will be seeing the patient.  If patient has questions about insurance coverage or billing, please still schedule the visit and refer them to call the [facility] coordinators at [phone].    Current Outpatient Medications   Medication Sig Dispense Refill    albuterol (PROAIR HFA/PROVENTIL HFA/VENTOLIN HFA) 108 (90 Base) MCG/ACT inhaler Inhale 2 puffs into the lungs every 6 hours 18 g 0    apixaban ANTICOAGULANT (ELIQUIS ANTICOAGULANT) 5 MG tablet Take 1 tablet (5 mg) by mouth 2 times daily 180 tablet 3    aspirin-acetaminophen-caffeine (EXCEDRIN MIGRAINE) 250-250-65 MG tablet Take 1 tablet by mouth daily as needed for headaches      DULoxetine (CYMBALTA) 30 MG capsule Take 1 capsule (30 mg) by mouth 2 times daily. 180 capsule 3    ferrous sulfate (FEROSUL) 325 (65 Fe) MG tablet Take 1 tablet (325 mg) by mouth every other day 90 tablet 1    oxyCODONE-acetaminophen (PERCOCET)  MG per tablet Take 1 tablet by mouth every 4 hours as needed for severe pain      OXYCONTIN 10 MG 12 hr tablet TAKE ONE TABLET BY MOUTH TWICE A DAY AS DIRECTED      Pregabalin (LYRICA) 200 MG capsule            DISPOSITION      Phone screen completed with patient. Scheduled for AGE in resident clinic 1/3/25. Emailed new patient packet    Complex  Doris yuen

## 2024-12-26 ENCOUNTER — PATIENT OUTREACH (OUTPATIENT)
Dept: CARE COORDINATION | Facility: CLINIC | Age: 59
End: 2024-12-26
Payer: COMMERCIAL

## 2024-12-26 NOTE — PROGRESS NOTES
Clinic Care Coordination Contact  [facility]/Voicemail    Clinical Data: Care Coordinator Outreach    Outreach Documentation Number of Outreach Attempt   12/26/2024  12:52 PM 1       Left message on patient's voicemail with call back information and requested return call.      Plan: Care Coordinator will try to reach patient again in 1-2 business days.    Kaci Burrell CATIA  Clinic Care Coordination  [facility]  [email or web address]  [phone]

## 2024-12-30 ENCOUNTER — PATIENT OUTREACH (OUTPATIENT)
Dept: CARE COORDINATION | Facility: CLINIC | Age: 59
End: 2024-12-30

## 2024-12-30 NOTE — Clinical Note
TALYA Chambers pt was UTC for scheduled CCC assessment today. Will try to reach her again tomorrow.

## 2024-12-30 NOTE — Clinical Note
FYI - [facility] pt x2. Pt has my phone number via my chart and phone - will reopen if they reach out to me. [facility] program closed at this time. Feel free to schedule with me again if pt reaches out to you or if a new referral is placed by PCP.

## 2024-12-30 NOTE — LETTER
[facility] CARE COORDINATION  [address] [address]   December 30, 2024    Sridevi Centeno  [address]   [place] [address]      Dear Sridevi,    I am a clinic care coordinator who works with Jaime Keane MD with the [facility]. I recently tried to call and was unable to reach you. Below is a description of clinic care coordination and how I can further assist you.       The clinic care coordination team is made up of a registered nurse, , financial resource worker and community health worker who understand the health care system. The goal of clinic care coordination is to help you manage your health and improve access to the health care system. Our team works alongside your provider to assist you in determining your health and social needs. We can help you obtain health care and community resources, providing you with necessary information and education. We can work with you through any barriers and develop a care plan that helps coordinate and strengthen the communication between you and your care team.  Our services are voluntary and are offered without charge to you personally.    Please feel free to contact me with any questions or concerns regarding care coordination and what we can offer.      We are focused on providing you with the highest-quality healthcare experience possible.    Sincerely,     Miriam Stanton, [facility] Clinical Care Coordinator  [facility], [place], [place], [place], [facility], and [facility]   [phone]

## 2024-12-30 NOTE — PROGRESS NOTES
Clinic Care Coordination Contact  [facility]/Voicemail    Clinical Data: Care Coordinator Outreach    Outreach Documentation Number of Outreach Attempt   12/26/2024  12:52 PM 1   12/26/2024   1:49 PM 1   12/30/2024   7:34 AM 1   12/31/2024  11:48 AM 2       Left message on patient's voicemail with call back information and requested return call.      Plan: Care Coordinator sent care coordination introduction letter on 12/30/24 via [employer]. Care Coordinator will do no further outreach at this time. Pt has this [facility]'s phone number via my chart and phone. If pt reaches out support will be provided.

## 2025-01-09 ENCOUNTER — OFFICE VISIT (OUTPATIENT)
Dept: FAMILY MEDICINE | Facility: CLINIC | Age: 60
End: 2025-01-09
Payer: COMMERCIAL

## 2025-01-09 VITALS
HEIGHT: 62 IN | DIASTOLIC BLOOD PRESSURE: 76 MMHG | OXYGEN SATURATION: 98 % | BODY MASS INDEX: 43.43 KG/M2 | SYSTOLIC BLOOD PRESSURE: 114 MMHG | TEMPERATURE: 97.1 F | HEART RATE: 80 BPM | RESPIRATION RATE: 21 BRPM | WEIGHT: 236 LBS

## 2025-01-09 DIAGNOSIS — E66.813 CLASS 3 SEVERE OBESITY WITH SERIOUS COMORBIDITY AND BODY MASS INDEX (BMI) OF 45.0 TO 49.9 IN ADULT, UNSPECIFIED OBESITY TYPE (H): ICD-10-CM

## 2025-01-09 DIAGNOSIS — S92.412D CLOSED DISPLACED FRACTURE OF PROXIMAL PHALANX OF LEFT GREAT TOE WITH ROUTINE HEALING, SUBSEQUENT ENCOUNTER: ICD-10-CM

## 2025-01-09 DIAGNOSIS — Z23 NEED FOR SHINGLES VACCINE: ICD-10-CM

## 2025-01-09 DIAGNOSIS — D64.9 ANEMIA, UNSPECIFIED TYPE: ICD-10-CM

## 2025-01-09 DIAGNOSIS — I10 ESSENTIAL HYPERTENSION: Primary | ICD-10-CM

## 2025-01-09 DIAGNOSIS — E87.6 HYPOKALEMIA: ICD-10-CM

## 2025-01-09 DIAGNOSIS — E66.01 CLASS 3 SEVERE OBESITY WITH SERIOUS COMORBIDITY AND BODY MASS INDEX (BMI) OF 45.0 TO 49.9 IN ADULT, UNSPECIFIED OBESITY TYPE (H): ICD-10-CM

## 2025-01-09 LAB
ANION GAP SERPL CALCULATED.3IONS-SCNC: 9 MMOL/L (ref 7–15)
BUN SERPL-MCNC: 18.3 MG/DL (ref 8–23)
CALCIUM SERPL-MCNC: 9.5 MG/DL (ref 8.8–10.4)
CHLORIDE SERPL-SCNC: 102 MMOL/L (ref 98–107)
CREAT SERPL-MCNC: 0.78 MG/DL (ref 0.51–0.95)
EGFRCR SERPLBLD CKD-EPI 2021: 86 ML/MIN/1.73M2
ERYTHROCYTE [DISTWIDTH] IN BLOOD BY AUTOMATED COUNT: 14.1 % (ref 10–15)
GLUCOSE SERPL-MCNC: 96 MG/DL (ref 70–99)
HCO3 SERPL-SCNC: 30 MMOL/L (ref 22–29)
HCT VFR BLD AUTO: 33.7 % (ref 35–47)
HGB BLD-MCNC: 10.4 G/DL (ref 11.7–15.7)
MCH RBC QN AUTO: 27.5 PG (ref 26.5–33)
MCHC RBC AUTO-ENTMCNC: 30.9 G/DL (ref 31.5–36.5)
MCV RBC AUTO: 89 FL (ref 78–100)
PLATELET # BLD AUTO: 218 10E3/UL (ref 150–450)
POTASSIUM SERPL-SCNC: 3.9 MMOL/L (ref 3.4–5.3)
RBC # BLD AUTO: 3.78 10E6/UL (ref 3.8–5.2)
SODIUM SERPL-SCNC: 141 MMOL/L (ref 135–145)
WBC # BLD AUTO: 4.6 10E3/UL (ref 4–11)

## 2025-01-09 ASSESSMENT — PATIENT HEALTH QUESTIONNAIRE - PHQ9
SUM OF ALL RESPONSES TO PHQ QUESTIONS 1-9: 4
SUM OF ALL RESPONSES TO PHQ QUESTIONS 1-9: 4
10. IF YOU CHECKED OFF ANY PROBLEMS, HOW DIFFICULT HAVE THESE PROBLEMS MADE IT FOR YOU TO DO YOUR WORK, TAKE CARE OF THINGS AT HOME, OR GET ALONG WITH OTHER PEOPLE: NOT DIFFICULT AT ALL

## 2025-01-09 NOTE — PROGRESS NOTES
Assessment/ Plan  Anemia, unspecified type  Still slightly low but stable hemoglobin, last iron study normal.  Continue to follow.    Class 3 severe obesity with serious comorbidity and body mass index (BMI) of 45.0 to 49.9 in adult, unspecified obesity type (H)  Trying to eat better, lose weight.  [place] 1200 kcal ADA and lactose-free diet today.  Referral to bariatrics.    Closed displaced fracture of proximal phalanx of left great toe with routine healing, subsequent encounter  Ongoing pain, walking without protection, x-ray does not show signs of healing, recommend she resume wearing surgical shoe for protection, total 6 weeks after injury    Hypertension  Excellent blood pressure today, not currently taking medications    Hypokalemia  Quite profound in ER recently, potassium 2.912/19.  BMP rechecked and pending today.   Subjective  CC:  chief complaint  HPI:  Patient here for follow-up from recent ER visits.  Main issue is foot injury.  Avulsion fracture of left great toe distal phalanx.  Treated with firm soled surgical shoe.  Had been wearing that for about 3 weeks then stopped, continues to have pain with walking.  Also had overdose on her chronic pain medication.  Responded quickly to Narcan.    Generally doing a little bit better than before.  PFSH:  Patient Active Problem List   Diagnosis    Hypokalemia    Hematuria    Urge Incontinence Of Urine    Chronic Constipation    Cervical Spine Stenosis    Generalized anxiety disorder    Patellofemoral Syndrome Of The Left Knee    Hypercholesteremia    Major Depression, Recurrent    Hypertension    Generalized Osteoarthritis    Cervical Disc Degeneration    Lumbar Radiculopathy    Bladder wall thickening    Hydronephrosis, right    Hypomagnesemia    Brachial neuritis or radiculitis NOS    Health care maintenance    Chronic pain    Osteoarthritis of knees, bilateral    Edema    Healthcare maintenance    Chronic patellofemoral pain of  both knees    Class 3 severe obesity with serious comorbidity and body mass index (BMI) of 45.0 to 49.9 in adult, unspecified obesity type (H)    Impairment of balance    Prinzmetal's angina    Bilateral lower extremity edema    Snoring    Wheezing    Spinal stenosis of lumbar region, unspecified whether neurogenic claudication present    Anemia, unspecified type    Prolonged QT interval syndrome    Abnormal electrocardiogram (ECG) (EKG)    Confusion    Chronic prescription opiate use    Hospital discharge follow-up    Hypothyroidism, unspecified type    Housing insecurity    Viral gastroenteritis    Need for shingles vaccine    Closed displaced fracture of proximal phalanx of left great toe with routine healing, subsequent encounter     Current Outpatient Medications   Medication Sig Dispense Refill    albuterol (PROAIR HFA/PROVENTIL HFA/VENTOLIN HFA) 108 (90 Base) MCG/ACT inhaler Inhale 2 puffs into the lungs every 6 hours 18 g 0    apixaban ANTICOAGULANT (ELIQUIS ANTICOAGULANT) 5 MG tablet Take 1 tablet (5 mg) by mouth 2 times daily 180 tablet 3    aspirin-acetaminophen-caffeine (EXCEDRIN MIGRAINE) 250-250-65 MG tablet Take 1 tablet by mouth daily as needed for headaches      DULoxetine (CYMBALTA) 30 MG capsule Take 1 capsule (30 mg) by mouth 2 times daily. 180 capsule 3    ferrous sulfate (FEROSUL) 325 (65 Fe) MG tablet Take 1 tablet (325 mg) by mouth every other day 90 tablet 1    oxyCODONE-acetaminophen (PERCOCET)  MG per tablet Take 1 tablet by mouth every 4 hours as needed for severe pain      OXYCONTIN 10 MG 12 hr tablet TAKE ONE TABLET BY MOUTH TWICE A DAY AS DIRECTED      Pregabalin (LYRICA) 200 MG capsule        No current facility-administered medications for this visit.        History   Smoking Status    Former    Types: Cigarettes   Smokeless Tobacco    Never     Social History     Social History Narrative    Not on file     Patient Care Team:  Jaime Keane MD as PCP - General (Family  "Medicine)  Jaime Keane MD as Assigned PCP  Johnathan Kelly MD as MD (Cardiovascular Disease)  Jose Miguel Qureshi MD as MD (Cardiovascular Disease)  Johnathan Kelly MD as Assigned Heart and Vascular Provider  Emery Schwartz PA-C as Assigned Cancer Care Provider  Ernesto Chandler MD as MD (Neurology)  Cindy Wharton MD as MD (Urology)        Objective  Physical Exam  Vitals:    01/09/25 1357   BP: 114/76   BP Location: Right arm   Patient Position: Sitting   Cuff Size: Adult Large   Pulse: 80   Resp: 21   Temp: 97.1  F (36.2  C)   TempSrc: Oral   SpO2: 98%   Weight: 107 kg (236 lb)   Height: 1.58 m (5' 2.21\")     Body mass index is 42.88 kg/m .  Gen- alert, oriented/ appropriately responsive  HEENT- normal cephalic, atraumatic.   Chest- Normal inspiration and expiration.    Clear to ascultation.    No chest wall deformity or scar.  CV- Heart regular rate and rhythm  normal tones, no murmurs   No gallops or rubs.  Ext- appear well perfused, no edema  Tenderness over IP joint on great toe, laterally  Skin- warm and dry,   no visualized rash    Diagnostics:   Personally reviewed x-ray which shows avulsion fracture, no sign of callus formation at this point.      Please note: Voice recognition software was used in this dictation.  It may therefore contain typographical errors.        Screening Questionnaire for Adult Immunization    Are you sick today?   No   Do you have allergies to medications, food, a vaccine component or latex?   Yes   Have you ever had a serious reaction after receiving a vaccination?   No   Do you have a long-term health problem with heart, lung, kidney, or metabolic disease (e.g., diabetes), asthma, a blood disorder, no spleen, complement component deficiency, a cochlear implant, or a spinal fluid leak?  Are you on long-term aspirin therapy?   No   Do you have cancer, leukemia, HIV/AIDS, or any other immune system problem?   No   Do you have a parent, brother, or " sister with an immune system problem?   No   In the past 3 months, have you taken medications that affect  your immune system, such as prednisone, other steroids, or anticancer drugs; drugs for the treatment of rheumatoid arthritis, Crohn s disease, or psoriasis; or have you had radiation treatments?   No   Have you had a seizure, or a brain or other nervous system problem?   No   During the past year, have you received a transfusion of blood or blood    products, or been given immune (gamma) globulin or antiviral drug?   No   For women: Are you pregnant or is there a chance you could become       pregnant during the next month?   No   Have you received any vaccinations in the past 4 weeks?   Yes     Immunization questionnaire was positive for at least one answer.  Notified provider.      Patient instructed to remain in clinic for 15 minutes afterwards, and to report any adverse reactions.     Screening performed by Som Rooney MA on 1/9/2025 at 2:02 PM.        Answers submitted by the patient for this visit:  Patient Health Questionnaire (Submitted on 1/9/2025)  If you checked off any problems, how difficult have these problems made it for you to do your work, take care of things at home, or get along with other people?: Not difficult at all  PHQ9 TOTAL SCORE: 4

## 2025-01-09 NOTE — ASSESSMENT & PLAN NOTE
Ongoing pain, walking without protection, x-ray does not show signs of healing, recommend she resume wearing surgical shoe for protection, total 6 weeks after injury

## 2025-01-09 NOTE — ASSESSMENT & PLAN NOTE
Trying to eat better, lose weight.  [place] 1200 kcal ADA and lactose-free diet today.  Referral to bariatrics.

## 2025-01-13 ENCOUNTER — TELEPHONE (OUTPATIENT)
Dept: FAMILY MEDICINE | Facility: CLINIC | Age: 60
End: 2025-01-13
Payer: COMMERCIAL

## 2025-01-13 NOTE — TELEPHONE ENCOUNTER
RN attempted to contact patient, but no answer. Left message on patient's voice mail to call clinic back.    If patient calls back, please relay message below and help schedule an appointment . Thanks    Cristina Gordillo RN  [facility]    ----- Message from Jaime Keane sent at 1/13/2025  9:30 AM CST -----  Please contact her  Per the radiologist there is some question as whether her foot issue is really a fracture or a crystal deposition that she is reacting to (pseudogout)  We should get her an appt in about 10 days to sort this out- with another xray

## 2025-01-13 NOTE — TELEPHONE ENCOUNTER
PT called back.   Reviewed this message with pt.  She made the follow up appt on 1/23/25 with Dr. Keane.  CINTIA Red

## 2025-01-28 ENCOUNTER — TELEPHONE (OUTPATIENT)
Dept: ENDOCRINOLOGY | Facility: CLINIC | Age: 60
End: 2025-01-28
Payer: COMMERCIAL

## 2025-01-28 NOTE — TELEPHONE ENCOUNTER
RESCHEDULE Sequential appointments    Left Voicemail (1st Attempt) and Sent Mychart (1st Attempt) for the patient to call back and schedule the following:    Appointment type: New Weight Management  Appointment mode: In Person or Virtual Visit  Provider: Leanne Saldana NP, Kacie Clements PA-C, Kay Carlson PA-C, or Galilea Colin PA-C  Return date: Approx. Next available  Specialty phone number: [phone]    Additional Notes:   RESCHEDULE Sequential appointments on 5/7/25 with Les and 5/8/25 with Lexa      Appointment type: New Med WT MGMT Nutrition  Appointment mode: Virtual Visit  Provider: Kate Lindquist or Alessandra Freedman  Return date: Approx. 5/8/2025  Specialty phone number: [phone]    Additonal Notes: Patient may also be scheduled with Taryn Romero, Thomas Vincent, or Natalya Altamirano in  [place]urgical [place]

## 2025-01-30 ENCOUNTER — TELEPHONE (OUTPATIENT)
Dept: ENDOCRINOLOGY | Facility: CLINIC | Age: 60
End: 2025-01-30
Payer: COMMERCIAL

## 2025-01-30 NOTE — TELEPHONE ENCOUNTER
RESCHEDULE Sequential appointments     Left Voicemail (2nd Attempt) and Sent Mychart (2nd Attempt), Sent Paper letter  for the patient to call back and schedule the following:      Appointment type: New Weight Management  Appointment mode: In Person or Virtual Visit  Provider: Leanne Saldana NP, Kacie Clements PA-C, Kay Carlson PA-C, or Galilea Colin PA-C  Return date: Approx. Next available  Specialty phone number: [phone]     Additional Notes:   RESCHEDULE Sequential appointments on 5/7/25 with Les and 5/8/25 with Lexa        Appointment type: New Med WT MGMT Nutrition  Appointment mode: Virtual Visit  Provider: Kate Lindquist or Alessandra Freedman  Return date: Approx. 5/8/2025  Specialty phone number: [phone]     Additonal Notes: Patient may also be scheduled with Taryn Romero, Thomas Vincent, or Natalya Altamirano in  Surgical Weight Loss ([facility] [place]

## 2025-02-12 ENCOUNTER — PRE VISIT (OUTPATIENT)
Dept: UROLOGY | Facility: CLINIC | Age: 60
End: 2025-02-12
Payer: COMMERCIAL

## 2025-02-12 NOTE — TELEPHONE ENCOUNTER
Reason for visit: Urge incontinence      Relevant information: Referred by family med Dr. Keane. H/o HTN, hematuria, bladder wall thickening, hydronephrosis    Records/imaging/labs/orders: All records available    Pt called: no need for a call    At Rooming: Have patient empty their bladder and PVR. Get a urine sample and dip the urine if they have UTI symptoms.    Shirley Vazquez  2/12/2025  3:14 PM

## 2025-02-13 ENCOUNTER — OFFICE VISIT (OUTPATIENT)
Dept: FAMILY MEDICINE | Facility: CLINIC | Age: 60
End: 2025-02-13
Payer: COMMERCIAL

## 2025-02-13 ENCOUNTER — PRE VISIT (OUTPATIENT)
Dept: UROLOGY | Facility: CLINIC | Age: 60
End: 2025-02-13

## 2025-02-13 VITALS
HEART RATE: 60 BPM | TEMPERATURE: 97.5 F | RESPIRATION RATE: 16 BRPM | OXYGEN SATURATION: 100 % | DIASTOLIC BLOOD PRESSURE: 84 MMHG | WEIGHT: 235 LBS | SYSTOLIC BLOOD PRESSURE: 136 MMHG | BODY MASS INDEX: 43.24 KG/M2 | HEIGHT: 62 IN

## 2025-02-13 DIAGNOSIS — M79.675 PAIN OF TOE OF LEFT FOOT: ICD-10-CM

## 2025-02-13 DIAGNOSIS — K59.00 ACUTE CONSTIPATION: ICD-10-CM

## 2025-02-13 DIAGNOSIS — S92.412D CLOSED DISPLACED FRACTURE OF PROXIMAL PHALANX OF LEFT GREAT TOE WITH ROUTINE HEALING, SUBSEQUENT ENCOUNTER: Primary | ICD-10-CM

## 2025-02-13 DIAGNOSIS — M21.612 BUNION, LEFT: ICD-10-CM

## 2025-02-13 RX ORDER — BISACODYL 10 MG
10 SUPPOSITORY, RECTAL RECTAL DAILY PRN
Qty: 4 SUPPOSITORY | Refills: 3 | Status: SHIPPED | OUTPATIENT
Start: 2025-02-13

## 2025-02-13 RX ORDER — POLYETHYLENE GLYCOL 3350 17 G/17G
1 POWDER, FOR SOLUTION ORAL DAILY
Qty: 510 G | Refills: 3 | Status: SHIPPED | OUTPATIENT
Start: 2025-02-13

## 2025-02-13 NOTE — ASSESSMENT & PLAN NOTE
Second toe of left foot swollen, hammertoe deformity, not red, not warm, painful.  Not sure what this represents, x-ray appears normal, referral to podiatry

## 2025-02-13 NOTE — PROGRESS NOTES
Assessment/ Plan  Closed displaced fracture of proximal phalanx of left great toe with routine healing, subsequent encounter  See previous notes, possible fracture.  I do not see any signs of healing currently or any signs of obvious fracture or fracture fragment.  Suspect this is a hydroxyapatite deposition described 4.  Patient is mainly mild to moderately tender over medial first MP joint.  No redness or warmth or swelling.  She has a significant bunion deformity, will ask her to see podiatry.    Pain of toe of left foot  Second toe of left foot swollen, hammertoe deformity, not red, not warm, painful.  Not sure what this represents, x-ray appears normal, referral to podiatry    Acute constipation  Following bout of acute diarrhea, suspect norovirus infection.  MiraLAX, Dulcolax suppository as needed.  Follow-up if not improving   Subjective  CC:  chief complaint  HPI:  59-year-old, seen here in follow-up from foot injury 1/9/2025.  Initially seen 12/16/2024 in [facility] emergency room.  Unclear mechanism of injury but had accidental opioid overdose, seen in the emergency room for this, then return later in the day with left foot pain.  X-ray suggested fracture.  Treated with postop shoe and asked to follow-up with primary care.  I saw her 1/9/2025.  Samy-rayed the foot, this point at 3+ weeks after the injury.  She had not been wearing any protection and had significant pain in the joint.  Noted that it was crooked.  X-ray was done which showed hallux valgus deformity and question of avulsion fracture in medial MP joint versus hydroxyapatite deposition.  Recommend follow-up in the short-term, another x-ray.  She is back today, still has some pain.  It is somewhat better.  Chief area of pain is her second toe rather than her first toe.  She is alarmed about the quick in nature of her bunion deformity.  PFSH:  Patient Active Problem List   Diagnosis    Hypokalemia    Hematuria    Urge Incontinence Of Urine    Acute  constipation    Cervical Spine Stenosis    Generalized anxiety disorder    Patellofemoral Syndrome Of The Left Knee    Hypercholesteremia    Major Depression, Recurrent    Hypertension    Generalized Osteoarthritis    Cervical Disc Degeneration    Lumbar Radiculopathy    Bladder wall thickening    Hydronephrosis, right    Hypomagnesemia    Brachial neuritis or radiculitis NOS    Health care maintenance    Chronic pain    Osteoarthritis of knees, bilateral    Edema    Healthcare maintenance    Chronic patellofemoral pain of both knees    Class 3 severe obesity with serious comorbidity and body mass index (BMI) of 45.0 to 49.9 in adult, unspecified obesity type (H)    Impairment of balance    Prinzmetal's angina    Bilateral lower extremity edema    Snoring    Wheezing    Spinal stenosis of lumbar region, unspecified whether neurogenic claudication present    Anemia, unspecified type    Prolonged QT interval syndrome    Abnormal electrocardiogram (ECG) (EKG)    Confusion    Chronic prescription opiate use    Hospital discharge follow-up    Hypothyroidism, unspecified type    Housing insecurity    Viral gastroenteritis    Need for shingles vaccine    Closed displaced fracture of proximal phalanx of left great toe with routine healing, subsequent encounter    Pain of toe of left foot    Bunion, left     Current Outpatient Medications   Medication Sig Dispense Refill    albuterol (PROAIR HFA/PROVENTIL HFA/VENTOLIN HFA) 108 (90 Base) MCG/ACT inhaler Inhale 2 puffs into the lungs every 6 hours 18 g 2    apixaban ANTICOAGULANT (ELIQUIS ANTICOAGULANT) 5 MG tablet Take 1 tablet (5 mg) by mouth 2 times daily 180 tablet 3    aspirin-acetaminophen-caffeine (EXCEDRIN MIGRAINE) 250-250-65 MG tablet Take 1 tablet by mouth daily as needed for headaches      bisacodyl (DULCOLAX) 10 MG suppository Place 1 suppository (10 mg) rectally daily as needed for constipation. 4 suppository 3    DULoxetine (CYMBALTA) 30 MG capsule Take 1  "capsule (30 mg) by mouth 2 times daily. 180 capsule 3    ferrous sulfate (FEROSUL) 325 (65 Fe) MG tablet Take 1 tablet (325 mg) by mouth every other day 90 tablet 1    oxyCODONE-acetaminophen (PERCOCET)  MG per tablet Take 1 tablet by mouth every 4 hours as needed for severe pain      polyethylene glycol (MIRALAX) 17 GM/Dose powder Take 17 g (1 Capful) by mouth daily. 510 g 3    Pregabalin (LYRICA) 200 MG capsule        No current facility-administered medications for this visit.        History   Smoking Status    Former    Types: Cigarettes   Smokeless Tobacco    Never     Social History     Social History Narrative    Not on file     Patient Care Team:  Jaime Keane MD as PCP - General (Family Medicine)  Jaime Keane MD as Assigned PCP  Johnathan Kelly MD as MD (Cardiovascular Disease)  Jose Miguel Qureshi MD as MD (Cardiovascular Disease)  Johnathan Kelly MD as Assigned Heart and Vascular Provider  Emery Schwartz PA-C as Assigned Cancer Care Provider  Ernesto Chandler MD as MD (Neurology)  Cindy Wharton MD as MD (Urology)        Objective  Physical Exam  Vitals:    02/13/25 1534   BP: 136/84   BP Location: Right arm   Patient Position: Sitting   Cuff Size: Adult Large   Pulse: 60   Resp: 16   Temp: 97.5  F (36.4  C)   TempSrc: Temporal   SpO2: 100%   Weight: 106.6 kg (235 lb)   Height: 1.58 m (5' 2.21\")     Body mass index is 42.7 kg/m .  Exam shows no acute warmth or redness in the foot.  She does have swelling of the second toe.  She has typical appearing bunion deformity of the first MP joint.  There is mild to moderate tenderness over the medial portion of this joint but no bruising swelling or redness      Diagnostics:   Personally reviewed x-ray which is as discussed above      Please note: Voice recognition software was used in this dictation.  It may therefore contain typographical errors.      Answers submitted by the patient for this visit:  General Questionnaire " (Submitted on 2/13/2025)  Chief Complaint: Chronic problems general questions HPI Form  What is the reason for your visit today? : my left foot  How many servings of fruits and vegetables do you eat daily?: 2-3  On average, how many sweetened beverages do you drink each day (Examples: soda, juice, sweet tea, etc.  Do NOT count diet or artificially sweetened beverages)?: 2  How many minutes a day do you exercise enough to make your heart beat faster?: 60 or more  How many days a week do you exercise enough to make your heart beat faster?: 7  How many days per week do you miss taking your medication?: 0  Questionnaire about: Chronic problems general questions HPI Form (Submitted on 2/13/2025)  Chief Complaint: Chronic problems general questions HPI Form

## 2025-02-13 NOTE — ASSESSMENT & PLAN NOTE
See previous notes, possible fracture.  I do not see any signs of healing currently or any signs of obvious fracture or fracture fragment.  Suspect this is a hydroxyapatite deposition described 4.  Patient is mainly mild to moderately tender over medial first MP joint.  No redness or warmth or swelling.  She has a significant bunion deformity, will ask her to see podiatry.

## 2025-02-13 NOTE — PROGRESS NOTES
Prior to immunization administration, verified patients identity using patient s name and date of birth. Please see Immunization Activity for additional information.     Screening Questionnaire for Adult Immunization    Are you sick today?   Yes   Do you have allergies to medications, food, a vaccine component or latex?   No   Have you ever had a serious reaction after receiving a vaccination?   No   Do you have a long-term health problem with heart, lung, kidney, or metabolic disease (e.g., diabetes), asthma, a blood disorder, no spleen, complement component deficiency, a cochlear implant, or a spinal fluid leak?  Are you on long-term aspirin therapy?   No   Do you have cancer, leukemia, HIV/AIDS, or any other immune system problem?   No   Do you have a parent, brother, or sister with an immune system problem?   No   In the past 3 months, have you taken medications that affect  your immune system, such as prednisone, other steroids, or anticancer drugs; drugs for the treatment of rheumatoid arthritis, Crohn s disease, or psoriasis; or have you had radiation treatments?   No   Have you had a seizure, or a brain or other nervous system problem?   No   During the past year, have you received a transfusion of blood or blood    products, or been given immune (gamma) globulin or antiviral drug?   No   For women: Are you pregnant or is there a chance you could become       pregnant during the next month?   No   Have you received any vaccinations in the past 4 weeks?   No     Immunization questionnaire was positive for at least one answer.  Notified provider.      Patient instructed to remain in clinic for 15 minutes afterwards, and to report any adverse reactions.     Screening performed by Vicente Chen MA on 2/13/2025 at 3:37 PM.

## 2025-02-13 NOTE — ASSESSMENT & PLAN NOTE
Following bout of acute diarrhea, suspect norovirus infection.  MiraLAX, Dulcolax suppository as needed.  Follow-up if not improving

## 2025-02-13 NOTE — PROGRESS NOTES
"  {PROVIDER CHARTING PREFERENCE:002226}    Baltazar Laureano is a 59 year old, presenting for the following health issues:  office visit (Left foot follow up and xray. Diarrhea last week and now she is constipating)        2/13/2025     3:33 PM   Additional Questions   Roomed by    Accompanied by self     History of Present Illness       Reason for visit:  My left foot    She eats 2-3 servings of fruits and vegetables daily.She consumes 2 sweetened beverage(s) daily.She exercises with enough effort to increase her heart rate 60 or more minutes per day.  She exercises with enough effort to increase her heart rate 7 days per week.   She is taking medications regularly.       {MA/LPN/RN Pre-Provider Visit Orders- hCG/UA/Strep (Optional):326661}  {SUPERLIST (Optional):082727}  {additonal problems for provider to add (Optional):044096}    {ROS Picklists (Optional):464392}      Objective    /84 (BP Location: Right arm, Patient Position: Sitting, Cuff Size: Adult Large)   Pulse 60   Temp 97.5  F (36.4  C) (Temporal)   Resp 16   Ht 1.58 m (5' 2.21\")   Wt 106.6 kg (235 lb)   LMP  (LMP Unknown)   SpO2 100%   BMI 42.70 kg/m    Body mass index is 42.7 kg/m .  Physical Exam   {Exam List (Optional):173345}    {Diagnostic Test Results (Optional):506118}        Signed Electronically by: Jiame Keane MD  {Email feedback regarding this note to [email or web address]   :177892}  "

## 2025-03-13 ENCOUNTER — TELEPHONE (OUTPATIENT)
Dept: FAMILY MEDICINE | Facility: CLINIC | Age: 60
End: 2025-03-13

## 2025-03-13 NOTE — TELEPHONE ENCOUNTER
Renée Palomo calling from [facility] stating she received information from patient's insurance stating she was seen at the ER for an Opiate overdose. [facility] is requesting for that ED visit to be faxed to them. She stated she faxed in an KEITH to [facility] today.     Care team: please fax ED visit on 12/16/24 for opiate overdose to Renée Palomo from [facility] once KEITH is received.       Renée palomo   Phone: [phone]   Fax: [phone]        MONIQUE Saavedra CemN RN  [facility]

## 2025-03-25 ENCOUNTER — OFFICE VISIT (OUTPATIENT)
Dept: PODIATRY | Facility: CLINIC | Age: 60
End: 2025-03-25
Payer: COMMERCIAL

## 2025-03-25 ENCOUNTER — ANCILLARY PROCEDURE (OUTPATIENT)
Dept: GENERAL RADIOLOGY | Facility: CLINIC | Age: 60
End: 2025-03-25
Attending: PODIATRIST
Payer: COMMERCIAL

## 2025-03-25 DIAGNOSIS — M20.42 HAMMERTOE OF LEFT FOOT: ICD-10-CM

## 2025-03-25 DIAGNOSIS — M20.12 HAV (HALLUX ABDUCTO VALGUS), LEFT: ICD-10-CM

## 2025-03-25 DIAGNOSIS — S93.145S: ICD-10-CM

## 2025-03-25 DIAGNOSIS — M79.672 LEFT FOOT PAIN: Primary | ICD-10-CM

## 2025-03-25 DIAGNOSIS — M79.672 LEFT FOOT PAIN: ICD-10-CM

## 2025-03-25 DIAGNOSIS — M25.80: ICD-10-CM

## 2025-03-25 DIAGNOSIS — M20.5X2 HALLUX LIMITUS, LEFT: ICD-10-CM

## 2025-03-25 PROCEDURE — 99244 OFF/OP CNSLTJ NEW/EST MOD 40: CPT | Performed by: PODIATRIST

## 2025-03-25 PROCEDURE — 73630 X-RAY EXAM OF FOOT: CPT | Mod: TC | Performed by: RADIOLOGY

## 2025-03-25 NOTE — PROGRESS NOTES
PATIENT HISTORY:  Dr. Keane requested I see this patient for their foot issue.  Sridevi Centeno is a 59 year old female who presents to clinic for left toe pain.  Left foot bunion.  States she has been having pain for 3 months to the left foot.  States that she was walking her dog when her foot hit the curb.  She had instant pain.  Can be 7 out of 10.  She injured it initially and was in a boot.  Hurts with weightbearing still and is constant.  Notes that it does feel better in the boot but her boot does not fit anymore.    Review of Systems:  Patient denies fever, chills, rash, wound, stiffness, limping, numbness, weakness, heart burn, blood in stool, chest pain with activity, calf pain when walking, shortness of breath with activity, chronic cough, easy bleeding/bruising, swelling of ankles, excessive thirst, fatigue, depression, anxiety.  Patient admits to limping at times..     PAST MEDICAL HISTORY:   Past Medical History:   Diagnosis Date    Acute saddle pulmonary embolism, unspecified whether acute cor pulmonale present (H) 09/28/2023 9/11/2023, [facility]  Plan indefinite anticoagulation, DOAC      Anxiety disorder     Arthritis     Bladder wall thickening     Brachial neuritis or radiculitis     Cervical spinal stenosis     Chronic pain     Constipation     Degenerative cervical disc     Edema     Generalized anxiety disorder     Hematuria     Hydronephrosis     right    Hypercholesteremia     Hypercholesteremia     Hypertension     Hypertension     Hypokalemia     Hypomagnesemia     Lumbar radiculopathy     Major depression     Osteoarthritis     Patellofemoral syndrome     left knee    Prinzmetal's angina     Prinzmetal's angina 01/01/2003    Surgical menopause     Urge incontinence of urine         PAST SURGICAL HISTORY:   Past Surgical History:   Procedure Laterality Date    ANTERIOR FUSION CERVICAL SPINE  05/2018    C2 TO C 5 WITH CORPECTOMY C3-4, POSTERIOR C2-C6    HYSTERECTOMY  1993    IR  CERVICAL EPIDURAL STEROID INJECTION  8/13/2003    IR CERVICAL EPIDURAL STEROID INJECTION  8/28/2003    IR CERVICAL EPIDURAL STEROID INJECTION  11/3/2003    IR CERVICAL EPIDURAL STEROID INJECTION  8/24/2004    IR CERVICAL EPIDURAL STEROID INJECTION  12/7/2004    IR CERVICAL EPIDURAL STEROID INJECTION  9/6/2005    IR CERVICAL EPIDURAL STEROID INJECTION  4/11/2006    IR CERVICAL EPIDURAL STEROID INJECTION  2/6/2013    IR CERVICAL EPIDURAL STEROID INJECTION  5/10/2013    IR LUMBAR EPIDURAL STEROID INJECTION  12/18/2012    OOPHORECTOMY Bilateral 1993    ORIF WRIST FRACTURE Right 1998    [facility] APPENDECTOMY      Description: Appendectomy;  Recorded: 11/01/2011;    [facility] CERV SPINE FUSN,ANTER,BELOW C2  1992    Description: Cervical Vertebral Fusion;  Recorded: 11/01/2011;    [facility] TOTAL ABDOM HYSTERECTOMY      Description: Total Abdominal Hysterectomy;  Proc Date: 01/01/1993;        MEDICATIONS:   Current Outpatient Medications:     albuterol (PROAIR HFA/PROVENTIL HFA/VENTOLIN HFA) 108 (90 Base) MCG/ACT inhaler, Inhale 2 puffs into the lungs every 6 hours, Disp: 18 g, Rfl: 2    apixaban ANTICOAGULANT (ELIQUIS ANTICOAGULANT) 5 MG tablet, Take 1 tablet (5 mg) by mouth 2 times daily, Disp: 180 tablet, Rfl: 3    aspirin-acetaminophen-caffeine (EXCEDRIN MIGRAINE) 250-250-65 MG tablet, Take 1 tablet by mouth daily as needed for headaches, Disp: , Rfl:     bisacodyl (DULCOLAX) 10 MG suppository, Place 1 suppository (10 mg) rectally daily as needed for constipation., Disp: 4 suppository, Rfl: 3    DULoxetine (CYMBALTA) 30 MG capsule, Take 1 capsule (30 mg) by mouth 2 times daily., Disp: 180 capsule, Rfl: 3    ferrous sulfate (FEROSUL) 325 (65 Fe) MG tablet, Take 1 tablet (325 mg) by mouth every other day, Disp: 90 tablet, Rfl: 1    oxyCODONE-acetaminophen (PERCOCET)  MG per tablet, Take 1 tablet by mouth every 4 hours as needed for severe pain, Disp: , Rfl:     polyethylene glycol (MIRALAX) 17 GM/Dose powder, Take 17 g (1 Capful)  by mouth daily., Disp: 510 g, Rfl: 3    Pregabalin (LYRICA) 200 MG capsule, , Disp: , Rfl:      ALLERGIES:    Allergies   Allergen Reactions    Hydrocodone-Acetaminophen GI Disturbance and Nausea    Ibuprofen Nausea        SOCIAL HISTORY:   Social History     Socioeconomic History    Marital status: Single     Spouse name: Not on file    Number of children: Not on file    Years of education: Not on file    Highest education level: Not on file   Occupational History    Not on file   Tobacco Use    Smoking status: Former     Types: Cigarettes     Passive exposure: Never    Smokeless tobacco: Never   Vaping Use    Vaping status: Never Used   Substance and Sexual Activity    Alcohol use: Never    Drug use: No    Sexual activity: Not Currently   Other Topics Concern    Not on file   Social History Narrative    Not on file     Social Drivers of Health     Financial Resource Strain: Low Risk  (10/17/2024)    Received from [employer]    Financial Resource Strain     Difficulty of Paying Living Expenses: 3     Difficulty of Paying Living Expenses: Not on file   Food Insecurity: No Food Insecurity (2/18/2025)    Received from [facility]    Food Insecurity     Do you worry your food will run out before you are able to buy more?: 1   Transportation Needs: No Transportation Needs (2/18/2025)    Received from [facility]    Transportation Needs     Does lack of transportation keep you from medical appointments?: 1     Does lack of transportation keep you from work, meetings or getting things that you need?: 1   Physical Activity: Unknown (1/31/2024)    Exercise Vital Sign     Days of Exercise per Week: 7 days     Minutes of Exercise per Session: Not on file   Stress: Stress Concern Present (1/31/2024)    [employer] - Occupational Stress Questionnaire     Feeling of Stress : Very much   Social  Connections: Socially Integrated (2/18/2025)    Received from [employer] [facility]    Social Connections     Do you often feel lonely or isolated from those around you?: 0   Interpersonal Safety: Not At Risk (12/17/2024)    Received from [facility]     Humiliation, Afraid, Rape, and Kick questionnaire     Fear of Current or Ex-Partner: No     Emotionally Abused: No     Physically Abused: No     Sexually Abused: No   Housing Stability: Low Risk  (2/18/2025)    Received from [employer] [facility]    Housing Stability     What is your housing situation today?: 1        FAMILY HISTORY:   Family History   Problem Relation Age of Onset    No Known Problems Mother     No Known Problems Father     No Known Problems Sister     No Known Problems Daughter     No Known Problems Maternal Grandmother     No Known Problems Maternal Grandfather     No Known Problems Paternal Grandmother     No Known Problems Paternal Grandfather     No Known Problems Maternal Aunt     No Known Problems Paternal Aunt     Hereditary Breast and Ovarian Cancer Syndrome No family hx of     Breast Cancer No family hx of     Cancer No family hx of     Colon Cancer No family hx of     Endometrial Cancer No family hx of     Ovarian Cancer No family hx of         EXAM:Vitals: LMP  (LMP Unknown)   BMI= There is no height or weight on file to calculate BMI.    General appearance: Patient is alert and fully cooperative with history & exam.  No sign of distress is noted during the visit.     Psychiatric: Affect is pleasant & appropriate.  Patient appears motivated to improve health.     Respiratory: Breathing is regular & unlabored while sitting.     HEENT: Hearing is intact to spoken word.  Speech is clear.  No gross evidence of visual impairment that would impact ambulation.     Dermatologic: Skin is intact to both lower extremities without significant lesions, rash or abrasion.  No paronychia or  evidence of soft tissue infection is noted.     Vascular: DP & PT pulses are intact & regular bilaterally.   edema but no varicosities noted.  CFT and skin temperature is normal to both lower extremities.     Neurologic: Lower extremity sensation is intact to light touch.  No evidence of weakness or contracture in the lower extremities.  No evidence of neuropathy.     Musculoskeletal: Patient is ambulatory without assistive device or brace.  Pain on palpation of the left first and second toes and with range of motion of the metatarsal phalangeal joints.  Third 1 is also slightly sore.    Radiographs: left foot xray -  I personally reviewed the xrays.  Increased first and second intermetatarsal angle left foot with tibial sesamoid position of 5.  Previously healed base of proximal phalanx fracture but there does appear to be is some bony spurring within the joint that could be causing some impingement.  Elongated second metatarsal with subluxed second toe on the metatarsal head.  No acute fractures are noted.     ASSESSMENT:    Left foot pain  Hav (hallux abducto valgus), left  Joint impingement affecting soft tissue  Hallux limitus, left  Hammertoe of left foot  Subluxation of metatarsophalangeal joint of lesser toe of left foot, sequela       Medical Decision Making/Plan:  Reviewed patient[facility] in [facility].  Reviewed and discussed x-rays. Reviewed and discussed causes of bunion with patient.  Explained that it is in large part due to a patients foot type and how they walk.   Discussed treatment options with patient including orthotics, splints, pads, and shoe gear.  We discussed that sometimes cortisone injections can help with the pain or physical therapy treatments such as ultrasound to help with pain but does not fix the problem.  Discussed that this is normally a structural issue in the foot and if conservative therapy doesn't work, surgery is considered.  Distal osteotomy versus fusion.  With a distal osteotomy  procedure, patient is normally minimally weight bearing in a cam boot for 6 weeks.  With fusion, patient is normally non weight bearing for 6 weeks.  With any surgery, patient needs to take the first 2 weeks off work for icing and elevating and could possible due seated work only for the remaining 4 weeks after that.  We discussed risks of surgery including infection, neuritis, non union, need for further surgery.     Reviewed and discussed causes of hallux limitus with patient.  Explained that it is a progressive arthritis meaning that over time, there is decrease in the joint space as well as bony spurring that occurs which leads to pain in the big toe especially with bending motions of the big toe joint.    Discussed treatment options with patient including rigid soled shoes or orthotics that are stiff under the big toe that help to prevent motion at that joint which is leading to pain and inflammation.  We discussed that sometimes cortisone injections can help with the pain or physical therapy treatments such as ultrasound.  Discussed that this is normally a structural issue in the foot and if conservative therapy doesn't work, surgery is considered.    We discussed that depending on the quality of the cartilage and/or of the joint determines if patient will need a joint sparing or fusion procedure.  Discussed that this can usually not be determined by x-ray and is an intra- op position.  With joint sparing procedure, and implant is placed in the joint to try to help keep the range of motion at that the toe. Patient is normally minimally weight bearing in a cam boot for 3 weeks.  With fusion, patient is normally non weight bearing for 2 weeks, then minimal weight bearing for 4 weeks in boot.     Reviewed and discussed causes of hammertoes with patient.  Explained that this can be caused by an overpowering of muscles or by the way we walk.  Discussed conservative treatments such as orthotics, pads, shoe gear.   Explained that sometimes the flexor tendons can be cut to try and straighten the toe and reduce rubbing. This is normally done in office and patient is weight bearing in postop she for 1-2 weeks.  We also discussed surgical intervention to remove the joint and possibly fuse the toe.  Normally patient has a pin sticking out of the toe for about 6 weeks and can not get the foot wet. Patient would have to be minimal weight bearing in cam boot.      She appears to have some bony fragments in the joint which could be leftover from healing and causing impingement in the first metatarsal phalangeal joint.  Also notes subluxation of the second toe on the metatarsal head on x-ray where she is having her acute pain.  She states that these were not this way before the injury.  Will get an MRI to assess for any capsular ligament tearing but discussed that she would likely need a first MPJ fusion as well as a shortening metatarsal osteotomy on the second and third metatarsals with PIPJ fusion of the second toe.  We will MyChart or call her with the MRI results and if that is the case if she wishes to proceed with surgery we will schedule it and have her follow-up for surgical discussion.  She was fitted with a short Aircast boot today.    Patient risk factor: Patient is at medium risk for infection.     All questions were answered to patients satisfaction and they will call with further questions or concerns.       Elin Olivo DPM, Podiatry/Foot and Ankle Surgery

## 2025-03-25 NOTE — LETTER
3/25/2025      Sridevi Centeno  [address]  [place] [address]      Dear Colleague,    Thank you for referring your patient, Sridevi Centeno, to the [facility]. Please see a copy of my visit note below.    PATIENT HISTORY:  Dr. Keane requested I see this patient for their foot issue.  Sridevi Centeno is a 59 year old female who presents to clinic for left toe pain.  Left foot bunion.  States she has been having pain for 3 months to the left foot.  States that she was walking her dog when her foot hit the curb.  She had instant pain.  Can be 7 out of 10.  She injured it initially and was in a boot.  Hurts with weightbearing still and is constant.  Notes that it does feel better in the boot but her boot does not fit anymore.    Review of Systems:  Patient denies fever, chills, rash, wound, stiffness, limping, numbness, weakness, heart burn, blood in stool, chest pain with activity, calf pain when walking, shortness of breath with activity, chronic cough, easy bleeding/bruising, swelling of ankles, excessive thirst, fatigue, depression, anxiety.  Patient admits to limping at times..     PAST MEDICAL HISTORY:   Past Medical History:   Diagnosis Date     Acute saddle pulmonary embolism, unspecified whether acute cor pulmonale present (H) 09/28/2023 9/11/2023, [facility]  Plan indefinite anticoagulation, DOAC       Anxiety disorder      Arthritis      Bladder wall thickening      Brachial neuritis or radiculitis      Cervical spinal stenosis      Chronic pain      Constipation      Degenerative cervical disc      Edema      Generalized anxiety disorder      Hematuria      Hydronephrosis     right     Hypercholesteremia      Hypercholesteremia      Hypertension      Hypertension      Hypokalemia      Hypomagnesemia      Lumbar radiculopathy      Major depression      Osteoarthritis      Patellofemoral syndrome     left knee     Prinzmetal's angina      Prinzmetal's  angina 01/01/2003     Surgical menopause      Urge incontinence of urine         PAST SURGICAL HISTORY:   Past Surgical History:   Procedure Laterality Date     ANTERIOR FUSION CERVICAL SPINE  05/2018    C2 TO C 5 WITH CORPECTOMY C3-4, POSTERIOR C2-C6     HYSTERECTOMY  1993     IR CERVICAL EPIDURAL STEROID INJECTION  8/13/2003     IR CERVICAL EPIDURAL STEROID INJECTION  8/28/2003     IR CERVICAL EPIDURAL STEROID INJECTION  11/3/2003     IR CERVICAL EPIDURAL STEROID INJECTION  8/24/2004     IR CERVICAL EPIDURAL STEROID INJECTION  12/7/2004     IR CERVICAL EPIDURAL STEROID INJECTION  9/6/2005     IR CERVICAL EPIDURAL STEROID INJECTION  4/11/2006     IR CERVICAL EPIDURAL STEROID INJECTION  2/6/2013     IR CERVICAL EPIDURAL STEROID INJECTION  5/10/2013     IR LUMBAR EPIDURAL STEROID INJECTION  12/18/2012     OOPHORECTOMY Bilateral 1993     ORIF WRIST FRACTURE Right 1998     [facility] APPENDECTOMY      Description: Appendectomy;  Recorded: 11/01/2011;     [facility] CERV SPINE FUSN,ANTER,BELOW C2  1992    Description: Cervical Vertebral Fusion;  Recorded: 11/01/2011;     [facility] TOTAL ABDOM HYSTERECTOMY      Description: Total Abdominal Hysterectomy;  Proc Date: 01/01/1993;        MEDICATIONS:   Current Outpatient Medications:      albuterol (PROAIR HFA/PROVENTIL HFA/VENTOLIN HFA) 108 (90 Base) MCG/ACT inhaler, Inhale 2 puffs into the lungs every 6 hours, Disp: 18 g, Rfl: 2     apixaban ANTICOAGULANT (ELIQUIS ANTICOAGULANT) 5 MG tablet, Take 1 tablet (5 mg) by mouth 2 times daily, Disp: 180 tablet, Rfl: 3     aspirin-acetaminophen-caffeine (EXCEDRIN MIGRAINE) 250-250-65 MG tablet, Take 1 tablet by mouth daily as needed for headaches, Disp: , Rfl:      bisacodyl (DULCOLAX) 10 MG suppository, Place 1 suppository (10 mg) rectally daily as needed for constipation., Disp: 4 suppository, Rfl: 3     DULoxetine (CYMBALTA) 30 MG capsule, Take 1 capsule (30 mg) by mouth 2 times daily., Disp: 180 capsule, Rfl: 3     ferrous sulfate (FEROSUL) 325  (65 Fe) MG tablet, Take 1 tablet (325 mg) by mouth every other day, Disp: 90 tablet, Rfl: 1     oxyCODONE-acetaminophen (PERCOCET)  MG per tablet, Take 1 tablet by mouth every 4 hours as needed for severe pain, Disp: , Rfl:      polyethylene glycol (MIRALAX) 17 GM/Dose powder, Take 17 g (1 Capful) by mouth daily., Disp: 510 g, Rfl: 3     Pregabalin (LYRICA) 200 MG capsule, , Disp: , Rfl:      ALLERGIES:    Allergies   Allergen Reactions     Hydrocodone-Acetaminophen GI Disturbance and Nausea     Ibuprofen Nausea        SOCIAL HISTORY:   Social History     Socioeconomic History     Marital status: Single     Spouse name: Not on file     Number of children: Not on file     Years of education: Not on file     Highest education level: Not on file   Occupational History     Not on file   Tobacco Use     Smoking status: Former     Types: Cigarettes     Passive exposure: Never     Smokeless tobacco: Never   Vaping Use     Vaping status: Never Used   Substance and Sexual Activity     Alcohol use: Never     Drug use: No     Sexual activity: Not Currently   Other Topics Concern     Not on file   Social History Narrative     Not on file     Social Drivers of Health     Financial Resource Strain: Low Risk  (10/17/2024)    Received from [email or web address]    Financial Resource Strain      Difficulty of Paying Living Expenses: 3      Difficulty of Paying Living Expenses: Not on file   Food Insecurity: No Food Insecurity (2/18/2025)    Received from [email or web address]    Food Insecurity      Do you worry your food will run out before you are able to buy more?: 1   Transportation Needs: No Transportation Needs (2/18/2025)    Received from [email or web address]    Transportation Needs      Does lack of transportation keep you from medical appointments?: 1      Does lack of transportation keep you from work, meetings or getting things that you need?: 1    Physical Activity: Unknown (1/31/2024)    Exercise Vital Sign      Days of Exercise per Week: 7 days      Minutes of Exercise per Session: Not on file   Stress: Stress Concern Present (1/31/2024)    [employer] - Occupational Stress Questionnaire      Feeling of Stress : Very much   Social Connections: Socially Integrated (2/18/2025)    Received from [facility]    Social Connections      Do you often feel lonely or isolated from those around you?: 0   Interpersonal Safety: Not At Risk (12/17/2024)    Received from [facility]     Humiliation, Afraid, Rape, and Kick questionnaire      Fear of Current or Ex-Partner: No      Emotionally Abused: No      Physically Abused: No      Sexually Abused: No   Housing Stability: Low Risk  (2/18/2025)    [facility] from [facility]    Housing Stability      What is your housing situation today?: 1        FAMILY HISTORY:   Family History   Problem Relation Age of Onset     No Known Problems Mother      No Known Problems Father      No Known Problems Sister      No Known Problems Daughter      No Known Problems Maternal Grandmother      No Known Problems Maternal Grandfather      No Known Problems Paternal Grandmother      No Known Problems Paternal Grandfather      No Known Problems Maternal Aunt      No Known Problems Paternal Aunt      Hereditary Breast and Ovarian Cancer Syndrome No family hx of      Breast Cancer No family hx of      Cancer No family hx of      Colon Cancer No family hx of      Endometrial Cancer No family hx of      Ovarian Cancer No family hx of         EXAM:Vitals: LMP  (LMP Unknown)   BMI= There is no height or weight on file to calculate BMI.    General appearance: Patient is alert and fully cooperative with history & exam.  No sign of distress is noted during the visit.     Psychiatric: Affect is pleasant & appropriate.  Patient appears motivated to  improve health.     Respiratory: Breathing is regular & unlabored while sitting.     HEENT: Hearing is intact to spoken word.  Speech is clear.  No gross evidence of visual impairment that would impact ambulation.     Dermatologic: Skin is intact to both lower extremities without significant lesions, rash or abrasion.  No paronychia or evidence of soft tissue infection is noted.     Vascular: DP & PT pulses are intact & regular bilaterally.   edema but no varicosities noted.  CFT and skin temperature is normal to both lower extremities.     Neurologic: Lower extremity sensation is intact to light touch.  No evidence of weakness or contracture in the lower extremities.  No evidence of neuropathy.     Musculoskeletal: Patient is ambulatory without assistive device or brace.  Pain on palpation of the left first and second toes and with range of motion of the metatarsal phalangeal joints.  Third 1 is also slightly sore.    Radiographs: left foot xray -  I personally reviewed the xrays.  Increased first and second intermetatarsal angle left foot with tibial sesamoid position of 5.  Previously healed base of proximal phalanx fracture but there does appear to be is some bony spurring within the joint that could be causing some impingement.  Elongated second metatarsal with subluxed second toe on the metatarsal head.  No acute fractures are noted.     ASSESSMENT:    Left foot pain  Hav (hallux abducto valgus), left  Joint impingement affecting soft tissue  Hallux limitus, left  Hammertoe of left foot  Subluxation of metatarsophalangeal joint of lesser toe of left foot, sequela       Medical Decision Making/Plan:  Reviewed patient's chart in [facility].  Reviewed and discussed x-rays. Reviewed and discussed causes of bunion with patient.  Explained that it is in large part due to a patients foot type and how they walk.   Discussed treatment options with patient including orthotics, splints, pads, and shoe gear.  We discussed that  sometimes cortisone injections can help with the pain or physical therapy treatments such as ultrasound to help with pain but does not fix the problem.  Discussed that this is normally a structural issue in the foot and if conservative therapy doesn't work, surgery is considered.  Distal osteotomy versus fusion.  With a distal osteotomy procedure, patient is normally minimally weight bearing in a cam boot for 6 weeks.  With fusion, patient is normally non weight bearing for 6 weeks.  With any surgery, patient needs to take the first 2 weeks off work for icing and elevating and could possible due seated work only for the remaining 4 weeks after that.  We discussed risks of surgery including infection, neuritis, non union, need for further surgery.     Reviewed and discussed causes of hallux limitus with patient.  Explained that it is a progressive arthritis meaning that over time, there is decrease in the joint space as well as bony spurring that occurs which leads to pain in the big toe especially with bending motions of the big toe joint.    Discussed treatment options with patient including rigid soled shoes or orthotics that are stiff under the big toe that help to prevent motion at that joint which is leading to pain and inflammation.  We discussed that sometimes cortisone injections can help with the pain or physical therapy treatments such as ultrasound.  Discussed that this is normally a structural issue in the foot and if conservative therapy doesn't work, surgery is considered.    We discussed that depending on the quality of the cartilage and/or of the joint determines if patient will need a joint sparing or fusion procedure.  Discussed that this can usually not be determined by x-ray and is an intra- op position.  With joint sparing procedure, and implant is placed in the joint to try to help keep the range of motion at that the toe. Patient is normally minimally weight bearing in a cam boot for 3 weeks.   With fusion, patient is normally non weight bearing for 2 weeks, then minimal weight bearing for 4 weeks in boot.     Reviewed and discussed causes of hammertoes with patient.  Explained that this can be caused by an overpowering of muscles or by the way we walk.  Discussed conservative treatments such as orthotics, pads, shoe gear.  Explained that sometimes the flexor tendons can be cut to try and straighten the toe and reduce rubbing. This is normally done in office and patient is weight bearing in postop she for 1-2 weeks.  We also discussed surgical intervention to remove the joint and possibly fuse the toe.  Normally patient has a pin sticking out of the toe for about 6 weeks and can not get the foot wet. Patient would have to be minimal weight bearing in cam boot.      She appears to have some bony fragments in the joint which could be leftover from healing and causing impingement in the first metatarsal phalangeal joint.  Also notes subluxation of the second toe on the metatarsal head on x-ray where she is having her acute pain.  She states that these were not this way before the injury.  Will get an MRI to assess for any capsular ligament tearing but discussed that she would likely need a first MPJ fusion as well as a shortening metatarsal osteotomy on the second and third metatarsals with PIPJ fusion of the second toe.  We will MyChart or call her with the MRI results and if that is the case if she wishes to proceed with surgery we will schedule it and have her follow-up for surgical discussion.  She was fitted with a short Aircast boot today.    Patient risk factor: Patient is at medium risk for infection.     All questions were answered to patients satisfaction and they will call with further questions or concerns.       Elin Olivo DPM, Podiatry/Foot and Ankle Surgery      Again, thank you for allowing me to participate in the care of your patient.        Sincerely,        Elin Olivo DPM,  Podiatry/Foot and Ankle Surgery    Electronically signed

## 2025-03-25 NOTE — PATIENT INSTRUCTIONS
Please call to schedule your MRI/CT/Ultrasound/Arthrogram appointment.  The number is [phone].    For EMG (Motor Nerve Testing):  Please call [facility]:  [phone], option #2  Thank you for choosing [facility] / Foot & Ankle Surgery!    DR RAMOS'S CLINIC:  [facility]   [address]   [place] [address]   (Tues, Wed, Thurs AM, Fri PM)       [facility]  [address], [place] [address]  (Every other Fri AM)    [facility]  [address] [place] [address]  (Every other Friday)       TRIAGE LINE: [phone]  APPOINTMENTS: [phone]  RADIOLOGY: [phone]  SET UP SURGERY: [phone]  PHYSICAL THERAPY: [phone]   BILLING QUESTIONS: [phone]  FAX: [phone]       Follow up: After MRI results    GETTING READY FOR YOUR SURGERY  ONE-THREE WEEKS BEFORE  1. See your Family Doctor or Primary Care Doctor for a History and Physical. If you do not, we may need to change the date of your surgery.  2. Please see pre-surgical medications below to which medications need to be stopped before surgery and when.    TEN OR MORE DAYS BEFORE    1. [place] with the hospital. (For [facility])      By Phone: [phone].      By Internet: [email or web address]. Choose [facility].      If you do not register by phone or online, we will call to help you register.    SAME DAY SURGERY PATIENTS  1. You will need a family member of friend to drive you home. If you do not have one the surgery will be cancelled or rescheduled.  2. You will need a responsible adult to stay with you that night after the surgery.       We will ask this person to listen to some instructions before you leave the hospital.  * If your child is having surgery, and you would like a tour of the hospital, please call: [phone].      DAY BEFORE SURGERY  1. DO NOT EAT OR DRINK ANYTHING AFTER MIDNIGHT THE  NIGHT BEFORE YOUR SURGERY!   2. DO NOT DRINK ALCOHOL.  3. Do not take over the counter drugs.  4. Some people need to have blood tests at the hospital. If you need blood tests, we will tell you in advance.  5. Take medications as directed by your doctor. You may take these with a small amount of water.  6. Do not chew gum, chew tobacco, or suck on hard candy the day of surgery.  7. Bring your insurance cards, a list of your medicines and co-pays you might need. Leave jewelry and other valuables at home.  8. If you received papers at your doctor's office, bring these with you to the surgery.    If you have questions about these instructions, please call: [phone]  Ask to speak with a pre-admitting nurse.    PRESURGICAL MEDICATIONS:  Certain prescription, over-the-counter, and herbal medications interfere with healing after an operation. The main concern relates to medications that increase bleeding at the surgical site. Excess blood under the incision results in poor wound healing, excess pain, increased scarring, and a higher risk of infection.    Some medications slow the healing process of bone. Medications can also interfere with the anesthesia drugs that keep you asleep during the operation. It is important to ensure that these medications are out of your system prior to the operation. The list below details a number of medications that are of concern. Pay special attention to how long you should avoid these medications before your operation. Please note that this list is not complete. You should ask your surgeon or pharmacist if you are uncertain about other medications. Any herbal supplement not listed should be discontinued at least one week prior to surgery.    Aspirin: Hold for one week prior to surgery and restart the day after surgery. This over the counter medication promotes bleeding.    Motrin / Ibuprofen / Aleve / Advil / NSAIDS:  Stop one week prior to surgery. These medications affect bleeding  and may cause delay in bone healing. Avoid taking these medications for six weeks after bone surgeries. Other procedures may allow you to restart sooner than 6 weeks after surgery.    Coumadin / Plavix: Your primary care provider will manage Coumadin in relation to surgery. Coumadin may result in excessive bleeding and may be adjusted before and after surgery.    Enbriel: Stop two weeks prior to surgery and restart two weeks after surgery. This medication can effect soft tissue healing and increases the risk of infection.    Remicade: Stop 8-12 weeks before surgery and restart two weeks after surgery. This medication can affect soft tissue healing and increases the risk of infection.    Humira: Stop 4 weeks before surgery and restart two weeks after surgery. This medication can affect soft tissue healing and increases the risk of infection.    Methotrexate: Stop one dose prior to surgery. This medication will be restarted when the wound appears to be healing well. Please ask your surgeon about restarting this medication when you are being seen in the office for wound checks.    Kava: Stop at least one day prior to surgery and may restart one day after surgery. Kava may increase the sedative effect of anesthetics that are given during the operation. Kava can also increase bleeding at the surgical site.    Ephedra (ma horowitz): Stop at least one day prior to surgery and may restart one day after surgery.  Ephedra may increase the risk of heart attack and stroke. This medication can also increase bleeding at the surgical site.    Vanderwagen's Wort: Stop at least five days before surgery and may restart one day after surgery. Alexis's wort may diminish the effects of several drugs that are given during surgery.    Ginseng: Stop at least one week prior to surgery and may restart one day after surgery.  Ginseng lowers blood sugar and may increase bleeding at the surgical site.    Ginkgo: Stop 36 hours before surgery and may  restart one day after surgery. Ginkgo may increase bleeding at the surgical site.    Garlic: Stop at least one week prior to surgery and may restart one day after. Garlic may increase bleeding at the surgery site.    Valerian: Do a slow steady decrease in your daily dose over a period of 2-3 weeks before surgery to decrease the chance of withdrawal symptoms. Valerian may increase the sedative effect of anesthetics given during the operation.    Echinacea: There is no data on stopping echinacea prior to surgery. This medication though can be associated with allergic reactions and suppression of your immune system.    Vitamin E, Omega-3, Flax, Fish Oil, Glucosamine and Chondroitin: Stop 2 weeks prior to surgery and may restart one day after. These herbal medications can increase risk of bleeding at surgical site.   POST OPERATIVE HOME CARE INSTRUCTIONS  Activities: You should rest today. Stay off your feet as much as possible and keep your foot elevated above the level of your heart (about two pillow height). Wear your surgical shoe at all times when up. Limit walking to 5 to 10 minutes per hour over the next few days if your doctor has previously told you that you can put some weight on the foot after surgery, although limit the weight to your heel. If you are supposed to be non-weight bearing, that means NO WEIGHT AT ALL ON THE FOOT. Use an ice pack on the ankle while awake 20 minutes per hour to help decrease pain and swelling.     Discomfort: If a prescription for pain was given, take as directed. If no pain medication was ordered, you may take a non-prescription, non-aspirin pain medication. If the pain is not relieved by pain medication, call the clinic.     Incision and Dressing: Your surgical dressing is a sterile dressing and should be left in place until removed by your physician. Keep the dressing dry by covering it with a plastic bag for showers, taking baths with the surgical foot out of the tub, or  sponge bathing. Some bleeding on the dressing should be expected. If however, you notice active or excessive bleeding or a temperature over 100 degrees by mouth, call the clinic. Do not change dressing by yourself.  If the dressing becomes wet or dirty, please call the clinic as it may need a new sterile dressing applied. You may start getting the foot wet after the stitches are removed.     Do not wear regular shoes with a surgical bandage and/or external pins in your foot. Wear loose fitting clothing that easily will slip over the bandage and/or pins. Do not cover your surgical foot with blankets as they may damage the dressing/pins. Also, remember that dogs are not aware of your surgery. Please keep them away from the bandage/pins.   If your surgeon places external pins in your foot, you must keep the foot dry until the pins are removed at 6-8 weeks after the surgery. Pins should be covered with a dressing for protection. You should examine the pins and your skin often. Check for any spreading redness or yellow drainage from the pin areas. Do not apply ointment around the pins. Do not push a loose pin back into your foot. Please call the clinic if the pin is spinning or moving in and out. If the pins are bumped or loosened they may need to be removed early. This may affect your surgical outcome.   Please call the clinic if you feel there is a problem with your pins and/or surgical bandage.    TIPS FOR SUCCESSFUL HEALING  How you care for the surgical site is critically important to achieve a successful result after surgery. Avoidance of injury, infection, excess swelling, scar tissue and stiffness are highly dependent on the care you provide over the next six weeks. Please do not hesitate to call if you have questions or concerns.   Your foot requires significant rest and elevation. Sitting for long hours with your foot elevated, however, will create its own problems. Expect muscle aches, back pain, cramps, etc.  Optimal posture, lumbar support, back exercises, ice and heat may all help with your new aches and pains. Do not apply a heating pad to your foot or leg as this can cause increase swelling and pain. Rather use ice in those areas.   Pain medications cause drowsiness. You may frequently sleep during the day and then have trouble sleeping at night. Over the counter sleep aids might be more effective than narcotic pain medication to achieve a reasonable night's sleep.    Narcotic pain medications and inactivity lead to constipation. Limiting use of narcotics will help minimize this problem. The pain medications will not completely alleviate your pain. The purpose of pain pills is to take the edge off and help you get through the first few days. You can substitute Extra Strength Tylenol if pain is mild. Please note that narcotic pain pills usually contain acetaminophen (the active ingredient in Tylenol) so be careful to avoid the maximum dose of acetaminophen. You should take measures to avoid constipation by drinking plenty of water, eating lots of fruit and vegetables and taking the recommended dose of Metamucil or a similar fiber supplement. These measures should be continued for as long as you require narcotic pain medications and are inactive.     Showering is a major challenge. Your incision requires about three days to become sealed from water. Your bandage should not get wet and should not be removed. Do not attempt showering for the first three days. A sponge bath is preferred. You may attempt to shower on the fourth day after the operation. Your foot should be covered with a bag, tape and rubber bands. Double bagging is preferred. Standing in the shower with a bag on your foot is quite hazardous. A portable shower stool would be ideal. The bandage will need to be changed in the office if it becomes moistened. A moist bandage will not dry on its own. A moist dressing may lead to infection.   Stiffness will  develop after any operation due to scarring. The scar tissue begins to form immediately after the surgery. Inactivity can cause excess stiffness and may lead to blood clots in your legs. Frequent range of motion exercises will help decrease stiffness, blood clots, scar tissue and adhesions. Please call if you are unsure about these recommendations.   Good luck and best wishes on a prompt recovery. Healing is slow but an important step in your recovery. You are in control of the final result. Please use this time wisely. Please do not hesitate to call if you have questions, concerns or comments.    * If you have any post-operative questions or concerns regarding your procedure, call our triage team at the [facility] at [phone]  HAMMERTOES  Hammertoe is a contracture (bending) of one or both joints of the second, third, fourth, or fifth (little) toes. This abnormal bending can put pressure on the toe when wearing shoes, causing problems to develop.  Hammertoes usually start out as mild deformities and get progressively worse over time. In the earlier stages, hammertoes are flexible and the symptoms can often be managed with noninvasive measures. But if left untreated, hammertoes can become more rigid and will not respond to non-surgical treatment.  Because of the progressive nature of hammertoes, they should receive early attention. Hammertoes never get better without some kind of intervention.  CAUSES  The most common cause of hammertoe is a muscle/tendon imbalance. This imbalance, which leads to a bending of the toe, results from mechanical (structural) changes in the foot that occur over time in some people.  Hammertoes may be aggravated by shoes that don t fit properly. A hammertoe may result if a toe is too long and is forced into a cramped position when a tight shoe is worn.  Occasionally, hammertoe is the result of an earlier trauma to the toe. In some people, hammertoes are  inherited.  SYMPTOMS  Pain or irritation of the affected toe when wearing shoes.   Corns and calluses (a buildup of skin) on the toe, between two toes, or on the ball of the foot. Corns are caused by constant friction against the shoe. They may be soft or hard, depending upon their location.   Inflammation, redness, or a burning sensation   Contracture of the toe   In more severe cases of hammertoe, open sores may form.   DIAGNOSIS  Although hammertoes are readily apparent, to arrive at a diagnosis the foot and ankle surgeon will obtain a thorough history of your symptoms and examine your foot. During the physical examination, the doctor may attempt to reproduce your symptoms by manipulating your foot and will study the contractures of the toes. In addition, the foot and ankle surgeon may take x-rays to determine the degree of the deformities and assess any changes that may have occurred.   Hammertoes are progressive - they don t go away by themselves and usually they will get worse over time. However, not all cases are alike - some hammertoes progress more rapidly than others. Once your foot and ankle surgeon has evaluated your hammertoes, a treatment plan can be developed that is suited to your needs.  NON-SURGICAL TREATMENT  There is a variety of treatment options for hammertoe. The treatment your foot and ankle surgeon selects will depend upon the severity of your hammertoe and other factors.  A number of non-surgical measures can be undertaken:  Padding corns and calluses. Your foot and ankle surgeon can provide or prescribe pads designed to shield corns from irritation. If you want to try over-the-counter pads, avoid the medicated types. Medicated pads are generally not recommended because they may contain a small amount of acid that can be harmful. Consult your surgeon about this option.   Changes in shoewear. Avoid shoes with pointed toes, shoes that are too short, or shoes with high heels - conditions that  can force your toe against the front of the shoe. Instead, choose comfortable shoes with a deep, roomy toe box and heels no higher than two inches.   Orthotic devices. A custom orthotic device placed in your shoe may help control the muscle/tendon imbalance.   Injection therapy. Corticosteroid injections are sometimes used to ease pain and inflammation caused by hammertoe.   Medications. Oral nonsteroidal anti-inflammatory drugs (NSAIDs), such as ibuprofen, may be recommended to reduce pain and inflammation.   Splinting/strapping. Splints or small straps may be applied by the surgeon to realign the bent toe.   Exercises:   1. The Toe Tap  Stand flat on the ground in your bare feet. Raise all of your toes up off the ground as high as you can. Then starting with the little toes, slowly press them down to the ground. After the big toes are on the ground, start over by raising all of them up off the ground again. This motion is similar to tapping your fingers on a desk. Repeat this ten times.     2. Interlocking your Fingers and Toes  Cross your right foot over your knee and place the fingers of your left hand between your toes. Squeeze your toes together, pinching your fingers between them. Spread the toes apart and squeeze them together again. Repeat this ten times then do the other foot. Like most exercises, this will get easier the more you do it. If you are having a lot of difficulty with this exercise, start with just your index finger between your first and second toe, then later add your middle finger between your second and third toes, and so on until you can fit all your fingers between your toes. Do this ten times on each foot. Eventually you will be able to spread your toes apart without using your fingers.    3. Gripping the Floor   the floor by pressing the pads of your toes (not the tips) into the floor without curling your toes. Relax and repeat this ten times. If your shoes have the proper amount of  depth, you should be able to do this with shoes on.    HAMMERTOE TOE SURGERY   Hammertoe surgery is complex. The surgical procedure is an attempt to help the toe lay in a better position. Nearly every structure in the toe will be cut including the tendons, ligaments, skin and bone. Hammertoes are a complex deformity and final toe position is difficult to predict. The only sure way to position a toe is to fuse all three digital joints. That will not happen as some degree of toe motion is needed for walking. The toe may not be completely reduced as the surrounding skin and other structures may not allow the toe to return to a normal position. The tendons on adjacent toes may need to be cut at the time of the original or subsequent surgeries, as interconnections exist between the toes. The toe may drift after surgery. Stiffness may develop leading to new areas of pressure.   Future shoe choices will be critical in allowing the surgery to provide comfort. The toes will still hurt if shoes rub. The original pain may also persist as other foot problems may be contributing to the current pain. The toe may or may not be pinned in place. External pins would require complete avoidance of water on the foot for six weeks. The pin would be removed about six weeks after the surgery. Strict attention to protection is critical. The pin could get bumped or loosen resulting in early removal. Removal might be necessary before the bone heals which would negatively affect the final surgical outcome and toe allignment.   DEGENERATIVE ARTHRITIS OF THE BIG TOE JOINT   (hallux limitus/hallux rigidus)   Arthritis of the joint at the base of the big toe (metatarsophalangeal joint) has several causes. Usually it results from repetitive trauma to the joint, secondary to abnormal foot mechanics. Often it is hereditary. However, a one-time traumatic event can lead to arthritis. The condition doesn't improve with time, and even with treatment, can  "worsen. The cartilage wears out, joint surfaces are no longer smooth, bone rubs on bone, inflammation occurs with pain, and eventually bone spurs and loose fragments might develop.   The joint is often painful with activity, worse with flimsy shoes or walking barefoot, and it slowly progresses over time. A person might notice the toe \"locking up\" with walking. There often is an obvious, and irritating, bony bump on top of the foot. Shoes might be uncomfortable. In some people the pain is so bothersome that recreational activities sometimes even normal daily activities are difficult to perform.   The pain from this arthritis is likely a combination of joint jamming, cartilage loss and inflammation, and irritation from shoes rubbing on the bump. Sometimes other parts of the foot, leg, or back hurt from altering one's walk to compensate for the painful joint.     Ways to help a person live with the discomfort include wearing a good, supportive shoe with a rigid, rocker-type bottom. An example is a hiking boot. A rigid sole minimizes bending of the joint, and therefore, joint motion and pain. Shoes with a high toe box allow for less rubbing on the bump. Avoiding barefoot walking, sandals, flip-flops and slippers usually helps.   Sometimes an insert or orthotic provides symptom relief. This might make shoe fit more difficult. Pads over the bump and occasionally injections into the joint provide relief.   Surgery for this condition is aimed towards alleviating pain. It does not cure the arthritis nor does it guarantee better joint motion. Depending on the condition of the metatarsophalangeal joint, there are several surgiqal options:    1.  Cutting off the bony bump(s) and cleaning the joint    2.  Loosening the joint up by making cuts in the first metatarsal bone or the big toe bone and removing a small section of bone.    3.  Repositioning bone to minimize jamming of the joint.    4.  In severe cases, the joint is " fused. By fusing the joint, it will never bend again. This resolves the pain, because it's the movement of a worn out joint that causes pain. Oftentimes the operation involves a combination of these procedures and. requires the use of screws, pins, and/or a small surgical plate.     Healing after surgery requires about six weeks of protection. This allows the bone to heal. Maximum recovery takes about one year. The scar tissue and joint structures require this amount of time to finish the healing process. Expect stiffness, swelling and numbness during that time frame.   Surgery for arthritis of the metatarsophalangeal joint does involve side effects. Some side effects are predictable and others are less common but do occur. A scar will be visible and could be irritated by shoes. The shoe may rub on the screw or internal pin requiring surgical removal of these fixation devices. The screw and pin would likely be left in place for a full year. The first toe may remain stiff after surgery. The amount of stiffness is variable. Most people never regain normal motion of the first toe. This is due to scar tissue inherent to any surgery, in addition to the cumUlative effects of arthritis. Sometimes the big toe drifts to one side or the other. Joint fusion is one option to correct an unstable, drifting toe. This procedure straightens the toe, however, no motion remains.   All surgical procedures involve risk of infection, numbness, pain, delayed bone healing, osteotomy (bone cut) dislocation, blood clots, continued foot pain, etc. Arthritic joint surgery is quite complex and should not be taken lightly.    Any skin incision can lead to infection. Deep infection might involve the bone and thus repeat surgery and six weeks of IV antibiotics. Scar tissue can cause nerve pain or numbness. his is generally temporary but can be permanent. We do not have treatments that cure nerve problems. Second toe pain could be related to  altered mechanics and pressure transferred to the second toe. Delayed bone healing would lengthen the healing time. Some bones simply do not heal. This requires repeat surgery, electronic bone stimulation and/or extended protection. Smokers have an approximate 20% chance of poor bone healing. This is double that of a non-smoker. The bone cut may displace. This may need to be repaired with a second operation. Displacement can cause joint malalignment. Immobility after surgery can cause a blood clot in the legs and lungs. This could result in death.   Foot pain is complex. Most feet hurt for more than one reason. Operating on the arthritic   big toe joint will not necessarily create a pain free foot. Appropriate shoes, healthy body weight, avoidance of bare foot walking and moderation of activity will always be   necessary to enjoy foot comfort. Arthritis is incurable even with surgery.     Surgery for this type of arthritis is nevertheless quite successful. Most surgical patients are pleased with their foot following surgery. Many of the issues described above can be controlled by taking proper care of your foot during the healing process.   Cosmetic bump surgery is discouraged for the reasons listed above. A bump and joint that is comfortable when wearing appropriate shoes should simply be treated with appropriate shoes.   Your surgeon would be happy to fully describe any of the above issues. You should pursue a full understanding of the operation, recovery process and any potential problems that could develop.

## 2025-03-31 ENCOUNTER — TELEPHONE (OUTPATIENT)
Dept: FAMILY MEDICINE | Facility: CLINIC | Age: 60
End: 2025-03-31

## 2025-03-31 NOTE — TELEPHONE ENCOUNTER
Reason for Call:  Appointment Request    Patient requesting this type of appt:  Hospital/ED Follow-Up     Requested provider: Jaime Keane    Reason patient unable to be scheduled: Not within requested timeframe    When does patient want to be seen/preferred time:  04/04/25 Afternoon     Comments: N/A    Could we send this information to you in [employer] or would you prefer to receive a phone call?:   Patient would prefer a phone call   Okay to leave a detailed message?: Yes at Cell number on file:    Telephone Information:   Mobile [phone]       Call taken on 3/31/2025 at 10:23 AM by Pao Porter

## 2025-03-31 NOTE — TELEPHONE ENCOUNTER
Future Appointments 3/31/2025 - 9/27/2025        Date Visit Type Length Department Provider     4/9/2025  3:00 PM ED/HOSP FOLLOW UP 30 min [facility] Jaime Kaene MD    Location Instructions:     [facility] is located at [address] in [place], at the intersection of [address]. This is one block south of the [facility]. Free parking is available in the lot directly north of the clinic across [address]. The clinic is near stops along bus [address].              6/3/2025 10:30 AM PREVENTATIVE ADULT 30 min [facility] Jaime Keane MD    Location Instructions:     [facility] is located at [address] in [place], at the intersection of [facility]. This is one block south of the [facility]. Free parking is available in the lot directly north of the clinic across [facility]. The clinic is near stops along bus [address].

## 2025-07-19 DIAGNOSIS — I26.92 ACUTE SADDLE PULMONARY EMBOLISM, UNSPECIFIED WHETHER ACUTE COR PULMONALE PRESENT (H): ICD-10-CM

## 2025-07-21 RX ORDER — APIXABAN 5 MG/1
5 TABLET, FILM COATED ORAL 2 TIMES DAILY
Qty: 180 TABLET | Refills: 0 | Status: SHIPPED | OUTPATIENT
Start: 2025-07-21

## 2025-07-21 NOTE — TELEPHONE ENCOUNTER
Sridevi Centeno is followed at [facility] for their history of VTE.     They were last evaluated by our team on 6/7/24 with the plan to remain on long term anticoagulation and return to clinic in 1 year.    Sridevi was scheduled to see Emery Schwartz PA-C in early June, but did not attend appointment. Prescription updated and refills given for 90 day suyapa refill. Pt needs to be scheduled/seen by Emery for future refills to be given.    This message will be routed to  for scheduling.     Arely Altamirano RN, BSN, PCCN  Nurse Clinician    [facility]  [address] [place] [address], [address], [place] [address]   Office, direct: [phone]  Main office number: [phone]  Pronouns: She, her, hers